# Patient Record
Sex: FEMALE | Race: WHITE | NOT HISPANIC OR LATINO | Employment: OTHER | URBAN - METROPOLITAN AREA
[De-identification: names, ages, dates, MRNs, and addresses within clinical notes are randomized per-mention and may not be internally consistent; named-entity substitution may affect disease eponyms.]

---

## 2017-01-02 DIAGNOSIS — I50.32 CHRONIC DIASTOLIC CONGESTIVE HEART FAILURE (H): Primary | ICD-10-CM

## 2017-01-02 NOTE — TELEPHONE ENCOUNTER
lasix      Last Written Prescription Date: 10/05/16  Last Fill Quantity: 90, # refills: 0  Last Office Visit with [facility], [facility] or [facility] prescribing provider: 10/05/16       POTASSIUM   Date Value Ref Range Status   10/05/2016 4.3 3.4 - 5.3 mmol/L Final     CREATININE   Date Value Ref Range Status   10/05/2016 1.32* 0.52 - 1.04 mg/dL Final     BP Readings from Last 3 Encounters:   10/05/16 120/60   04/18/16 121/57   02/24/16 108/60

## 2017-01-04 RX ORDER — FUROSEMIDE 20 MG
TABLET ORAL
Qty: 90 TABLET | Refills: 1 | Status: SHIPPED | OUTPATIENT
Start: 2017-01-04 | End: 2017-06-29

## 2017-01-04 NOTE — TELEPHONE ENCOUNTER
Routing refill request to provider for review/approval because:  Labs out of range:  Forrest Brumfield, RN, BSN

## 2017-03-13 ENCOUNTER — TELEPHONE (OUTPATIENT)
Dept: FAMILY MEDICINE | Facility: OTHER | Age: 77
End: 2017-03-13

## 2017-03-13 NOTE — PROGRESS NOTES
"  SUBJECTIVE:                                                    Pebbles Powell is a 76 year old female who presents to clinic today for the following health issues:    HPI    Dizziness     Onset: \"quite a while, 1 month or so\"    Description:   Do you feel faint:  YES  Does it feel like the surroundings (bed, room) are moving: no   Unsteady/off balance: no   Have you passed out or fallen: no     Progression of Symptoms:  Only happens occassionally     Accompanying Signs & Symptoms:  Heart palpitations: no   Nausea, vomiting: no   Weakness in arms or legs: no   Fatigue: YES  Vision or speech changes: no   Ringing in ears (Tinnitus): no   Hearing Loss: no    History:   Head trauma/concussion hx: no   Previous similar symptoms: unsure  Recent bleeding history: no     Precipitating factors:   Worse with activity or head movement: no   Any new medications (BP?): YES- iron and magnesium  Alcohol/drug abuse/withdrawal: no     Alleviating factors:   Does staying in a fixed position give relief:  YES       Therapies Tried and outcome: none    RESP:  She notes that she can't take a deep breath anymore, and that her lungs \"cant expand as much as they used to\".    Patient thinks it may be related to anemia. Please see triage note below from 03/13/17.:  Description: Dizziness with shortness of breath for about 1 month. Dizziness is once in a while, when standing up quickly or doing other tasks around the house. Gets shaky occasionally and has fallen due to shaking about a year ago. Known congestive heart failure and is anemic, taking iron pills 3 times a week. Intermittent shortness of breath at rest and with activity, feels like she is unable to get a deep breath in. Difficulty taking a deep breath on occasion. 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 Lungs sound clear with auscultation. Both ears have a mild to moderate amount of ear wax present. Denies weakness, fever, SOB interfering with " activities, wheezing, chest pain, worsening SOB, cough. Taking her bra off, improves breathing. Patient is requesting a chest x-ray at upcoming appointment for reassurance.   Onset/duration: 1 month  Pain scale (0-10) 0/10  LMP/preg/breast feeding: No LMP recorded. Patient has had a hysterectomy.  Last exam/Treatment: 10/05/2016  Allergies:        Allergies   Allergen Reactions     No Known Drug Allergies                Problem list and histories reviewed & adjusted, as indicated.  Additional history: as documented    Patient Active Problem List   Diagnosis     CHF (Congestive Heart Failure), diastolic dysfunction     CKD (chronic kidney disease) stage 3, GFR 30-59 ml/min     CARDIOVASCULAR SCREENING; LDL GOAL LESS THAN 130     Pernicious anemia     Iron deficiency anemia     BCC (basal cell carcinoma), right lower leg     Vulvar itching     Mammogram declined     Moderate major depression (H)     Advance care planning     Total knee replacement status     Hypothyroidism     Past Surgical History   Procedure Laterality Date     Hysterectomy, terence       fibroids     Arthroscopy knee  11/23/2010     ARTHROSCOPY KNEE performed by BROOKLYNN PULIDO at  OR     Colonoscopy  6/1/2011     Procedure:COMBINED COLONOSCOPY, SINGLE BIOPSY/POLYPECTOMY BY BIOPSY; Surgeon:JEANNETTE WYNNE; Location: GI     Esophagoscopy, gastroscopy, duodenoscopy (egd), combined  6/1/2011     Procedure:COMBINED ESOPHAGOSCOPY, GASTROSCOPY, DUODENOSCOPY (EGD), BIOPSY SINGLE OR MULTIPLE; MULTIPLE; Surgeon:JEANNETTE WYNNE; Location: GI     Colporrhaphy anterior       Appendectomy       Ent surgery       T&A     Dilation and curettage       C anter vesicourethropexy,simple       Hemorrhoidectomy       Laparotomy, tumor debulking, combined  2/9/2012     Procedure:COMBINED LAPAROTOMY, TUMOR DEBULKING; Exploratory Laparotomy,  Bilateral Salpingo Oophorectomy; Surgeon:MIKIE LU; Location:[facility] OR     Excise lesion lower extremity   "7/18/2014     Procedure: EXCISE LESION LOWER EXTREMITY;  Surgeon: Santos Roberson MD;  Location: [facility] OR     Arthroplasty knee Right 7/13/2015     Procedure: ARTHROPLASTY KNEE;  Surgeon: Santino Storm MD;  Location: [facility] OR       Social History   Substance Use Topics     Smoking status: Former Smoker     Packs/day: 1.50     Years: 50.00     Types: Cigarettes     Quit date: 8/20/2007     Smokeless tobacco: Never Used     Alcohol use No      Comment: Alcoholic, treatment in 1980 and 1988     Family History   Problem Relation Age of Onset     Alcohol/Drug Mother      Obesity Mother      Alzheimer Disease Mother      CANCER Father      Pancreatic     Cardiovascular Father      heart disease     Alcohol/Drug Father            ROS:  Constitutional, HEENT, cardiovascular, pulmonary, GI, , musculoskeletal, neuro, skin, endocrine and psych systems are negative, except as in HPI or otherwise noted     This document serves as a record of the services and decisions personally performed and made by Janiya Fraser MD. It was created on her behalf by Ilia Cronin , a trained medical scribe. The creation of this document is based the provider's statements to the medical scribe.  Ilia Cronin, March 15, 2017 1:11 PM     OBJECTIVE:                                                    Pulse 76  Temp 97.5  F (36.4  C) (Temporal)  Resp 16  Ht 5' 9.75\" (1.772 m)  Wt 187 lb (84.8 kg)  SpO2 98%  BMI 27.02 kg/m2  Body mass index is 27.02 kg/(m^2).     Orthostatic Vitals   BP Pulse Position Site Cuff Size Time Date   110/68 83 standing right regular 01:43 PM 3/15/2017   127/71 71 sitting right regular 01:43 PM 3/15/2017   113/66 67 laying right regular 01:43 PM 3/15/2017     No repeat blood pressure data filed.  No peak flow data filed.  No pain information filed.    GENERAL: healthy, alert, well nourished, well hydrated, no distress  HENT: dry mucous membranes; Sinuses: facial and maxillary not tender to palpation. ear canals- " normal; TMs- normal; Nose:- Congestion bilaterally; Mouth-post nasal drip, no ulcers, no lesions  NECK: no tenderness, moderate anterior cervical adenopathy, no asymmetry, no masses, no stiffness; thyroid- normal to palpation  RESP: lungs clear to auscultation - no rales, no rhonchi, no wheezes  CV: regular rates and rhythm, normal S1 S2, no S3 or S4 and no murmur, no click or rub, no lower extremity edema. -  NEURO: strength and tone- normal, sensory exam- grossly normal, mentation- intact, speech- normal, reflexes- symmetric  PSYCH: Negative.  [place]: negative bilaterally    Results for orders placed or performed in visit on 03/15/17 (from the past 24 hour(s))   CBC with platelets   Result Value Ref Range    WBC 7.5 4.0 - 11.0 10e9/L    RBC Count 3.47 (L) 3.8 - 5.2 10e12/L    Hemoglobin 10.4 (L) 11.7 - 15.7 g/dL    Hematocrit 31.8 (L) 35.0 - 47.0 %    MCV 92 78 - 100 fl    MCH 30.0 26.5 - 33.0 pg    MCHC 32.7 31.5 - 36.5 g/dL    RDW 13.0 10.0 - 15.0 %    Platelet Count 284 150 - 450 10e9/L   Spirometry, Breathing Capacity: Normal Order, Clinic Performed   Result Value Ref Range    FEV-1      FVC      FEV1/FVC      FEF 25/75          ASSESSMENT/PLAN:                                                        ICD-10-CM    1. Dizziness R42 BASIC METABOLIC PANEL   2. SOB (shortness of breath) R06.02 CBC with platelets     XR Chest 2 Views     Spirometry, Breathing Capacity: Normal Order, Clinic Performed   3. CKD (chronic kidney disease) stage 3, GFR 30-59 ml/min N18.3    4. Mild dehydration E86.0      Patient had normal spirometry, CXR, exam and labs.  Reassured taht her feeling of not breathing as deep is just a feeling.  She admits to being estranged recently from her daughter which is her only close relative and did not want to think that it affected her so, but feels that this is likely her symptoms and is reassured.  Wants no counseling at this time.  As for dizziness, found to be orthostatic and mildly  dehydrated -- recommended increased hydration.  She expresses understanding.         Patient Instructions   Dizziness: Make sure you are well hydrated to keep your blood pressure normal. Fatigue can also be a cause for your dizziness and shortness of breath. I recommenced rest and avoiding activities that may cause fatigue.  -Your lymph nodes in your neck are swollen most likely due to a viral infection. Follow up with the clinic if they worsen or don't resolve in a month.      The information in this document, created by the medical scribe for me, accurately reflects the services I personally performed and the decisions made by me. I have reviewed and approved this document for accuracy.   MD Janiya Moore MD, MD  [facility]

## 2017-03-13 NOTE — TELEPHONE ENCOUNTER
Pebbles Powell is a 76 year old female who presents with intermittent dizziness and shortness of breath.    NURSING ASSESSMENT:  Description:  Dizziness with shortness of breath for about 1 month. Dizziness is once in a while, when standing up quickly or doing other tasks around the house. Gets shaky occasionally and has fallen due to shaking about a year ago. Known congestive heart failure and is anemic, taking iron pills 3 times a week. Intermittent shortness of breath at rest and with activity, feels like she is unable to get a deep breath in. Difficulty taking a deep breath on occasion. 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 Lungs sound clear with auscultation. Both ears have a mild to moderate amount of ear wax present. Denies weakness, fever, SOB interfering with activities, wheezing, chest pain, worsening SOB, cough. Taking her bra off, improves breathing. Patient is requesting a chest x-ray at upcoming appointment for reassurance.   Onset/duration:  1 month  Pain scale (0-10)   0/10  LMP/preg/breast feeding:  No LMP recorded. Patient has had a hysterectomy.  Last exam/Treatment:  10/05/2016  Allergies:   Allergies   Allergen Reactions     No Known Drug Allergies      NURSING PLAN: Nursing advice to patient to keep scheduled appt    RECOMMENDED DISPOSITION:  Home care advice - to keep scheduled appointment and to monitor   Will comply with recommendation: Yes  If further questions/concerns or if symptoms do not improve, worsen or new symptoms develop, call your PCP or [facility] as soon as possible.    NOTES:  Disposition was determined by the first positive assessment question, therefore all previous assessment questions were negative    Guideline used:  Telephone Triage Protocols for Nurses, Fourth Edition, Rosa Carias  Difficult breathing  Dizziness  Nursing Judgment    Daniella Shaver RN

## 2017-03-15 ENCOUNTER — OFFICE VISIT (OUTPATIENT)
Dept: FAMILY MEDICINE | Facility: OTHER | Age: 77
End: 2017-03-15
Payer: COMMERCIAL

## 2017-03-15 ENCOUNTER — RADIANT APPOINTMENT (OUTPATIENT)
Dept: GENERAL RADIOLOGY | Facility: OTHER | Age: 77
End: 2017-03-15
Attending: FAMILY MEDICINE
Payer: COMMERCIAL

## 2017-03-15 VITALS
TEMPERATURE: 97.5 F | OXYGEN SATURATION: 98 % | HEART RATE: 76 BPM | BODY MASS INDEX: 26.77 KG/M2 | RESPIRATION RATE: 16 BRPM | HEIGHT: 70 IN | WEIGHT: 187 LBS

## 2017-03-15 DIAGNOSIS — R06.02 SOB (SHORTNESS OF BREATH): ICD-10-CM

## 2017-03-15 DIAGNOSIS — N18.30 CKD (CHRONIC KIDNEY DISEASE) STAGE 3, GFR 30-59 ML/MIN (H): ICD-10-CM

## 2017-03-15 DIAGNOSIS — R42 DIZZINESS: Primary | ICD-10-CM

## 2017-03-15 DIAGNOSIS — E86.0 MILD DEHYDRATION: ICD-10-CM

## 2017-03-15 LAB
ERYTHROCYTE [DISTWIDTH] IN BLOOD BY AUTOMATED COUNT: 13 % (ref 10–15)
FEF 25/75: NORMAL
FEV-1: NORMAL
FEV1/FVC: NORMAL
FVC: NORMAL
HCT VFR BLD AUTO: 31.8 % (ref 35–47)
HGB BLD-MCNC: 10.4 G/DL (ref 11.7–15.7)
MCH RBC QN AUTO: 30 PG (ref 26.5–33)
MCHC RBC AUTO-ENTMCNC: 32.7 G/DL (ref 31.5–36.5)
MCV RBC AUTO: 92 FL (ref 78–100)
PLATELET # BLD AUTO: 284 10E9/L (ref 150–450)
RBC # BLD AUTO: 3.47 10E12/L (ref 3.8–5.2)
WBC # BLD AUTO: 7.5 10E9/L (ref 4–11)

## 2017-03-15 PROCEDURE — 94010 BREATHING CAPACITY TEST: CPT | Performed by: FAMILY MEDICINE

## 2017-03-15 PROCEDURE — 80048 BASIC METABOLIC PNL TOTAL CA: CPT | Performed by: FAMILY MEDICINE

## 2017-03-15 PROCEDURE — 36415 COLL VENOUS BLD VENIPUNCTURE: CPT | Performed by: FAMILY MEDICINE

## 2017-03-15 PROCEDURE — 71020 XR CHEST 2 VW: CPT

## 2017-03-15 PROCEDURE — 99214 OFFICE O/P EST MOD 30 MIN: CPT | Mod: 25 | Performed by: FAMILY MEDICINE

## 2017-03-15 PROCEDURE — 85027 COMPLETE CBC AUTOMATED: CPT | Performed by: FAMILY MEDICINE

## 2017-03-15 ASSESSMENT — PAIN SCALES - GENERAL: PAINLEVEL: NO PAIN (0)

## 2017-03-15 NOTE — NURSING NOTE
"Chief Complaint   Patient presents with     Anemia     Dizziness     Shortness of Breath     Panel Management     Fall Risk, DAP, PHQ9, BMP, Microalbumin       Initial Pulse 76  Temp 97.5  F (36.4  C) (Temporal)  Resp 16  Ht 5' 9.75\" (1.772 m)  Wt 187 lb (84.8 kg)  SpO2 98%  BMI 27.02 kg/m2 Estimated body mass index is 27.02 kg/(m^2) as calculated from the following:    Height as of this encounter: 5' 9.75\" (1.772 m).    Weight as of this encounter: 187 lb (84.8 kg).  Medication Reconciliation: complete     Codie Larios, AKHIL      "

## 2017-03-15 NOTE — PATIENT INSTRUCTIONS
Dizziness: Make sure you are well hydrated to keep your blood pressure normal. Fatigue can also be a cause for your dizziness and shortness of breath. I recommenced rest and avoiding activities that may cause fatigue.  -Your lymph nodes in your neck are swollen most likely due to a viral infection. Follow up with the clinic if they worsen or don't resolve in a month.

## 2017-03-15 NOTE — LETTER
My Depression Action Plan  Name: Pebbles Powell   Date of Birth 1940  Date: 3/13/2017    My doctor: Janiya Fraser   My clinic: [address]  [facility] [address] [phone]          GREEN    ZONE   Good Control    What it looks like:     Things are going generally well. You have normal up s and down s. You may even feel depressed from time to time, but bad moods usually last less than a day.   What you need to do:  1. Continue to care for yourself (see self care plan)  2. Check your depression survival kit and update it as needed  3. Follow your physician s recommendations including any medication.  4. Do not stop taking medication unless you consult with your physician first.           YELLOW         ZONE Getting Worse    What it looks like:     Depression is starting to interfere with your life.     It may be hard to get out of bed; you may be starting to isolate yourself from others.    Symptoms of depression are starting to last most all day and this has happened for several days.     You may have suicidal thoughts but they are not constant.   What you need to do:     1. Call your care team, your response to treatment will improve if you keep your care team informed of your progress. Yellow periods are signs an adjustment may need to be made.     2. Continue your self-care, even if you have to fake it!    3. Talk to someone in your support network    4. Open up your depression survival kit           RED    ZONE Medical Alert - Get Help    What it looks like:     Depression is seriously interfering with your life.     You may experience these or other symptoms: You can t get out of bed most days, can t work or engage in other necessary activities, you have trouble taking care of basic hygiene, or basic responsibilities, thoughts of suicide or death that will not go away, self-injurious behavior.     What you need to do:  1. Call your care team  and request a same-day appointment. If they are not available (weekends or after hours) call your local crisis line, emergency room or 911.      Electronically signed by: Harriet Slaughter, March 13, 2017    Depression Self Care Plan / Survival Kit    Self-Care for Depression  Here s the deal. Your body and mind are really not as separate as most people think.  What you do and think affects how you feel and how you feel influences what you do and think. This means if you do things that people who feel good do, it will help you feel better.  Sometimes this is all it takes.  There is also a place for medication and therapy depending on how severe your depression is, so be sure to consult with your medical provider and/ or Behavioral Health Consultant if your symptoms are worsening or not improving.     In order to better manage my stress, I will:    Exercise  Get some form of exercise, every day. This will help reduce pain and release endorphins, the  feel good  chemicals in your brain. This is almost as good as taking antidepressants!  This is not the same as joining a gym and then never going! (they count on that by the way ) It can be as simple as just going for a walk or doing some gardening, anything that will get you moving.      Hygiene   Maintain good hygiene (Get out of bed in the morning, Make your bed, Brush your teeth, Take a shower, and Get dressed like you were going to work, even if you are unemployed).  If your clothes don't fit try to get ones that do.    Diet  I will strive to eat foods that are good for me, drink plenty of water, and avoid excessive sugar, caffeine, alcohol, and other mood-altering substances.  Some foods that are helpful in depression are: complex carbohydrates, B vitamins, flaxseed, fish or fish oil, fresh fruits and vegetables.    Psychotherapy  I agree to participate in Individual Therapy (if recommended).    Medication  If prescribed medications, I agree to take them.  Missing  doses can result in serious side effects.  I understand that drinking alcohol, or other illicit drug use, may cause potential side effects.  I will not stop my medication abruptly without first discussing it with my provider.    Staying Connected With Others  I will stay in touch with my friends, family members, and my primary care provider/team.    Use your imagination  Be creative.  We all have a creative side; it doesn t matter if it s oil painting, sand castles, or mud pies! This will also kick up the endorphins.    Witness Beauty  (AKA stop and smell the roses) Take a look outside, even in mid-winter. Notice colors, textures. Watch the squirrels and birds.     Service to others  Be of service to others.  There is always someone else in need.  By helping others we can  get out of ourselves  and remember the really important things.  This also provides opportunities for practicing all the other parts of the program.    Humor  Laugh and be silly!  Adjust your TV habits for less news and crime-drama and more comedy.    Control your stress  Try breathing deep, massage therapy, biofeedback, and meditation. Find time to relax each day.     My support system    Clinic Contact:  Phone number:    Contact 1:  Phone number:    Contact 2:  Phone number:    Buddhism/:  Phone number:    Therapist:  Phone number:    Local crisis center:    Phone number:    Other community support:  Phone number:

## 2017-03-15 NOTE — MR AVS SNAPSHOT
After Visit Summary   3/15/2017    Pebbles Powell    MRN: [id number]           Patient Information     Date Of Birth          1940        Visit Information        Provider Department      3/15/2017 1:15 PM Janiya Fraser MD [facility]        Today's Diagnoses     Dizziness    -  1    SOB (shortness of breath)        CKD (chronic kidney disease) stage 3, GFR 30-59 ml/min        Mild dehydration          Care Instructions    Dizziness: Make sure you are well hydrated to keep your blood pressure normal. Fatigue can also be a cause for your dizziness and shortness of breath. I recommenced rest and avoiding activities that may cause fatigue.  -Your lymph nodes in your neck are swollen most likely due to a viral infection. Follow up with the clinic if they worsen or don't resolve in a month.        Follow-ups after your visit        Who to contact     If you have questions or need follow up information about today's clinic visit or your schedule please contact [facility] directly at [phone].  Normal or non-critical lab and imaging results will be communicated to you by [employer], letter or phone within 4 business days after the clinic has received the results. If you do not hear from us within 7 days, please contact the clinic through [employer] or phone. If you have a critical or abnormal lab result, we will notify you by phone as soon as possible.  Submit refill requests through [employer] or call your pharmacy and they will forward the refill request to us. Please allow 3 business days for your refill to be completed.          Additional Information About Your Visit        MyChart Information     [employer] gives you secure access to your electronic health record. If you see a primary care provider, you can also send messages to your care team and make appointments. If you have questions, please call your primary care clinic.  If you do not have a primary care provider,  "please call [phone] and they will assist you.        Care EveryWhere ID     This is your Care EveryWhere ID. This could be used by other organizations to access your [place] medical records  [id number]        Your Vitals Were     Pulse Temperature Respirations Height Pulse Oximetry BMI (Body Mass Index)    76 97.5  F (36.4  C) (Temporal) 16 5' 9.75\" (1.772 m) 98% 27.02 kg/m2       Blood Pressure from Last 3 Encounters:   10/05/16 120/60   04/18/16 121/57   02/24/16 108/60    Weight from Last 3 Encounters:   03/15/17 187 lb (84.8 kg)   10/05/16 187 lb (84.8 kg)   04/18/16 193 lb (87.5 kg)              We Performed the Following     BASIC METABOLIC PANEL     CBC with platelets     DEPRESSION ACTION PLAN (DAP) Order [[id number]]     Spirometry, Breathing Capacity: Normal Order, Clinic Performed        Primary Care Provider Office Phone # Fax #    Janiya Yady Fraser [phone]       [facility]  [address] [facility] [address]        Thank you!     Thank you for choosing [facility]  for your care. Our goal is always to provide you with excellent care. Hearing back from our patients is one way we can continue to improve our services. Please take a few minutes to complete the written survey that you may receive in the mail after your visit with us. Thank you!             Your Updated Medication List - Protect others around you: Learn how to safely use, store and throw away your medicines at www.disposemymeds.org.          This list is accurate as of: 3/15/17  4:27 PM.  Always use your most recent med list.                   Brand Name Dispense Instructions for use    acetaminophen 650 MG 8 hour tablet     100 tablet    Take 650 mg by mouth every 6 hours       FERROUS GLUCONATE PO      Take 324 mg by mouth every other day       furosemide 20 MG tablet    LASIX    90 tablet    TAKE ONE TABLET BY MOUTH ONCE DAILY IN THE MORNING       hydrocortisone 1 % ointment     30 g "    Apply sparingly to affected area daily as needed.  Small pea size amount.       levothyroxine 100 MCG tablet    SYNTHROID/LEVOTHROID    90 tablet    Take 1 tablet (100 mcg) by mouth daily       multivitamin per tablet      1 TABLET DAILY brand varies per pt       * order for DME     1 Device    Equipment being ordered: compression stockings, med strength       * order for DME     1 Device    Equipment being ordered: high rise chair for toilet seat       VITAMIN B 12 PO      Take 2,500 mcg by mouth       * Notice:  This list has 2 medication(s) that are the same as other medications prescribed for you. Read the directions carefully, and ask your doctor or other care provider to review them with you.

## 2017-03-16 LAB
ANION GAP SERPL CALCULATED.3IONS-SCNC: 8 MMOL/L (ref 3–14)
BUN SERPL-MCNC: 27 MG/DL (ref 7–30)
CALCIUM SERPL-MCNC: 8.9 MG/DL (ref 8.5–10.1)
CHLORIDE SERPL-SCNC: 107 MMOL/L (ref 94–109)
CO2 SERPL-SCNC: 27 MMOL/L (ref 20–32)
CREAT SERPL-MCNC: 1.5 MG/DL (ref 0.52–1.04)
GFR SERPL CREATININE-BSD FRML MDRD: 34 ML/MIN/1.7M2
GLUCOSE SERPL-MCNC: 88 MG/DL (ref 70–99)
POTASSIUM SERPL-SCNC: 4.5 MMOL/L (ref 3.4–5.3)
SODIUM SERPL-SCNC: 142 MMOL/L (ref 133–144)

## 2017-03-16 NOTE — PROGRESS NOTES
Pebbles, your kidney results were fairly stable.  Please let me know if you have any questions.    Janiya Fraser MD

## 2017-05-18 ENCOUNTER — TELEPHONE (OUTPATIENT)
Dept: FAMILY MEDICINE | Facility: OTHER | Age: 77
End: 2017-05-18

## 2017-05-18 DIAGNOSIS — E78.5 HYPERLIPEMIA: Primary | ICD-10-CM

## 2017-05-18 DIAGNOSIS — E03.9 HYPOTHYROIDISM: ICD-10-CM

## 2017-05-18 NOTE — TELEPHONE ENCOUNTER
Levothyroxine 100 mcg     Last Written Prescription Date: 5/24/2016  Last Quantity: 90, # refills: 3  Last Office Visit with G, P or [facility] prescribing provider: 3/15/2017          TSH   Date Value Ref Range Status   05/06/2016 1.58 0.40 - 4.00 mU/L Final

## 2017-05-19 RX ORDER — LEVOTHYROXINE SODIUM 100 UG/1
TABLET ORAL
Qty: 90 TABLET | Refills: 0 | Status: SHIPPED | OUTPATIENT
Start: 2017-05-19 | End: 2017-08-15

## 2017-05-19 NOTE — TELEPHONE ENCOUNTER
Medication is being filled for 1 time refill only due to:  Patient needs labs TSH. Please call and help schedule.    Per HM due for fasting lipids and PHQ9  Please update also  Aleksandr Estevez, RN, BSN

## 2017-05-19 NOTE — TELEPHONE ENCOUNTER
Rx was sent 05/1/2017 for 90 tabs and 0 refills.     Please assist in scheduling Lab appt for TSH.    Daniella Shaver RN, BSN

## 2017-05-22 DIAGNOSIS — E78.5 HYPERLIPEMIA: ICD-10-CM

## 2017-05-22 DIAGNOSIS — E03.9 HYPOTHYROIDISM: ICD-10-CM

## 2017-05-22 LAB
CHOLEST SERPL-MCNC: 136 MG/DL
HDLC SERPL-MCNC: 37 MG/DL
LDLC SERPL CALC-MCNC: 84 MG/DL
NONHDLC SERPL-MCNC: 99 MG/DL
TRIGL SERPL-MCNC: 73 MG/DL
TSH SERPL DL<=0.005 MIU/L-ACNC: 1.7 MU/L (ref 0.4–4)

## 2017-05-22 PROCEDURE — 80061 LIPID PANEL: CPT | Performed by: FAMILY MEDICINE

## 2017-05-22 PROCEDURE — 36415 COLL VENOUS BLD VENIPUNCTURE: CPT | Performed by: FAMILY MEDICINE

## 2017-05-22 PROCEDURE — 84443 ASSAY THYROID STIM HORMONE: CPT | Performed by: FAMILY MEDICINE

## 2017-05-22 NOTE — PROGRESS NOTES
Pebbles, your results were all normal.    Please let me know if you have any questions.    Janiya Fraser MD

## 2017-06-29 DIAGNOSIS — I50.32 CHRONIC DIASTOLIC CONGESTIVE HEART FAILURE (H): ICD-10-CM

## 2017-06-29 NOTE — TELEPHONE ENCOUNTER
furosemide (LASIX) 20 MG tablet      Last Written Prescription Date: 1/4/17  Last Fill Quantity: 90, # refills: 1  Last Office Visit with G, P or [facility] prescribing provider: 3/15/17       Potassium   Date Value Ref Range Status   03/15/2017 4.5 3.4 - 5.3 mmol/L Final     Creatinine   Date Value Ref Range Status   03/15/2017 1.50 (H) 0.52 - 1.04 mg/dL Final     BP Readings from Last 3 Encounters:   10/05/16 120/60   04/18/16 121/57   02/24/16 108/60

## 2017-07-03 RX ORDER — FUROSEMIDE 20 MG
TABLET ORAL
Qty: 90 TABLET | Refills: 1 | Status: SHIPPED | OUTPATIENT
Start: 2017-07-03 | End: 2018-01-03

## 2017-07-03 NOTE — TELEPHONE ENCOUNTER
furosemide (LASIX) 20 MG tablet  Routing refill request to provider for review/approval because:  Labs out of range:  Creatinine     Daniella Shaver RN, BSN

## 2017-08-08 ENCOUNTER — OFFICE VISIT (OUTPATIENT)
Dept: FAMILY MEDICINE | Facility: OTHER | Age: 77
End: 2017-08-08
Payer: COMMERCIAL

## 2017-08-08 VITALS
DIASTOLIC BLOOD PRESSURE: 62 MMHG | BODY MASS INDEX: 25.38 KG/M2 | HEART RATE: 76 BPM | RESPIRATION RATE: 16 BRPM | SYSTOLIC BLOOD PRESSURE: 116 MMHG | OXYGEN SATURATION: 94 % | WEIGHT: 175.6 LBS | TEMPERATURE: 98.6 F

## 2017-08-08 DIAGNOSIS — S91.351D: Primary | ICD-10-CM

## 2017-08-08 DIAGNOSIS — L02.619 CELLULITIS AND ABSCESS OF FOOT EXCLUDING TOE: ICD-10-CM
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
## 2017-08-08 DIAGNOSIS — L03.119 CELLULITIS AND ABSCESS OF FOOT EXCLUDING TOE: ICD-10-CM

## 2017-08-08 LAB
GRAM STN SPEC: NORMAL
Lab: NORMAL
MICRO REPORT STATUS: NORMAL
SPECIMEN SOURCE: NORMAL

## 2017-08-08 PROCEDURE — 87077 CULTURE AEROBIC IDENTIFY: CPT | Performed by: PHYSICIAN ASSISTANT

## 2017-08-08 PROCEDURE — 87205 SMEAR GRAM STAIN: CPT | Performed by: PHYSICIAN ASSISTANT

## 2017-08-08 PROCEDURE — 87181 SC STD AGAR DILUTION PER AGT: CPT | Performed by: PHYSICIAN ASSISTANT

## 2017-08-08 PROCEDURE — 87070 CULTURE OTHR SPECIMN AEROBIC: CPT | Performed by: PHYSICIAN ASSISTANT

## 2017-08-08 PROCEDURE — 10061 I&D ABSCESS COMP/MULTIPLE: CPT | Performed by: PHYSICIAN ASSISTANT

## 2017-08-08 RX ORDER — DOXYCYCLINE HYCLATE 100 MG
100 TABLET ORAL 2 TIMES DAILY
Qty: 20 TABLET | Refills: 0 | Status: SHIPPED | OUTPATIENT
Start: 2017-08-08 | End: 2017-12-07

## 2017-08-08 RX ORDER — FOLIC ACID 0.8 MG
250 TABLET ORAL DAILY
COMMUNITY
End: 2023-01-23

## 2017-08-08 NOTE — MR AVS SNAPSHOT
After Visit Summary   8/8/2017    Pebbles Powell    MRN: [id number]           Patient Information     Date Of Birth          1940        Visit Information        Provider Department      8/8/2017 9:15 AM Taylor Jameson PA-C [facility]        Today's Diagnoses     Cat bite of right foot, subsequent encounter    -  1      Care Instructions    - Start Doxycycline twice a day for 10 days   - Continue with Amoxicillin-Clavulanate as prescribed  - Return to clinic every 3 days for wound repacking   - Do not get area wet   - Change outer bandage once a day                        Wound Closure and Wound Care  What is wound closure?   Wounds heal more quickly and with less risk of infection and scarring when the wound is cleaned and the wound edges are held together (closed). Scrapes, scratches, puncture wounds, and shallow cuts may need only cleaning, ointment, and a bandage. Some cuts may need to be closed with tape strips called Steri-Strips or tissue adhesive liquid (skin glue). If a cut or surgical incision is deep, very long, jagged, or under a lot of tension (such as a cut over a joint), stitches (also called sutures) or staples may be needed to close the wound.   How do I take care of my wound and sutures?   If you get an accidental cut, put pressure on the wound with a gauze pad or clean cloth right away to stop the bleeding. Then gently but thoroughly wash it with soap and cool water. Soapy water can be used around, but not in the cut. Try to remove all dirt and debris but do not scrub vigorously. If you decide to get medical treatment, cover the wound and apply pressure as needed to control bleeding while traveling to your healthcare provider's office, urgent care clinic, or emergency room.   After a wound is closed by your healthcare provider, the wound and the area around it must be kept clean and dry. The care of a stapled wound is similar to the care of a  "sutured wound. There are minor differences in caring for a wound closed with skin glue.   Do not let a wound closed with stitches or staples get wet for the first 24 hours. After 24 hours, you can shower or you can clean the wound with hydrogen peroxide or gently wash it with soap and warm water twice a day.   If your wound was closed with skin glue, keep the wound dry for the first 4 hours after the skin glue was put on. After the first 4 hours, you may occasionally and briefly wet the wound in the shower. You can clean the wound with hydrogen peroxide or gently wash it with soap and water twice a day.   If your wound is closed with Steri-Strips, they may be more likely to separate if they get wet. Keep them dry for the first few days while you're in the shower or bath.   Do not soak or scrub the wound. Do not take a bath, go swimming, or use a hot tub.   If recommended by your healthcare provider, you may put a small amount of antibiotic ointment on the wound each time you clean it. This can prevent infection. It will also help keep bandages from sticking to the wound. If a rash appears, stop using the ointment. If your wound is closed with skin glue, do not put liquid, antibiotic ointment, or any other product on the wound while the adhesive is in place. It may loosen the film before the wound is healed.   Make sure the wound is kept dry between washings. After showering or bathing, gently pat the wound dry with a soft towel.   Your healthcare provider may recommend that you cover the wound with gauze or a new, bandage to keep it from getting dirty. Be sure to keep the bandage dry. Put on a new bandage after cleaning the wound of if the old one gets dirty or wet.   Your healthcare provider may recommend leaving the wound \"open to air\" and not covered by a bandage while you sleep to help speed up the healing process. If the wound was closed with skin glue, you do not need a bandage.   For the first 1 or 2 days " keep the area propped up higher than your heart. This will help lessen your pain and any swelling.   Protect the wound from repeat injury until the skin has had time to heal.   Protect the wound from a lot of exposure to sunlight or tanning lamps while skin glue is in place. Wounds exposed to the sun can become red, while scars that have not been exposed to the sun usually turn white after a period of time.   Do not scratch, rub, or pick at your stitches, staples, or skin glue. This may cause them to loosen before the wound is healed.   Avoid activities that will make you sweat a lot until the skin glue has naturally fallen off or the stitches or staples have been removed.   Any wound can become infected. When you are cleaning your wound, look for these signs of infection:   increased redness   red streaks   increased swelling   increased pain or tenderness   pus or other drainage   warmth in the area of the wound   fever.   Contact your provider if you see any signs of infection.   If your wound was accidental, be sure to ask if a tetanus booster is needed. Treatment of accidental wounds may include taking an oral antibiotic to help prevent infection. Be sure to take the medicine until it is completely gone. Do not stop taking it just because the wound looks like it is healing well.   When are stitches, staples, or other types of wound closures removed?   Steri-Strips are usually left on until they fall off. If they have not fallen off after 2 weeks, they should be removed. Skin glue usually falls off on its own in 5 to 10 days.   For deep cuts the first stitches are placed under the skin. These stitches are made of materials that dissolve and do not need to be removed. Sutures or staples on the surface of the skin need to be removed by your healthcare provider 5 to 14 days after they are put in. The length of time depends on where the cut is. Sutures in wounds on the face usually can be removed after 5 to 7 days.  In areas of high stress, such as hands, knees, or elbows, the sutures must stay in 10 to 14 days. Your provider will tell you when you should come to the office for removal of your sutures or staples. Do NOT remove sutures or staples yourself unless your provider instructs you to do so. Staples are removed using a special tool. If you don't have the tool, don't try to remove the staples.   When should I call my healthcare provider?   Some swelling, redness, and pain are common with all wounds and normally go away as the wound heals.   Call your provider right away if:   You start to have any signs or symptoms of infection. These include:   Your skin is redder or more painful.   You have red streaks from the wound going toward your heart.   The wound area is very warm to touch.   You have pus or other fluid coming from the wound area.   You have a fever higher than 101.5? F (38.6? C).   You have chills, nausea, vomiting, or muscle aches.   The wound seems to be opening up or you notice any drainage.   The wound bleeds for more than 10 minutes.   The stitches or staples are loose.   The skin glue is loosening before it is supposed to.   You have any symptoms that worry you.     Published by [employer].  This content is reviewed periodically and is subject to change as new health information becomes available. The information is intended to inform and educate and is not a replacement for medical evaluation, advice, diagnosis or treatment by a healthcare professional.   Written by Dwight Hernandez MD.   ? 2010 [employer] and/or its affiliates. All Rights Reserved.   Copyright   [employer] 2011                Follow-ups after your visit        Your next 10 appointments already scheduled     Aug 11, 2017  9:30 AM CDT   Nurse [facility] with NL RN TEAM [facility], [facility]    [address] [address]   [phone]            Aug 14, 2017 10:00  AM CDT   Nurse Only with NL RN TEAM A, DAYRON   [facility]    [address]  [facility] [address] [phone]            Aug 18, 2017 10:40 AM CDT   Office Visit with Carolyn Domingo MD   [facility]    [address] [place] [address]  [address]  [facility] [id number] [phone]           Bring a current list of meds and any records pertaining to this visit. For Physicals, please bring immunization records and any forms needing to be filled out. Please arrive 10 minutes early to complete paperwork.              Who to contact     If you have questions or need follow up information about today's clinic visit or your schedule please contact [facility] directly at [phone].  Normal or non-critical lab and imaging results will be communicated to you by MyChart, letter or phone within 4 business days after the clinic has received the results. If you do not hear from us within 7 days, please contact the clinic through [email or web address] or phone. If you have a critical or abnormal lab result, we will notify you by phone as soon as possible.  Submit refill requests through [employer] or call your pharmacy and they will forward the refill request to us. Please allow 3 business days for your refill to be completed.          Additional Information About Your Visit        MyChart Information     [employer] gives you secure access to your electronic health record. If you see a primary care provider, you can also send messages to your care team and make appointments. If you have questions, please call your primary care clinic.  If you do not have a primary care provider, please call [phone] and they will assist you.        Care EveryWhere ID     This is your Care EveryWhere ID. This could be used by other organizations to access your [facility] medical records  [id number]        Your Vitals  Were     Pulse Temperature Respirations Pulse Oximetry BMI (Body Mass Index)       76 98.6  F (37  C) (Oral) 16 94% 25.38 kg/m2        Blood Pressure from Last 3 Encounters:   08/08/17 116/62   10/05/16 120/60   04/18/16 121/57    Weight from Last 3 Encounters:   08/08/17 175 lb 9.6 oz (79.7 kg)   03/15/17 187 lb (84.8 kg)   10/05/16 187 lb (84.8 kg)              We Performed the Following     Gram stain     Wound Culture Aerobic Bacterial          Today's Medication Changes          These changes are accurate as of: 8/8/17  9:31 AM.  If you have any questions, ask your nurse or doctor.               Start taking these medicines.        Dose/Directions    doxycycline 100 MG tablet   Commonly known as:  VIBRA-TABS   Used for:  Cat bite of right foot, subsequent encounter   Started by:  Taylor Jameson PA-C        Dose:  100 mg   Take 1 tablet (100 mg) by mouth 2 times daily   Quantity:  20 tablet   Refills:  0            Where to get your medicines      These medications were sent to [facility] [address] [place] [address] [address] [address]     Phone:  [phone]     doxycycline 100 MG tablet                Primary Care Provider Office Phone # Fax #    Janiya Fraser [phone]       [facility]  [address] [facility] [address]        Equal Access to Services     MICHAEL LARES AH: Hadii aad ku hadasho [place]i, waaxda luqadaha, qaybta kaalmada adeegyada, waxay denisa herr. So [facility] [phone].    ATENCIÓN: Si habla español, tiene a caal disposición servicios gratuitos de asistencia lingüística. Festus al [phone].    We comply with applicable federal civil rights laws and [place] laws. We do not discriminate on the basis of race, color, national origin, age, disability sex, sexual orientation or gender identity.            Thank you!     Thank you for choosing [facility]  for your  care. Our goal is always to provide you with excellent care. Hearing back from our patients is one way we can continue to improve our services. Please take a few minutes to complete the written survey that you may receive in the mail after your visit with us. Thank you!             Your Updated Medication List - Protect others around you: Learn how to safely use, store and throw away your medicines at www.disposemymeds.org.          This list is accurate as of: 8/8/17  9:31 AM.  Always use your most recent med list.                   Brand Name Dispense Instructions for use Diagnosis    acetaminophen 650 MG 8 hour tablet     100 tablet    Take 650 mg by mouth every 6 hours    Status post total right knee replacement       AUGMENTIN PO           doxycycline 100 MG tablet    VIBRA-TABS    20 tablet    Take 1 tablet (100 mg) by mouth 2 times daily    Cat bite of right foot, subsequent encounter       FERROUS GLUCONATE PO      Take 324 mg by mouth every other day        furosemide 20 MG tablet    LASIX    90 tablet    TAKE ONE TABLET BY MOUTH ONCE DAILY IN THE MORNING    Chronic diastolic congestive heart failure (H)       hydrocortisone 1 % ointment     30 g    Apply sparingly to affected area daily as needed.  Small pea size amount.    Lichen sclerosus       levothyroxine 100 MCG tablet    SYNTHROID/LEVOTHROID    90 tablet    TAKE ONE TABLET BY MOUTH ONCE DAILY    Hypothyroidism       Magnesium 500 MG Caps           multivitamin per tablet      1 TABLET DAILY brand varies per pt        * order for DME     1 Device    Equipment being ordered: compression stockings, med strength    Unspecified venous (peripheral) insufficiency       * order for DME     1 Device    Equipment being ordered: high rise chair for toilet seat    Unspecified venous (peripheral) insufficiency, OA (osteoarthritis) of knee       VITAMIN B 12 PO      Take 2,500 mcg by mouth        * Notice:  This list has 2 medication(s) that are the same as other  medications prescribed for you. Read the directions carefully, and ask your doctor or other care provider to review them with you.

## 2017-08-08 NOTE — PATIENT INSTRUCTIONS
- Start Doxycycline twice a day for 10 days   - Continue with Amoxicillin-Clavulanate as prescribed  - Return to clinic every 3 days for wound repacking   - Do not get area wet   - Change outer bandage once a day                        Wound Closure and Wound Care  What is wound closure?   Wounds heal more quickly and with less risk of infection and scarring when the wound is cleaned and the wound edges are held together (closed). Scrapes, scratches, puncture wounds, and shallow cuts may need only cleaning, ointment, and a bandage. Some cuts may need to be closed with tape strips called Steri-Strips or tissue adhesive liquid (skin glue). If a cut or surgical incision is deep, very long, jagged, or under a lot of tension (such as a cut over a joint), stitches (also called sutures) or staples may be needed to close the wound.   How do I take care of my wound and sutures?   If you get an accidental cut, put pressure on the wound with a gauze pad or clean cloth right away to stop the bleeding. Then gently but thoroughly wash it with soap and cool water. Soapy water can be used around, but not in the cut. Try to remove all dirt and debris but do not scrub vigorously. If you decide to get medical treatment, cover the wound and apply pressure as needed to control bleeding while traveling to your healthcare provider's office, urgent care clinic, or emergency room.   After a wound is closed by your healthcare provider, the wound and the area around it must be kept clean and dry. The care of a stapled wound is similar to the care of a sutured wound. There are minor differences in caring for a wound closed with skin glue.   Do not let a wound closed with stitches or staples get wet for the first 24 hours. After 24 hours, you can shower or you can clean the wound with hydrogen peroxide or gently wash it with soap and warm water twice a day.   If your wound was closed with skin glue, keep the wound dry for the first 4 hours  "after the skin glue was put on. After the first 4 hours, you may occasionally and briefly wet the wound in the shower. You can clean the wound with hydrogen peroxide or gently wash it with soap and water twice a day.   If your wound is closed with Steri-Strips, they may be more likely to separate if they get wet. Keep them dry for the first few days while you're in the shower or bath.   Do not soak or scrub the wound. Do not take a bath, go swimming, or use a hot tub.   If recommended by your healthcare provider, you may put a small amount of antibiotic ointment on the wound each time you clean it. This can prevent infection. It will also help keep bandages from sticking to the wound. If a rash appears, stop using the ointment. If your wound is closed with skin glue, do not put liquid, antibiotic ointment, or any other product on the wound while the adhesive is in place. It may loosen the film before the wound is healed.   Make sure the wound is kept dry between washings. After showering or bathing, gently pat the wound dry with a soft towel.   Your healthcare provider may recommend that you cover the wound with gauze or a new, bandage to keep it from getting dirty. Be sure to keep the bandage dry. Put on a new bandage after cleaning the wound of if the old one gets dirty or wet.   Your healthcare provider may recommend leaving the wound \"open to air\" and not covered by a bandage while you sleep to help speed up the healing process. If the wound was closed with skin glue, you do not need a bandage.   For the first 1 or 2 days keep the area propped up higher than your heart. This will help lessen your pain and any swelling.   Protect the wound from repeat injury until the skin has had time to heal.   Protect the wound from a lot of exposure to sunlight or tanning lamps while skin glue is in place. Wounds exposed to the sun can become red, while scars that have not been exposed to the sun usually turn white after a " period of time.   Do not scratch, rub, or pick at your stitches, staples, or skin glue. This may cause them to loosen before the wound is healed.   Avoid activities that will make you sweat a lot until the skin glue has naturally fallen off or the stitches or staples have been removed.   Any wound can become infected. When you are cleaning your wound, look for these signs of infection:   increased redness   red streaks   increased swelling   increased pain or tenderness   pus or other drainage   warmth in the area of the wound   fever.   Contact your provider if you see any signs of infection.   If your wound was accidental, be sure to ask if a tetanus booster is needed. Treatment of accidental wounds may include taking an oral antibiotic to help prevent infection. Be sure to take the medicine until it is completely gone. Do not stop taking it just because the wound looks like it is healing well.   When are stitches, staples, or other types of wound closures removed?   Steri-Strips are usually left on until they fall off. If they have not fallen off after 2 weeks, they should be removed. Skin glue usually falls off on its own in 5 to 10 days.   For deep cuts the first stitches are placed under the skin. These stitches are made of materials that dissolve and do not need to be removed. Sutures or staples on the surface of the skin need to be removed by your healthcare provider 5 to 14 days after they are put in. The length of time depends on where the cut is. Sutures in wounds on the face usually can be removed after 5 to 7 days. In areas of high stress, such as hands, knees, or elbows, the sutures must stay in 10 to 14 days. Your provider will tell you when you should come to the office for removal of your sutures or staples. Do NOT remove sutures or staples yourself unless your provider instructs you to do so. Staples are removed using a special tool. If you don't have the tool, don't try to remove the staples.    When should I call my healthcare provider?   Some swelling, redness, and pain are common with all wounds and normally go away as the wound heals.   Call your provider right away if:   You start to have any signs or symptoms of infection. These include:   Your skin is redder or more painful.   You have red streaks from the wound going toward your heart.   The wound area is very warm to touch.   You have pus or other fluid coming from the wound area.   You have a fever higher than 101.5? F (38.6? C).   You have chills, nausea, vomiting, or muscle aches.   The wound seems to be opening up or you notice any drainage.   The wound bleeds for more than 10 minutes.   The stitches or staples are loose.   The skin glue is loosening before it is supposed to.   You have any symptoms that worry you.     Published by [employer].  This content is reviewed periodically and is subject to change as new health information becomes available. The information is intended to inform and educate and is not a replacement for medical evaluation, advice, diagnosis or treatment by a healthcare professional.   Written by Dwight Hernandez MD.   ? 2010 [employer] and/or its affiliates. All Rights Reserved.   Copyright   [employer] 2011

## 2017-08-08 NOTE — PROGRESS NOTES
SUBJECTIVE:                                                    Pebbles Powell is a 76 year old female who presents to clinic today for the following health issues:      HPI    Pt was seen at the urgent care ([facility]) Sunday for a cat bite, was sutured, but pt states it feels painful to touch, warm and is red, looks infected to her.  There are two bites on the right leg.  Pt is currently taking augmentin  - Her own cat, got from [facility] in [place], was abused, was lashing out due to having to be in carrier to go to t    - UTD on all the shots   - She pulled her leg away when it was still attached so became like a slash derik  - Was started on Augmentin BID for 10 days   - Also had 2 stitches put in top one and 1 in the bottom one     Problem list and histories reviewed & adjusted, as indicated.  Additional history: as documented    ROS:  Constitutional, HEENT, cardiovascular, pulmonary, gi and gu systems are negative, except as otherwise noted.      OBJECTIVE:   /62 (BP Location: Right arm, Patient Position: Chair, Cuff Size: Adult Regular)  Pulse 76  Temp 98.6  F (37  C) (Oral)  Resp 16  Wt 175 lb 9.6 oz (79.7 kg)  SpO2 94%  BMI 25.38 kg/m2  Body mass index is 25.38 kg/(m^2).  GENERAL APPEARANCE: healthy, alert and no distress  EYES: Eyes grossly normal to inspection, PERRLA, conjunctivae and sclerae without injection or discharge, EOM intact   MS: No musculoskeletal defects are noted and gait is age appropriate without ataxia   SKIN: Right lower leg - medial side of ankle - 2 large lacerations ~2 cm in length each, proximal one 2 stitches and distal one 1 stich, both erythematous with purulent drainage, edema and erythema extend in all directions 8 cm with distal portion with possible fluctuance palpated (see below for remainder)   No other suspicious lesions or rashes, hydration status appears adeuqate with normal skin turgor   PSYCH: Alert and oriented x3; speech- coherent ,  normal rate and volume; able to articulate logical thoughts, able to abstract reason, no tangential thoughts, no hallucinations or delusions, mentation appears normal, Mood is euthymic. Affect is appropriate for this mood state and bright. Thought content is free of suicidal ideation, hallucinations, and delusions. Dress is adequate and upkept. Eye contact is good during conversation.       Diagnostic Test Results:  Wound culture - pending       Progress Note:  Pause for the cause has been completed prior to the prceedure.   1. Patient was identified by both name and date of birth   2. The correct site was identified   3. Site was marked by provider    4. Written informed consent correct and signed or verbal authorization  to proceed was obtained   5. Verifed necessary supplies, equipment, and diagnostics are available    6. Time out was performed immediately prior to procedure    Objective: The lacerations are of the above mentioned size and location and is/are purulent, oozing, erythematous, and edematous. The area was prepped using alcohol swabs and appropriately anesthetized using 5 cc of 1% lidocaine with epi. Using the usual technique, the 3 sutures were removed, incision was re-opened as they had almost completely closed, and drainage of abscess was performed. Copious amounts of purulent discharge was expressed. Both abscesses were probed - proximal laceration has abscess that extends deep 3 mm and in all directions from edges of lacerations 3 mm and distal laceration is only 2 mm deep and extends 3 mm posteriorly and 2 mm in remaining directions from laceration edges. Hemostasis was obtained using 1/4 inch iodoform packing. An appropriate dressing was applied. The procedure was well tolerated and without complications.         ASSESSMENT/PLAN:       ICD-10-CM    1. Cat bite of right foot, subsequent encounter S91.351D doxycycline (VIBRA-TABS) 100 MG tablet    W55.01XD Wound Culture Aerobic Bacterial     Gram  stain   2. Cellulitis and abscess of foot excluding toe L03.119     L02.619      - Discussed infection and abscess formation, likely due to sutures that were placed   - As above, sutures removed and lacerations re-opened and drained  - Packed with iodoform packing and covered with large bandage   - Discussed how this will allow her lacerations to heal from inside out by keeping open   - Start Doxycycline twice a day for 10 days, discussed use and side effects, will allow for additional bacterial coverage   - Continue with Amoxicillin-Clavulanate as prescribed  - Will await culture results, specifically to see if addition of Metronidazole is warranted   - Return to clinic every 3 days for wound repacking   - Do not get area wet, no soaking   - Change outer bandage once a day, leaving packing     The patient indicates understanding of these issues and agrees with the plan.    Follow up: as above       Taylor Jameson PA-C  [facility]

## 2017-08-08 NOTE — NURSING NOTE
"Chief Complaint   Patient presents with     Infection       Initial /62 (BP Location: Right arm, Patient Position: Chair, Cuff Size: Adult Regular)  Pulse 76  Temp 98.6  F (37  C) (Oral)  Resp 16  Wt 175 lb 9.6 oz (79.7 kg)  SpO2 94%  BMI 25.38 kg/m2 Estimated body mass index is 25.38 kg/(m^2) as calculated from the following:    Height as of 3/15/17: 5' 9.75\" (1.772 m).    Weight as of this encounter: 175 lb 9.6 oz (79.7 kg).  Medication Reconciliation: complete  "

## 2017-08-11 ENCOUNTER — ALLIED HEALTH/NURSE VISIT (OUTPATIENT)
Dept: FAMILY MEDICINE | Facility: OTHER | Age: 77
End: 2017-08-11
Payer: COMMERCIAL

## 2017-08-11 DIAGNOSIS — Z51.89 ENCOUNTER FOR WOUND CARE: Primary | ICD-10-CM

## 2017-08-11 PROCEDURE — 99207 ZZC NO CHARGE NURSE ONLY: CPT

## 2017-08-11 NOTE — PROGRESS NOTES
Wound location: Right medial ankle    Approximately 10 inches of Iodoform packing was removed from one wound and 12 inches of Iodoform packing was removed from the second wound. There was scant bloody drainage on the Band-Aid. There was scant serosanguinous drainage on the packing.     Top Wound  Length 1/2 inch  Depth 1/2 inch  Wound extends downward from opening without tunneling. Approximately 7.5 inches of Iodoform packing was inserted in sterile fashion without complication. No pain was noted and patient tolerated well.    Bottom Wound  Length 3/4 inches long with 1/2 inch opening  Depth 1/2 inch  Wound extends downward and to the left of the opening without tunneling. Approximately 5 inches of Iodoform packing was inserted in sterile fashion without complication. There was some mild pain noted, but patient tolerated well.     Damian Jameson did evaluate wound today and noted improvement from previous. There is some mild swelling and warmth of the ankle, with redness of the skin extending upward from the top wound. Patient is tolerating antibiotics and is now able to walk normally instead of on her tip toes. She will follow up for her next packing on Monday, 8/14.     Kourtney Lui, RN, BSN

## 2017-08-11 NOTE — MR AVS SNAPSHOT
After Visit Summary   8/11/2017    Pebbles Powell    MRN: [id number]           Patient Information     Date Of Birth          1940        Visit Information        Provider Department      8/11/2017 9:30 AM NL RN TEAM A, DAYRON [facility]        Today's Diagnoses     Encounter for wound care    -  1       Follow-ups after your visit        Your next 10 appointments already scheduled     Aug 14, 2017 10:00 AM CDT   Nurse [facility] with NL RN TEAM A, [facility]    [address]  [place] [address]   [phone]            Aug 18, 2017 10:40 AM CDT   Office Visit with Carolyn Domingo MD   [facility] ([facility] )    [address] [place] [address] [id number] [phone]           Bring a current list of meds and any records pertaining to this visit. For Physicals, please bring immunization records and any forms needing to be filled out. Please arrive 10 minutes early to complete paperwork.              Who to contact     If you have questions or need follow up information about today's clinic visit or your schedule please contact [facility] directly at [phone].  Normal or non-critical lab and imaging results will be communicated to you by Junt[employer] Finanz[employer]art, letter or phone within 4 business days after the clinic has received the results. If you do not hear from us within 7 days, please contact the clinic through [employer] or phone. If you have a critical or abnormal lab result, we will notify you by phone as soon as possible.  Submit refill requests through EndorphMe or call your pharmacy and they will forward the refill request to us. Please allow 3 business days for your refill to be completed.          Additional Information About Your Visit        [employer] Information     End[employer]Me gives you secure access to your electronic health  record. If you see a primary care provider, you can also send messages to your care team and make appointments. If you have questions, please call your primary care clinic.  If you do not have a primary care provider, please call [phone] and they will assist you.        Care EveryWhere ID     This is your Care EveryWhere ID. This could be used by other organizations to access your [place] medical records  [id number]         Blood Pressure from Last 3 Encounters:   08/08/17 116/62   10/05/16 120/60   04/18/16 121/57    Weight from Last 3 Encounters:   08/08/17 175 lb 9.6 oz (79.7 kg)   03/15/17 187 lb (84.8 kg)   10/05/16 187 lb (84.8 kg)              Today, you had the following     No orders found for display       Primary Care Provider Office Phone # Fax #    Janiya Yady Fraser [phone]       [address] [place] [address]        Equal Access to Services     ISAÍAS [facility] : Hadii aad ku hadasho Soomaali, waaxda luqadaha, qaybta kaalmada adeegyada, waxay idiin hayjulio cn clayton claudio . So [facility] [phone].    ATENCIÓN: Si habla español, tiene a caal disposición servicios gratuitos de asistencia lingüística. [facility] [phone].    We comply with applicable federal civil rights laws and [place] laws. We do not discriminate on the basis of race, color, national origin, age, disability sex, sexual orientation or gender identity.            Thank you!     Thank you for choosing [facility]  for your care. Our goal is always to provide you with excellent care. Hearing back from our patients is one way we can continue to improve our services. Please take a few minutes to complete the written survey that you may receive in the mail after your visit with us. Thank you!             Your Updated Medication List - Protect others around you: Learn how to safely use, store and throw away your medicines at www.disposemymeds.org.          This list is accurate as of: 8/11/17  10:21 AM.  Always use your most recent med list.                   Brand Name Dispense Instructions for use Diagnosis    acetaminophen 650 MG 8 hour tablet     100 tablet    Take 650 mg by mouth every 6 hours    Status post total right knee replacement       AUGMENTIN PO           doxycycline 100 MG tablet    VIBRA-TABS    20 tablet    Take 1 tablet (100 mg) by mouth 2 times daily    Cat bite of right foot, subsequent encounter       FERROUS GLUCONATE PO      Take 324 mg by mouth every other day        furosemide 20 MG tablet    LASIX    90 tablet    TAKE ONE TABLET BY MOUTH ONCE DAILY IN THE MORNING    Chronic diastolic congestive heart failure (H)       levothyroxine 100 MCG tablet    SYNTHROID/LEVOTHROID    90 tablet    TAKE ONE TABLET BY MOUTH ONCE DAILY    Hypothyroidism       Magnesium 500 MG Caps           multivitamin per tablet      1 TABLET DAILY brand varies per pt        order for DME     1 Device    Equipment being ordered: compression stockings, med strength    Unspecified venous (peripheral) insufficiency       VITAMIN B 12 PO      Take 2,500 mcg by mouth

## 2017-08-12 LAB
BACTERIA SPEC CULT: ABNORMAL
Lab: ABNORMAL
MICRO REPORT STATUS: ABNORMAL
MICROORGANISM SPEC CULT: ABNORMAL
SPECIMEN SOURCE: ABNORMAL

## 2017-08-14 ENCOUNTER — TELEPHONE (OUTPATIENT)
Dept: LAB | Facility: OTHER | Age: 77
End: 2017-08-14

## 2017-08-14 ENCOUNTER — ALLIED HEALTH/NURSE VISIT (OUTPATIENT)
Dept: FAMILY MEDICINE | Facility: OTHER | Age: 77
End: 2017-08-14
Payer: COMMERCIAL

## 2017-08-14 DIAGNOSIS — D50.8 OTHER IRON DEFICIENCY ANEMIA: ICD-10-CM

## 2017-08-14 DIAGNOSIS — Z51.89 ENCOUNTER FOR WOUND CARE: Primary | ICD-10-CM

## 2017-08-14 DIAGNOSIS — N18.30 CKD (CHRONIC KIDNEY DISEASE) STAGE 3, GFR 30-59 ML/MIN (H): Primary | ICD-10-CM

## 2017-08-14 DIAGNOSIS — N18.30 CHRONIC KIDNEY DISEASE, STAGE III (MODERATE) (H): Primary | ICD-10-CM

## 2017-08-14 LAB
ALBUMIN SERPL-MCNC: 3.3 G/DL (ref 3.4–5)
ALP SERPL-CCNC: 70 U/L (ref 40–150)
ALT SERPL W P-5'-P-CCNC: 16 U/L (ref 0–50)
ANION GAP SERPL CALCULATED.3IONS-SCNC: 8 MMOL/L (ref 3–14)
AST SERPL W P-5'-P-CCNC: 15 U/L (ref 0–45)
BILIRUB SERPL-MCNC: 0.3 MG/DL (ref 0.2–1.3)
BUN SERPL-MCNC: 33 MG/DL (ref 7–30)
CALCIUM SERPL-MCNC: 8.6 MG/DL (ref 8.5–10.1)
CHLORIDE SERPL-SCNC: 104 MMOL/L (ref 94–109)
CO2 SERPL-SCNC: 27 MMOL/L (ref 20–32)
CREAT SERPL-MCNC: 1.55 MG/DL (ref 0.52–1.04)
GFR SERPL CREATININE-BSD FRML MDRD: 33 ML/MIN/1.7M2
GLUCOSE SERPL-MCNC: 87 MG/DL (ref 70–99)
POTASSIUM SERPL-SCNC: 4.6 MMOL/L (ref 3.4–5.3)
PROT SERPL-MCNC: 7.1 G/DL (ref 6.8–8.8)
SODIUM SERPL-SCNC: 139 MMOL/L (ref 133–144)

## 2017-08-14 PROCEDURE — 99207 ZZC NO CHARGE NURSE ONLY: CPT

## 2017-08-14 PROCEDURE — 36415 COLL VENOUS BLD VENIPUNCTURE: CPT | Performed by: PHYSICIAN ASSISTANT

## 2017-08-14 PROCEDURE — 80053 COMPREHEN METABOLIC PANEL: CPT | Performed by: PHYSICIAN ASSISTANT

## 2017-08-14 NOTE — PROGRESS NOTES
Alice Rogel    Your wound results showed growth of Pasteurella - which is a bacteria found in cats. It is susceptible to the medications I have you on, no need to change.     The results are attached for your review.       Damian Jameson PA-C

## 2017-08-14 NOTE — MR AVS SNAPSHOT
After Visit Summary   8/14/2017    Pebbles Powell    MRN: [id number]           Patient Information     Date Of Birth          1940        Visit Information        Provider Department      8/14/2017 10:00 AM NL RN TEAM A, [facility]        Today's Diagnoses     Encounter for wound care    -  1       Follow-ups after your visit        Your next 10 appointments already scheduled     Aug 16, 2017  8:30 AM CDT   Nurse Only with NL RN TEAM A, DAYRON   [facility] ([facility])    [address]  [facility] [address] [phone]            Aug 18, 2017 10:40 AM CDT   Office Visit with Carolyn Domingo MD   [facility] ([facility] )    [address] [place] [id number] [phone]           Bring a current list of meds and any records pertaining to this visit. For Physicals, please bring immunization records and any forms needing to be filled out. Please arrive 10 minutes early to complete paperwork.              Who to contact     If you have questions or need follow up information about today's clinic visit or your schedule please contact [facility] directly at [phone].  Normal or non-critical lab and imaging results will be communicated to you by Flagrhart, letter or phone within 4 business days after the clinic has received the results. If you do not hear from us within 7 days, please contact the clinic through Flagrhart or phone. If you have a critical or abnormal lab result, we will notify you by phone as soon as possible.  Submit refill requests through [employer] or call your pharmacy and they will forward the refill request to us. Please allow 3 business days for your refill to be completed.          Additional Information About Your Visit        FlagrharPeerApp Information     [employer] gives you secure access to your electronic health  record. If you see a primary care provider, you can also send messages to your care team and make appointments. If you have questions, please call your primary care clinic.  If you do not have a primary care provider, please call [phone] and they will assist you.        Care EveryWhere ID     This is your Care EveryWhere ID. This could be used by other organizations to access your [place] medical records  [id number]         Blood Pressure from Last 3 Encounters:   08/08/17 116/62   10/05/16 120/60   04/18/16 121/57    Weight from Last 3 Encounters:   08/08/17 175 lb 9.6 oz (79.7 kg)   03/15/17 187 lb (84.8 kg)   10/05/16 187 lb (84.8 kg)              Today, you had the following     No orders found for display       Primary Care Provider Office Phone # Fax #    Janiya Yady Fraser [phone]       [address] [place] [address]        Equal Access to Services     ISAÍAS [facility] : Hadii aad ku hadasho Soomaali, waaxda luqadaha, qaybta kaalmada adeegyada, waxay idiin hayjulio cn clayton claudio . So [facility] [phone].    ATENCIÓN: Si habla español, tiene a caal disposición servicios gratuitos de asistencia lingüística. [facility] [phone].    We comply with applicable federal civil rights laws and [place] laws. We do not discriminate on the basis of race, color, national origin, age, disability sex, sexual orientation or gender identity.            Thank you!     Thank you for choosing [facility]  for your care. Our goal is always to provide you with excellent care. Hearing back from our patients is one way we can continue to improve our services. Please take a few minutes to complete the written survey that you may receive in the mail after your visit with us. Thank you!             Your Updated Medication List - Protect others around you: Learn how to safely use, store and throw away your medicines at www.disposemymeds.org.          This list is accurate as of: 8/14/17  10:55 AM.  Always use your most recent med list.                   Brand Name Dispense Instructions for use Diagnosis    acetaminophen 650 MG 8 hour tablet     100 tablet    Take 650 mg by mouth every 6 hours    Status post total right knee replacement       AUGMENTIN PO           doxycycline 100 MG tablet    VIBRA-TABS    20 tablet    Take 1 tablet (100 mg) by mouth 2 times daily    Cat bite of right foot, subsequent encounter       FERROUS GLUCONATE PO      Take 324 mg by mouth every other day        furosemide 20 MG tablet    LASIX    90 tablet    TAKE ONE TABLET BY MOUTH ONCE DAILY IN THE MORNING    Chronic diastolic congestive heart failure (H)       levothyroxine 100 MCG tablet    SYNTHROID/LEVOTHROID    90 tablet    TAKE ONE TABLET BY MOUTH ONCE DAILY    Hypothyroidism       Magnesium 500 MG Caps           multivitamin per tablet      1 TABLET DAILY brand varies per pt        order for DME     1 Device    Equipment being ordered: compression stockings, med strength    Unspecified venous (peripheral) insufficiency       VITAMIN B 12 PO      Take 2,500 mcg by mouth

## 2017-08-14 NOTE — TELEPHONE ENCOUNTER
Patient was a walk in stating she needed labs due to her cat bite and antibiotics she is currently taking. I drew only for chemistries. jessica place orders as necessary. Pebbles was NOT FASTING. Misty Andersen

## 2017-08-14 NOTE — TELEPHONE ENCOUNTER
Alice Rogel    Your lab results were normal/stable.     The results are attached for your review.       Damian Jameson PA-C

## 2017-08-14 NOTE — TELEPHONE ENCOUNTER
Patient did not need labs for cat bite. She was coming for labs that she is due for in Sept. Signed.    Damian Jameson PA-C  [facility]

## 2017-08-14 NOTE — PROGRESS NOTES
Wound location: Right medial ankle     Approximately 6 inches of Iodoform packing was removed from top wound and 4 inches of Iodoform packing was removed from the bottom wound. There was scant bloody drainage on the Band-Aid. There was scant serosanguinous drainage on the packing.      Top Wound  Length 3/8 inch  Depth 1/4 inch  Wound extends downward from opening without tunneling. Approximately 4 inches of Iodoform packing was inserted in sterile fashion without complication. No pain was noted and patient tolerated well.     Bottom Wound  Length 1/2 inche  Depth 3/8 inch  Wound extends downward and to the left of the opening without tunneling. Approximately 5 inches of Iodoform packing was inserted in sterile fashion without complication. There was some mild pain noted, but patient tolerated well.      Pt had mentioned some pain in her right knee and is concerned this may be from the infection going to her knee replacement.  Assured her that she is on two antibiotics and that the infection was caught early.  Spoke with IGNACIA Velez, about this concern.  CDL is not concerned about that at this time.  If wounds are not improving by Wednesday, CDL recommends wound care.    Pt to return to clinic on Wednesday, 8/16, for another wound packing.    Anyi Phillips RN

## 2017-08-15 DIAGNOSIS — E03.9 HYPOTHYROIDISM: ICD-10-CM

## 2017-08-15 RX ORDER — LEVOTHYROXINE SODIUM 100 UG/1
TABLET ORAL
Qty: 90 TABLET | Refills: 2 | Status: SHIPPED | OUTPATIENT
Start: 2017-08-15 | End: 2018-03-29

## 2017-08-15 NOTE — TELEPHONE ENCOUNTER
levothyroxine (SYNTHROID/LEVOTHROID) 100 MCG tablet     Last Written Prescription Date: 05/19/17  Last Quantity: 90, # refills: 0  Last Office Visit with [facility], [facility] or [facility] prescribing provider: 08/08/17   Next 5 appointments (look out 90 days)     Aug 16, 2017  8:30 AM CDT   Nurse Only with NL RN TEAM A, DAYRON   [facility] ([facility])    [address]  [facility] [address]   [phone]            Aug 18, 2017 10:40 AM CDT   Office Visit with Carolyn Domingo MD   [facility] ([facility] )    [address] [place] [id number] [phone]                   TSH   Date Value Ref Range Status   05/22/2017 1.70 0.40 - 4.00 mU/L Final

## 2017-08-16 ENCOUNTER — ALLIED HEALTH/NURSE VISIT (OUTPATIENT)
Dept: FAMILY MEDICINE | Facility: OTHER | Age: 77
End: 2017-08-16
Payer: COMMERCIAL

## 2017-08-16 DIAGNOSIS — Z51.89 ENCOUNTER FOR WOUND CARE: Primary | ICD-10-CM

## 2017-08-16 PROCEDURE — 99207 ZZC NO CHARGE NURSE ONLY: CPT

## 2017-08-16 NOTE — PROGRESS NOTES
Wound location: Right medial ankle      Approximately 5 inches of Iodoform packing was removed from top wound and 4 inches of Iodoform packing was removed from the bottom wound. There was bloody drainage on the Band-Aid. There was scant serosanguinous drainage on the packing.       Top Wound  Length 1/2 inch  Depth 1/4 inch  Wound extends downward from opening without tunneling. Approximately 2 1/2 inches of Iodoform packing was inserted in sterile fashion without complication. No pain was noted and patient tolerated well.      Bottom Wound  Length 3/8 inch  Depth 1/4 inch  Wound extends downward and to the left of the opening without tunneling. Approximately 2 1/4 inches of Iodoform packing was inserted in sterile fashion without complication. No pain was noted and patient tolerated well.    Pt is concerned that wounds are not healing as quickly as she had hoped.  I assured her that there is continuing improvement.  I also informed her that CDL was okay with putting in a referral to wound care if there was no improvement.  Since there was improvement seen today, pt is willing to wait to reassess on Friday before putting in a referral to wound care.  Pt would also like CDL to take another look at it.     Pt to return to clinic on Friday, 8/18, for another wound packing.     Anyi Phillips RN

## 2017-08-16 NOTE — MR AVS SNAPSHOT
After Visit Summary   8/16/2017    Pebbles Powell    MRN: [id number]           Patient Information     Date Of Birth          1940        Visit Information        Provider Department      8/16/2017 8:30 AM NL RN TEAM A, DAYRON [facility]        Today's Diagnoses     Encounter for wound care    -  1       Follow-ups after your visit        Your next 10 appointments already scheduled     Aug 18, 2017 10:40 AM CDT   Office Visit with Carolyn Domingo MD   [facility] ([facility] )    [address] [place] [address]  [address] [place] [id number] [phone]           Bring a current list of meds and any records pertaining to this visit. For Physicals, please bring immunization records and any forms needing to be filled out. Please arrive 10 minutes early to complete paperwork.            Aug 18, 2017  1:30 PM CDT   Nurse Only with NL RN TEAM A, DAYRON   [facility] ([facility])    [address]  [facility] [id number] [phone]              Who to contact     If you have questions or need follow up information about today's clinic visit or your schedule please contact [facility] directly at [phone].  Normal or non-critical lab and imaging results will be communicated to you by MyChart, letter or phone within 4 business days after the clinic has received the results. If you do not hear from us within 7 days, please contact the clinic through MyChart or phone. If you have a critical or abnormal lab result, we will notify you by phone as soon as possible.  Submit refill requests through [employer] or call your pharmacy and they will forward the refill request to us. Please allow 3 business days for your refill to be completed.          Additional Information About Your Visit        Content Syndicate: [employer] Information     [facility] gives you secure access to your electronic health  record. If you see a primary care provider, you can also send messages to your care team and make appointments. If you have questions, please call your primary care clinic.  If you do not have a primary care provider, please call [phone] and they will assist you.        Care EveryWhere ID     This is your Care EveryWhere ID. This could be used by other organizations to access your [facility] medical records  [id number]         Blood Pressure from Last 3 Encounters:   08/08/17 116/62   10/05/16 120/60   04/18/16 121/57    Weight from Last 3 Encounters:   08/08/17 175 lb 9.6 oz (79.7 kg)   03/15/17 187 lb (84.8 kg)   10/05/16 187 lb (84.8 kg)              Today, you had the following     No orders found for display       Primary Care Provider Office Phone # Fax #    Janiya Yady Fraser [phone]       [address] [place] [id number]        Equal Access to Services     ISAÍAS [facility] : Hadii aad ku hadasho Soomaali, waaxda luqadaha, qaybta kaalmada adeegyada, waxay idiin hayjulio cn clayton claudio . So [facility] [place] [phone].    ATENCIÓN: Si habla español, tiene a caal disposición servicios gratuitos de asistencia lingüística. [facility] [phone].    We comply with applicable federal civil rights laws and [place] laws. We do not discriminate on the basis of race, color, national origin, age, disability sex, sexual orientation or gender identity.            Thank you!     Thank you for choosing [facility]  for your care. Our goal is always to provide you with excellent care. Hearing back from our patients is one way we can continue to improve our services. Please take a few minutes to complete the written survey that you may receive in the mail after your visit with us. Thank you!             Your Updated Medication List - Protect others around you: Learn how to safely use, store and throw away your medicines at www.disposemymeds.org.          This list is accurate as of: 8/16/17   8:49 AM.  Always use your most recent med list.                   Brand Name Dispense Instructions for use Diagnosis    acetaminophen 650 MG 8 hour tablet     100 tablet    Take 650 mg by mouth every 6 hours    Status post total right knee replacement       AUGMENTIN PO           doxycycline 100 MG tablet    VIBRA-TABS    20 tablet    Take 1 tablet (100 mg) by mouth 2 times daily    Cat bite of right foot, subsequent encounter       FERROUS GLUCONATE PO      Take 324 mg by mouth every other day        furosemide 20 MG tablet    LASIX    90 tablet    TAKE ONE TABLET BY MOUTH ONCE DAILY IN THE MORNING    Chronic diastolic congestive heart failure (H)       levothyroxine 100 MCG tablet    SYNTHROID/LEVOTHROID    90 tablet    TAKE ONE TABLET BY MOUTH ONCE DAILY    Hypothyroidism       Magnesium 500 MG Caps           multivitamin per tablet      1 TABLET DAILY brand varies per pt        order for DME     1 Device    Equipment being ordered: compression stockings, med strength    Unspecified venous (peripheral) insufficiency       VITAMIN B 12 PO      Take 2,500 mcg by mouth

## 2017-08-18 ENCOUNTER — ALLIED HEALTH/NURSE VISIT (OUTPATIENT)
Dept: FAMILY MEDICINE | Facility: OTHER | Age: 77
End: 2017-08-18
Payer: COMMERCIAL

## 2017-08-18 ENCOUNTER — OFFICE VISIT (OUTPATIENT)
Dept: FAMILY MEDICINE | Facility: CLINIC | Age: 77
End: 2017-08-18
Payer: COMMERCIAL

## 2017-08-18 DIAGNOSIS — Z85.828 HISTORY OF BASAL CELL CARCINOMA: ICD-10-CM

## 2017-08-18 DIAGNOSIS — Z51.89 ENCOUNTER FOR WOUND CARE: Primary | ICD-10-CM

## 2017-08-18 DIAGNOSIS — L57.8 ACTINIC SKIN DAMAGE: ICD-10-CM

## 2017-08-18 DIAGNOSIS — D48.5 NEOPLASM OF UNCERTAIN BEHAVIOR OF SKIN: Primary | ICD-10-CM

## 2017-08-18 PROCEDURE — 11300 SHAVE SKIN LESION 0.5 CM/<: CPT | Performed by: FAMILY MEDICINE

## 2017-08-18 PROCEDURE — 99213 OFFICE O/P EST LOW 20 MIN: CPT | Mod: 25 | Performed by: FAMILY MEDICINE

## 2017-08-18 PROCEDURE — 99207 ZZC NO CHARGE NURSE ONLY: CPT

## 2017-08-18 PROCEDURE — 11301 SHAVE SKIN LESION 0.6-1.0 CM: CPT | Performed by: FAMILY MEDICINE

## 2017-08-18 PROCEDURE — 88305 TISSUE EXAM BY PATHOLOGIST: CPT | Performed by: FAMILY MEDICINE

## 2017-08-18 PROCEDURE — 99000 SPECIMEN HANDLING OFFICE-LAB: CPT | Performed by: FAMILY MEDICINE

## 2017-08-18 PROCEDURE — 11301 SHAVE SKIN LESION 0.6-1.0 CM: CPT | Mod: 59 | Performed by: FAMILY MEDICINE

## 2017-08-18 NOTE — MR AVS SNAPSHOT
"              After Visit Summary   8/18/2017    Pebbles Powell    MRN: [id number]           Patient Information     Date Of Birth          1940        Visit Information        Provider Department      8/18/2017 10:40 AM Carolyn Domingo MD [facility]        Today's Diagnoses     Neoplasm of uncertain behavior of skin    -  1    History of basal cell carcinoma        Actinic skin damage          Care Instructions    FUTURE APPOINTMENTS  Follow up per pathology report.    WOUND CARE INSTRUCTIONS  1. After 24 hours, change dressing daily.  2. Wash hands before every dressing change.  3. Wash the wound area with a mild soap, then rinse  4. Gently pat dry with a sterile gauze or Q-tip.  5. Apply Vaseline or Aquaphor only over entire wound. Do NOT use Neosporin - as many people react to neomycin.  6. Finally, cover with a bandage or sterile non-stick gauze with micropore paper tape.  7. Repeat once daily until wound has healed.    Do not let the wound dry out.  The wound will heal faster and with better cosmetic results if routinely kept moist with step #5. It is a false belief that a wound heals better when it is exposed to air and allowed to dry out.      Soap, water and shampoo will not hurt this area.    Do not go swimming or take baths, but showering is encouraged.    Limit use of the area where the procedure was done for a few days to allow for optimal healing.    If you experience bleeding:  Repeat steps #2-5 and hold firm pressure on the area for 10 minutes without checking to see if the bleeding has stopped. \"Checking\" pulls off the protective wound clot and restarts the bleeding all over again. Re-apply pressure for 10 minutes if necessary to stop bleeding.  Use additional sterile gauze and tape to maintain pressure once bleeding has stopped.    Signs of Infection:  Infection can occur in any area where skin has been disrupted.  If you notice persistent redness, " swelling, colored drainage, increasing pain, fever or other signs of infection, please call us at: [phone] and ask to have me or my colleague paged. We will call you back to discuss.    Pathology Results:  You will be notified, generally via letter or MyChart, in approximately 10 days. If there is anything we need to discuss or further treatment needed, I will call you to discuss it.    Skin tissue can be some of the most challenging tissue for pathologists to examine, therefore we may use a specialist outside the [facility] system to read certain submitted tissue samples. When submitted to these outside specialists, [employer] will notify you that your tissue sample result has returned. However, this only means that the tissue sample has been sent to the specialist. These results are not available in [employer], so I will contact you when I receive the final report.    PATIENT INFORMATION : WOUNDS  During the healing process you will notice a number of changes. All wounds develop a small halo of redness surrounding the wound.  This means healing is occurring. Severe itching with extensive redness usually indicates sensitivity to the ointment or bandage tape used to dress the wound.  You should call our office if this develops.      Swelling  and/or discoloration around your surgical site is common, particularly when performed around the eye.    All wounds normally drain.  The larger the wound the more drainage there will be.  After 7-10 days, you will notice the wound beginning to shrink and new skin will begin to grow.  The wound is healed when you can see skin has formed over the entire area.  A healed wound has a healthy, shiny look to the surface and is red to dark pink in color to normalize.  Wounds may take approximately 4-6 weeks to heal.  Larger wounds may take 6-8 weeks. After the wound is healed you may discontinue dressing changes.    You may experience a sensation of tightness as your wound heals. This  is normal and will gradually subside.    Your healed wound may be sensitive to temperature changes. This sensitivity improves with time, but if you re having a lot of discomfort, try to avoid temperature extremes.    Patients frequently experience itching after their wound appears to have healed because of the continue healing under the skin.  Plain Vaseline will help relieve the itching.            Follow-ups after your visit        Your next 10 appointments already scheduled     Aug 18, 2017  1:30 PM CDT   Nurse Only with NL RN TEAM A, DAYRON   [facility] ([facility])    [address]  [facility] [address]   [phone]              Who to contact     If you have questions or need follow up information about today's clinic visit or your schedule please contact [facility] directly at [phone].  Normal or non-critical lab and imaging results will be communicated to you by [employer], letter or phone within 4 business days after the clinic has received the results. If you do not hear from us within 7 days, please contact the clinic through [employer] or phone. If you have a critical or abnormal lab result, we will notify you by phone as soon as possible.  Submit refill requests through [email or web address] or call your pharmacy and they will forward the refill request to us. Please allow 3 business days for your refill to be completed.          Additional Information About Your Visit        [employer] Information     [email or web address] gives you secure access to your electronic health record. If you see a primary care provider, you can also send messages to your care team and make appointments. If you have questions, please call your primary care clinic.  If you do not have a primary care provider, please call [phone] and they will assist you.        Care EveryWhere ID     This is your Care EveryWhere ID. This could be used by other organizations to access your [facility]  medical records  [id number]         Blood Pressure from Last 3 Encounters:   08/08/17 116/62   10/05/16 120/60   04/18/16 121/57    Weight from Last 3 Encounters:   08/08/17 175 lb 9.6 oz (79.7 kg)   03/15/17 187 lb (84.8 kg)   10/05/16 187 lb (84.8 kg)              Today, you had the following     No orders found for display       Primary Care Provider Office Phone # Fax #    Janiya Yady Fraser [phone]       [address] [facility] [address]        Equal Access to Services     [facility]: Hadii janet Porras, wapatriciada blanca, aliata kaalmada anita, corey claudio . So [facility] [phone].    ATENCIÓN: Si habla español, tiene a caal disposición servicios gratuitos de asistencia lingüística. Ll[facility] [phone].    We comply with applicable federal civil rights laws and [place] laws. We do not discriminate on the basis of race, color, national origin, age, disability sex, sexual orientation or gender identity.            Thank you!     Thank you for choosing [facility]  for your care. Our goal is always to provide you with excellent care. Hearing back from our patients is one way we can continue to improve our services. Please take a few minutes to complete the written survey that you may receive in the mail after your visit with us. Thank you!             Your Updated Medication List - Protect others around you: Learn how to safely use, store and throw away your medicines at www.disposemymeds.org.          This list is accurate as of: 8/18/17 11:16 AM.  Always use your most recent med list.                   Brand Name Dispense Instructions for use Diagnosis    acetaminophen 650 MG 8 hour tablet     100 tablet    Take 650 mg by mouth every 6 hours    Status post total right knee replacement       AUGMENTIN PO           doxycycline 100 MG tablet    VIBRA-TABS    20 tablet    Take 1 tablet (100 mg) by mouth 2 times daily    Cat  bite of right foot, subsequent encounter       FERROUS GLUCONATE PO      Take 324 mg by mouth every other day        furosemide 20 MG tablet    LASIX    90 tablet    TAKE ONE TABLET BY MOUTH ONCE DAILY IN THE MORNING    Chronic diastolic congestive heart failure (H)       levothyroxine 100 MCG tablet    SYNTHROID/LEVOTHROID    90 tablet    TAKE ONE TABLET BY MOUTH ONCE DAILY    Hypothyroidism       Magnesium 500 MG Caps           multivitamin per tablet      1 TABLET DAILY brand varies per pt        order for DME     1 Device    Equipment being ordered: compression stockings, med strength    Unspecified venous (peripheral) insufficiency       VITAMIN B 12 PO      Take 2,500 mcg by mouth

## 2017-08-18 NOTE — PATIENT INSTRUCTIONS
"FUTURE APPOINTMENTS  Follow up per pathology report.    WOUND CARE INSTRUCTIONS  1. After 24 hours, change dressing daily.  2. Wash hands before every dressing change.  3. Wash the wound area with a mild soap, then rinse  4. Gently pat dry with a sterile gauze or Q-tip.  5. Apply Vaseline or Aquaphor only over entire wound. Do NOT use Neosporin - as many people react to neomycin.  6. Finally, cover with a bandage or sterile non-stick gauze with micropore paper tape.  7. Repeat once daily until wound has healed.    Do not let the wound dry out.  The wound will heal faster and with better cosmetic results if routinely kept moist with step #5. It is a false belief that a wound heals better when it is exposed to air and allowed to dry out.      Soap, water and shampoo will not hurt this area.    Do not go swimming or take baths, but showering is encouraged.    Limit use of the area where the procedure was done for a few days to allow for optimal healing.    If you experience bleeding:  Repeat steps #2-5 and hold firm pressure on the area for 10 minutes without checking to see if the bleeding has stopped. \"Checking\" pulls off the protective wound clot and restarts the bleeding all over again. Re-apply pressure for 10 minutes if necessary to stop bleeding.  Use additional sterile gauze and tape to maintain pressure once bleeding has stopped.    Signs of Infection:  Infection can occur in any area where skin has been disrupted.  If you notice persistent redness, swelling, colored drainage, increasing pain, fever or other signs of infection, please call us at: [phone] and ask to have me or my colleague paged. We will call you back to discuss.    Pathology Results:  You will be notified, generally via letter or MyChart, in approximately 10 days. If there is anything we need to discuss or further treatment needed, I will call you to discuss it.    Skin tissue can be some of the most challenging tissue for pathologists " to examine, therefore we may use a specialist outside the [facility] system to read certain submitted tissue samples. When submitted to these outside specialists, [employer] will notify you that your tissue sample result has returned. However, this only means that the tissue sample has been sent to the specialist. These results are not available in [employer], so I will contact you when I receive the final report.    PATIENT INFORMATION : WOUNDS  During the healing process you will notice a number of changes. All wounds develop a small halo of redness surrounding the wound.  This means healing is occurring. Severe itching with extensive redness usually indicates sensitivity to the ointment or bandage tape used to dress the wound.  You should call our office if this develops.      Swelling  and/or discoloration around your surgical site is common, particularly when performed around the eye.    All wounds normally drain.  The larger the wound the more drainage there will be.  After 7-10 days, you will notice the wound beginning to shrink and new skin will begin to grow.  The wound is healed when you can see skin has formed over the entire area.  A healed wound has a healthy, shiny look to the surface and is red to dark pink in color to normalize.  Wounds may take approximately 4-6 weeks to heal.  Larger wounds may take 6-8 weeks. After the wound is healed you may discontinue dressing changes.    You may experience a sensation of tightness as your wound heals. This is normal and will gradually subside.    Your healed wound may be sensitive to temperature changes. This sensitivity improves with time, but if you re having a lot of discomfort, try to avoid temperature extremes.    Patients frequently experience itching after their wound appears to have healed because of the continue healing under the skin.  Plain Vaseline will help relieve the itching.

## 2017-08-18 NOTE — PROGRESS NOTES
Wound location: Right medial ankle      Approximately 2.5 inches of Iodoform packing was removed from top wound and 2.5 inches of Iodoform packing was removed from the bottom wound. There was scant bloody drainage on the Band-Aid. There was scant serosanguinous drainage on the packing.       Top Wound  Length 1/2 inch  Depth 1/4 inch  Wound extends downward from opening without tunneling. Approximately 5 cm of Iodoform packing was inserted in sterile fashion without complication. No pain was noted and patient tolerated well.      Bottom Wound  Length 1/2 inch  Depth 1/4 inch  Wound extends downward and to the left of the opening without tunneling. Approximately 5 cm inches of Iodoform packing was inserted in sterile fashion without complication. No pain was noted and patient tolerated well.      Pt to return to clinic on Monday, 8/21, for another wound packing.    Kourtney Lui, RN, BSN

## 2017-08-18 NOTE — PROGRESS NOTES
[facility] (formerly [facility]) - PRIMARY CARE SKIN    CC : Lesion(s)  SUBJECTIVE:                                                    Pebbles Powell is a 76 year old female who presents to clinic today because of scaly red spots on the leg. She has also recently been on antibiotics after a cat bite on the right leg.    Bothersome lesions noticed by the patient or other skin concerns :  Issue One : Lesion on right leg.  Onset : unknown.  Enlarging : YES.  Bleeding : NO  Itchy or irritating : NO.  Pain or tenderness : NO.  Changing color : YES.    Personal history of skin cancer : YES - basal cell carcinoma on right lower leg (s/p excision 7/2014), actinic keratoses (previous Efudex field therapy).  Family history of skin cancer : NO.    Sun Exposure History  Previous history of significant sun exposure:  Blistering sunburns : YES - when younger  Tanning beds : YES - when younger.  Sunscreen Use : YES, SPF : 30.  Sun-protective clothing use : No  Wide-brimmed hats : No  Sunglasses : Yes  Seeks shade : Yes    Occupation : (indoor).    Refer to electronic medical record (EMR) for past medical history and medications.    INTEGUMENTARY/SKIN: POSITIVE for changing lesions  ROS : 14 point review of systems was negative except the symptoms listed above in the HPI.    This document serves as a record of the services and decisions personally performed and made by Prabha Domingo MD. It was created on her behalf by Ollie Lopez, a trained medical scribe.  The creation of this document is based on the scribe's personal observations and the provider's statements to the medical scribe.  Ollie Lopez, August 18, 2017 10:46 AM      OBJECTIVE:                                                    GENERAL: healthy, alert and no distress  SKIN: Otto Skin Type - I.  Legs were examined. The dermatoscope was used to help evaluate pigmented lesions.  Skin Pertinent Findings:  Left lower leg : Scattered, multiple, scaly, erythematous, non-indurated, 3 mm  in size patches consistent with actinic skin damage.    Right lower leg : Scattered 5 mm in size, erythematous, scaly, non-indurated lesion(s) most consistent with actinic keratoses.    Right lateral lower leg, 25 mm right of midline : 6 mm in size, erythematous, indurated lesion. ? Basal cell carcinoma ? Squamous cell carcinoma ? Actinic keratosis ? Other      Right mid-anterior lower leg, 8 cm superior to ankle : 7 mm x 7 mm in size hyperkeratotic, erythematous lesion ? Basal cell carcinoma ? Squamous cell carcinoma ? Actinic keratosis ? Other    Right anterior lower leg, 3 cm medial to midline : 4 mm in size, hyperkeratotic lesion. ? Basal cell carcinoma ? Squamous cell carcinoma ? Actinic keratosis ? Other      Consent for digital photography  The patient was advised that digital photos will be taken today of Pebbles Powell. The patient verbally consented to having these photos taken for purposes of documenting her condition. The patient understands that the images will be stored in her medical record.   Images in media tab of chart review    Diagnostic Test Results:  none     MDM :. Cryotherapy deferred until after pathology report of biopsied lesions today.      ASSESSMENT:                                                      Encounter Diagnoses   Name Primary?     Neoplasm of uncertain behavior of skin Yes     History of basal cell carcinoma      Actinic skin damage          PLAN:                                                    Patient Instructions   FUTURE APPOINTMENTS  Follow up per pathology report.    WOUND CARE INSTRUCTIONS  1. After 24 hours, change dressing daily.  2. Wash hands before every dressing change.  3. Wash the wound area with a mild soap, then rinse  4. Gently pat dry with a sterile gauze or Q-tip.  5. Apply Vaseline or Aquaphor only over entire wound. Do NOT use Neosporin - as many people react to neomycin.  6. Finally, cover with a bandage or sterile non-stick gauze with micropore  "paper tape.  7. Repeat once daily until wound has healed.    Do not let the wound dry out.  The wound will heal faster and with better cosmetic results if routinely kept moist with step #5. It is a false belief that a wound heals better when it is exposed to air and allowed to dry out.      Soap, water and shampoo will not hurt this area.    Do not go swimming or take baths, but showering is encouraged.    Limit use of the area where the procedure was done for a few days to allow for optimal healing.    If you experience bleeding:  Repeat steps #2-5 and hold firm pressure on the area for 10 minutes without checking to see if the bleeding has stopped. \"Checking\" pulls off the protective wound clot and restarts the bleeding all over again. Re-apply pressure for 10 minutes if necessary to stop bleeding.  Use additional sterile gauze and tape to maintain pressure once bleeding has stopped.    Signs of Infection:  Infection can occur in any area where skin has been disrupted.  If you notice persistent redness, swelling, colored drainage, increasing pain, fever or other signs of infection, please call us at: [phone] and ask to have me or my colleague paged. We will call you back to discuss.    Pathology Results:  You will be notified, generally via letter or [facility], in approximately 10 days. If there is anything we need to discuss or further treatment needed, I will call you to discuss it.    Skin tissue can be some of the most challenging tissue for pathologists to examine, therefore we may use a specialist outside the [facility] system to read certain submitted tissue samples. When submitted to these outside specialists, [facility] will notify you that your tissue sample result has returned. However, this only means that the tissue sample has been sent to the specialist. These results are not available in [facility], so I will contact you when I receive the final report.    PATIENT INFORMATION : WOUNDS  During the " healing process you will notice a number of changes. All wounds develop a small halo of redness surrounding the wound.  This means healing is occurring. Severe itching with extensive redness usually indicates sensitivity to the ointment or bandage tape used to dress the wound.  You should call our office if this develops.      Swelling  and/or discoloration around your surgical site is common, particularly when performed around the eye.    All wounds normally drain.  The larger the wound the more drainage there will be.  After 7-10 days, you will notice the wound beginning to shrink and new skin will begin to grow.  The wound is healed when you can see skin has formed over the entire area.  A healed wound has a healthy, shiny look to the surface and is red to dark pink in color to normalize.  Wounds may take approximately 4-6 weeks to heal.  Larger wounds may take 6-8 weeks. After the wound is healed you may discontinue dressing changes.    You may experience a sensation of tightness as your wound heals. This is normal and will gradually subside.    Your healed wound may be sensitive to temperature changes. This sensitivity improves with time, but if you re having a lot of discomfort, try to avoid temperature extremes.    Patients frequently experience itching after their wound appears to have healed because of the continue healing under the skin.  Plain Vaseline will help relieve the itching.          PROCEDURES:                                                    Name : Shave Excision  Indication : Excision of tissue for pathology evaluation.  Location(s) : Right lateral lower leg, 25 mm right of midline : 6 mm in size, erythematous, indurated lesion. ? Basal cell carcinoma ? Squamous cell carcinoma ? Actinic keratosis ? Other.  Completed by : Prabha Domingo MD  Photo Taken : yes.  Anesthesia : Patient was anesthetized by infiltrating the area surrounding the lesion with 1% lidocaine.   epinephrine 1:863677 :  Yes.  Buffered with bicarbonate : Yes.  Note : Discussed the risk of pain, infection, scarring, hypo- or hyperpigmentation and recurrence or need for re-treatment. The benefits of treatment and alternative treatments were also discussed.    During this procedure, the universal protocol was utilized. The patient's identity was confirmed by no less than two patient identifiers, correct procedure was verified, correct site was verified and marked as applicable and a final pause was completed.    Sterile technique was used throughout the procedure. The skin was cleaned and prepped with surgical cleanser. Once adequate anesthesia was obtained, the lesion was removed with a deep scallop shave procedure. The specimen was sent to pathology.    Direct pressure and monsels's and monopolar cautery was applied for hemostasis. No bleeding was present upon the completion of the procedure. The wound was coated with antibacterial ointment. A dry sterile dressing was applied. Patient tolerated the procedure well and left in satisfactory condition.    Primary provider and referring provider will be informed regarding the tissue report when it returns.    Name : Shave Excision  Indication : Excision of tissue for pathology evaluation.  Location(s) : Right mid-anterior lower leg, 8 cm superior to ankle : 7 mm x 7 mm in size hyperkeratotic, erythematous lesion ? Basal cell carcinoma ? Squamous cell carcinoma ? Actinic keratosis ? Other  Completed by : Prabha Domingo MD  Photo Taken : yes.  Anesthesia : Patient was anesthetized by infiltrating the area surrounding the lesion with 1% lidocaine.   epinephrine 1:891058 : Yes.  Buffered with bicarbonate : Yes.  Note : Discussed the risk of pain, infection, scarring, hypo- or hyperpigmentation and recurrence or need for re-treatment. The benefits of treatment and alternative treatments were also discussed.    During this procedure, the universal protocol was utilized. The patient's identity  was confirmed by no less than two patient identifiers, correct procedure was verified, correct site was verified and marked as applicable and a final pause was completed.    Sterile technique was used throughout the procedure. The skin was cleaned and prepped with surgical cleanser. Once adequate anesthesia was obtained, the lesion was removed with a deep scallop shave procedure. The specimen was sent to pathology.    Direct pressure and monsels's and monopolar cautery was applied for hemostasis. No bleeding was present upon the completion of the procedure. The wound was coated with antibacterial ointment. A dry sterile dressing was applied. Patient tolerated the procedure well and left in satisfactory condition.    Primary provider and referring provider will be informed regarding the tissue report when it returns.    Name : Shave Excision  Indication : Excision of tissue for pathology evaluation.  Location(s) : Right anterior lower leg, 3 cm medial to midline : 4 mm in size, hyperkeratotic lesion. ? Basal cell carcinoma ? Squamous cell carcinoma ? Actinic keratosis ? Other.  Completed by : Prabha Domingo MD  Photo Taken : yes.  Anesthesia : Patient was anesthetized by infiltrating the area surrounding the lesion with 1% lidocaine.   epinephrine [id number] : Yes.  Buffered with bicarbonate : Yes.  Note : Discussed the risk of pain, infection, scarring, hypo- or hyperpigmentation and recurrence or need for re-treatment. The benefits of treatment and alternative treatments were also discussed.    During this procedure, the universal protocol was utilized. The patient's identity was confirmed by no less than two patient identifiers, correct procedure was verified, correct site was verified and marked as applicable and a final pause was completed.    Sterile technique was used throughout the procedure. The skin was cleaned and prepped with surgical cleanser. Once adequate anesthesia was obtained, the lesion was removed  with a deep scallop shave procedure. The specimen was sent to pathology.    Direct pressure and monsels's and monopolar cautery was applied for hemostasis. No bleeding was present upon the completion of the procedure. The wound was coated with antibacterial ointment. A dry sterile dressing was applied. Patient tolerated the procedure well and left in satisfactory condition.    Primary provider and referring provider will be informed regarding the tissue report when it returns.      The information in this document, created by the medical scribe for me, accurately reflects the services I personally performed and the decisions made by me. I have reviewed and approved this document for accuracy prior to leaving the patient care area.  Prabha Domingo MD August 18, 2017 10:46 AM  [facility] - PRIMARY CARE SKIN

## 2017-08-18 NOTE — MR AVS SNAPSHOT
After Visit Summary   8/18/2017    Pebbles Powell    MRN: [id number]           Patient Information     Date Of Birth          1940        Visit Information        Provider Department      8/18/2017 1:30 PM NL RN TEAM A, DAY[facility]        Today's Diagnoses     Encounter for wound care    -  1       Follow-ups after your visit        Your next 10 appointments already scheduled     Aug 21, 2017 10:30 AM CDT   Nurse Only with NL RN TEAM A, DAY[facility]   [facility]    [address]  [place] [id number] [phone]              Who to contact     If you have questions or need follow up information about today's clinic visit or your schedule please contact [facility] directly at [phone].  Normal or non-critical lab and imaging results will be communicated to you by [facility] [employer], letter or phone within 4 business days after the clinic has received the results. If you do not hear from us within 7 days, please contact the clinic through [employer] or phone. If you have a critical or abnormal lab result, we will notify you by phone as soon as possible.  Submit refill requests through [employer] or call your pharmacy and they will forward the refill request to us. Please allow 3 business days for your refill to be completed.          Additional Information About Your Visit        MyChart Information     [employer] gives you secure access to your electronic health record. If you see a primary care provider, you can also send messages to your care team and make appointments. If you have questions, please call your primary care clinic.  If you do not have a primary care provider, please call [phone] and they will assist you.        Care EveryWhere ID     This is your Care EveryWhere ID. This could be used by other organizations to access your [facility] medical records  [id number]         Blood Pressure from Last 3 Encounters:    08/08/17 116/62   10/05/16 120/60   04/18/16 121/57    Weight from Last 3 Encounters:   08/08/17 175 lb 9.6 oz (79.7 kg)   03/15/17 187 lb (84.8 kg)   10/05/16 187 lb (84.8 kg)              Today, you had the following     No orders found for display       Primary Care Provider Office Phone # Fax #    Janiya Yady Fraser [phone]       [address] [place] [id number]        Equal Access to Services     : Hadii janet cuevas hadasho Soomaali, waaxda luqadaha, qaybta kaalmada anita, corey claudio . So [facility] [place] [facility] [phone].    ATENCIÓN: Si habla español, tiene a caal disposición servicios gratuitos de asistencia lingüística. [facility] [phone].    We comply with applicable federal civil rights laws and [place] laws. We do not discriminate on the basis of race, color, national origin, age, disability sex, sexual orientation or gender identity.            Thank you!     Thank you for choosing [facility]  for your care. Our goal is always to provide you with excellent care. Hearing back from our patients is one way we can continue to improve our services. Please take a few minutes to complete the written survey that you may receive in the mail after your visit with us. Thank you!             Your Updated Medication List - Protect others around you: Learn how to safely use, store and throw away your medicines at www.disposemymeds.org.          This list is accurate as of: 8/18/17  2:15 PM.  Always use your most recent med list.                   Brand Name Dispense Instructions for use Diagnosis    acetaminophen 650 MG 8 hour tablet     100 tablet    Take 650 mg by mouth every 6 hours    Status post total right knee replacement       AUGMENTIN PO           doxycycline 100 MG tablet    VIBRA-TABS    20 tablet    Take 1 tablet (100 mg) by mouth 2 times daily    Cat bite of right foot, subsequent encounter       FERROUS GLUCONATE PO      Take 324 mg by  mouth every other day        furosemide 20 MG tablet    LASIX    90 tablet    TAKE ONE TABLET BY MOUTH ONCE DAILY IN THE MORNING    Chronic diastolic congestive heart failure (H)       levothyroxine 100 MCG tablet    SYNTHROID/LEVOTHROID    90 tablet    TAKE ONE TABLET BY MOUTH ONCE DAILY    Hypothyroidism       Magnesium 500 MG Caps           multivitamin per tablet      1 TABLET DAILY brand varies per pt        order for DME     1 Device    Equipment being ordered: compression stockings, med strength    Unspecified venous (peripheral) insufficiency       VITAMIN B 12 PO      Take 2,500 mcg by mouth

## 2017-08-21 ENCOUNTER — ALLIED HEALTH/NURSE VISIT (OUTPATIENT)
Dept: FAMILY MEDICINE | Facility: OTHER | Age: 77
End: 2017-08-21
Payer: COMMERCIAL

## 2017-08-21 DIAGNOSIS — Z48.00 CHANGE OR REMOVAL OF WOUND PACKING: Primary | ICD-10-CM

## 2017-08-21 PROCEDURE — 99207 ZZC NO CHARGE NURSE ONLY: CPT

## 2017-08-21 NOTE — NURSING NOTE
Wound location: Right medial ankle      Approximately 5 cm of Iodoform packing was removed from bottom  wound.  Pt states that the packing fell out of the top wound on Saturday. There was scant bloody drainage on the Band-Aid. There was scant serosanguinous drainage on the packing.       Top Wound  Length 7 cm  Depth 7 cm  Approximately 2.5 cm of Iodoform packing was inserted in sterile fashion without complication. No pain was noted and patient tolerated well.      Bottom Wound  Length 8 cm  Depth 7 cm  Wound extends downward and to the left of the opening without tunneling. Approximately 3.5 cm inches of Iodoform packing was inserted in sterile fashion without complication. No pain was noted and patient tolerated well.      Pt to return to clinic on Thursday, 8/24, for another wound packing.     Anyi Phillips RN

## 2017-08-24 ENCOUNTER — ALLIED HEALTH/NURSE VISIT (OUTPATIENT)
Dept: FAMILY MEDICINE | Facility: OTHER | Age: 77
End: 2017-08-24
Payer: COMMERCIAL

## 2017-08-24 DIAGNOSIS — Z51.89 ENCOUNTER FOR WOUND CARE: Primary | ICD-10-CM

## 2017-08-24 PROCEDURE — 99207 ZZC NO CHARGE NURSE ONLY: CPT

## 2017-08-24 NOTE — PROGRESS NOTES
"SUBJECTIVE: Patient presents today for wound care/ dressing change.   New concerns: none    OBJECTIVE / ASSESSMENT:    Site: right ankle   Top wound  APPEARANCE-redness at the base of the wound  SIZE-1 cm opening of the wound, 0.5cm depth in all directions   CONDITIONS OF MARGINS-red  SKIN AROUND WOUND-negative for redness, swelling, evidence of infection and drainage  ODOR-absent  DRAINAGE-present, YES: clear, pink tinged and bloody    Bottom wound  APPEARANCE-redness  SIZE-1.5 cm opening at the wound  CONDITIONS OF MARGINS-red  SKIN AROUND WOUND-negative for redness, swelling, evidence of infection and drainage  ODOR-absent  DRAINAGE-present, YES: clear, pink tinged and bloody      PLAN:   Top Wound  Reviewed provider orders. Wound Care-Removal of existing dressing  Visual inspection  Application of topical medication bacitracin   Wound packing with 1/4\" Iodoform packing 4cm in total (3cm inside the wound, 1cm tail)  Application of sterile dressing    Bottom Wound  Reviewed provider orders. Wound Care-Removal of existing dressing  Visual inspection  Application of topical medication bacitracin   Wound packing with 1/4\" Iodoform packing 4cm in total (3cm inside the wound, 1cm tail)  Application of sterile dressing    Discussed signs and symptoms of infection. Patient verbalized understanding. Patient will call as needed if symptoms develop. Patient will follow up with RN on Monday for next dressing change and CDL to review wound at that time.    Daniella Shaver, ERIC, BSN             "

## 2017-08-24 NOTE — MR AVS SNAPSHOT
After Visit Summary   8/24/2017    Pebbles Powell    MRN: [id number]           Patient Information     Date Of Birth          1940        Visit Information        Provider Department      8/24/2017 8:30 AM NL RN TEAM A, DAYRON [facility]        Today's Diagnoses     Encounter for wound care    -  1       Follow-ups after your visit        Your next 10 appointments already scheduled     Aug 28, 2017  8:30 AM CDT   Nurse Only with NL RN TEAM A, DAYRON   [facility] ([facility])    [address]  [facility] [id number] [phone]              Who to contact     If you have questions or need follow up information about today's clinic visit or your schedule please contact [facility] directly at [phone].  Normal or non-critical lab and imaging results will be communicated to you by Transpondhart, letter or phone within 4 business days after the clinic has received the results. If you do not hear from us within 7 days, please contact the clinic through Transpondhart or phone. If you have a critical or abnormal lab result, we will notify you by phone as soon as possible.  Submit refill requests through Status4 or call your pharmacy and they will forward the refill request to us. Please allow 3 business days for your refill to be completed.          Additional Information About Your Visit        MyChart Information     Status4 gives you secure access to your electronic health record. If you see a primary care provider, you can also send messages to your care team and make appointments. If you have questions, please call your primary care clinic.  If you do not have a primary care provider, please call [phone] and they will assist you.        Care EveryWhere ID     This is your Care EveryWhere ID. This could be used by other organizations to access your [place] medical records  [id number]         Blood Pressure from Last 3 Encounters:    08/08/17 116/62   10/05/16 120/60   04/18/16 121/57    Weight from Last 3 Encounters:   08/08/17 175 lb 9.6 oz (79.7 kg)   03/15/17 187 lb (84.8 kg)   10/05/16 187 lb (84.8 kg)              Today, you had the following     No orders found for display       Primary Care Provider Office Phone # Fax #    Janiya Yady Fraser [phone]       [address] [place] [address]        Equal Access to Services     [facility]: Hadii janet cuevas hadasho Soomaali, waaxda luqadaha, qaybta kaalmada anita, corey claudio . So [facility] [phone].    ATENCIÓN: Si habla español, tiene a caal disposición servicios gratuitos de asistencia lingüística. [facility] [phone].    We comply with applicable federal civil rights laws and [place] laws. We do not discriminate on the basis of race, color, national origin, age, disability sex, sexual orientation or gender identity.            Thank you!     Thank you for choosing [facility]  for your care. Our goal is always to provide you with excellent care. Hearing back from our patients is one way we can continue to improve our services. Please take a few minutes to complete the written survey that you may receive in the mail after your visit with us. Thank you!             Your Updated Medication List - Protect others around you: Learn how to safely use, store and throw away your medicines at www.disposemymeds.org.          This list is accurate as of: 8/24/17  8:48 AM.  Always use your most recent med list.                   Brand Name Dispense Instructions for use Diagnosis    acetaminophen 650 MG 8 hour tablet     100 tablet    Take 650 mg by mouth every 6 hours    Status post total right knee replacement       AUGMENTIN PO           doxycycline 100 MG tablet    VIBRA-TABS    20 tablet    Take 1 tablet (100 mg) by mouth 2 times daily    Cat bite of right foot, subsequent encounter       FERROUS GLUCONATE PO      Take 324 mg by  mouth every other day        furosemide 20 MG tablet    LASIX    90 tablet    TAKE ONE TABLET BY MOUTH ONCE DAILY IN THE MORNING    Chronic diastolic congestive heart failure (H)       levothyroxine 100 MCG tablet    SYNTHROID/LEVOTHROID    90 tablet    TAKE ONE TABLET BY MOUTH ONCE DAILY    Hypothyroidism       Magnesium 500 MG Caps           multivitamin per tablet      1 TABLET DAILY brand varies per pt        order for DME     1 Device    Equipment being ordered: compression stockings, med strength    Unspecified venous (peripheral) insufficiency       VITAMIN B 12 PO      Take 2,500 mcg by mouth

## 2017-08-28 ENCOUNTER — TELEPHONE (OUTPATIENT)
Dept: FAMILY MEDICINE | Facility: CLINIC | Age: 77
End: 2017-08-28

## 2017-08-28 ENCOUNTER — ALLIED HEALTH/NURSE VISIT (OUTPATIENT)
Dept: FAMILY MEDICINE | Facility: OTHER | Age: 77
End: 2017-08-28
Payer: COMMERCIAL

## 2017-08-28 DIAGNOSIS — Z51.89 VISIT FOR WOUND CARE: Primary | ICD-10-CM

## 2017-08-28 PROCEDURE — 99207 ZZC NO CHARGE NURSE ONLY: CPT

## 2017-08-28 NOTE — MR AVS SNAPSHOT
After Visit Summary   8/28/2017    Pebbles Powell    MRN: [id number]           Patient Information     Date Of Birth          1940        Visit Information        Provider Department      8/28/2017 8:30 AM NL RN TEAM A, DAYRON [facility]        Today's Diagnoses     Visit for wound care    -  1       Follow-ups after your visit        Your next 10 appointments already scheduled     Aug 30, 2017  9:00 AM CDT   Nurse Only with NL RN TEAM A, DAYRON   [facility] [place] [facility]    [address]  [facility] [id number] [phone]              Who to contact     If you have questions or need follow up information about today's clinic visit or your schedule please contact [facility] directly at [phone].  Normal or non-critical lab and imaging results will be communicated to you by Kaulihart, letter or phone within 4 business days after the clinic has received the results. If you do not hear from us within 7 days, please contact the clinic through Kaulihart or phone. If you have a critical or abnormal lab result, we will notify you by phone as soon as possible.  Submit refill requests through Bright Pattern or call your pharmacy and they will forward the refill request to us. Please allow 3 business days for your refill to be completed.          Additional Information About Your Visit        MyChart Information     Bright Pattern gives you secure access to your electronic health record. If you see a primary care provider, you can also send messages to your care team and make appointments. If you have questions, please call your primary care clinic.  If you do not have a primary care provider, please call [phone] and they will assist you.        Care EveryWhere ID     This is your Care EveryWhere ID. This could be used by other organizations to access your [place] medical records  [id number]         Blood Pressure from Last 3 Encounters:    08/08/17 116/62   10/05/16 120/60   04/18/16 121/57    Weight from Last 3 Encounters:   08/08/17 175 lb 9.6 oz (79.7 kg)   03/15/17 187 lb (84.8 kg)   10/05/16 187 lb (84.8 kg)              Today, you had the following     No orders found for display       Primary Care Provider Office Phone # Fax #    Janiya Yady Fraser [phone]       [address] [place] [address]        Equal Access to Services     [facility]: Hadii janet cuevas hadasho Soomaali, waaxda luqadaha, qaybta kaalmada anita, corey claudio . So [facility] [phone].    ATENCIÓN: Si habla español, tiene a caal disposición servicios gratuitos de asistencia lingüística. [facility] [phone].    We comply with applicable federal civil rights laws and [place] laws. We do not discriminate on the basis of race, color, national origin, age, disability sex, sexual orientation or gender identity.            Thank you!     Thank you for choosing [facility] [place] [facility]  for your care. Our goal is always to provide you with excellent care. Hearing back from our patients is one way we can continue to improve our services. Please take a few minutes to complete the written survey that you may receive in the mail after your visit with us. Thank you!             Your Updated Medication List - Protect others around you: Learn how to safely use, store and throw away your medicines at www.disposemymeds.org.          This list is accurate as of: 8/28/17  8:45 AM.  Always use your most recent med list.                   Brand Name Dispense Instructions for use Diagnosis    acetaminophen 650 MG 8 hour tablet     100 tablet    Take 650 mg by mouth every 6 hours    Status post total right knee replacement       AUGMENTIN PO           doxycycline 100 MG tablet    VIBRA-TABS    20 tablet    Take 1 tablet (100 mg) by mouth 2 times daily    Cat bite of right foot, subsequent encounter       FERROUS GLUCONATE PO      Take 324 mg by  mouth every other day        furosemide 20 MG tablet    LASIX    90 tablet    TAKE ONE TABLET BY MOUTH ONCE DAILY IN THE MORNING    Chronic diastolic congestive heart failure (H)       levothyroxine 100 MCG tablet    SYNTHROID/LEVOTHROID    90 tablet    TAKE ONE TABLET BY MOUTH ONCE DAILY    Hypothyroidism       Magnesium 500 MG Caps           multivitamin per tablet      1 TABLET DAILY brand varies per pt        order for DME     1 Device    Equipment being ordered: compression stockings, med strength    Unspecified venous (peripheral) insufficiency       VITAMIN B 12 PO      Take 2,500 mcg by mouth

## 2017-08-28 NOTE — LETTER
August 28, 2017    Pebbles Powell  [address] [place] [facility] [address]        Dear Pebbles,    This is a letter to summarize our phone call on August 28, 2017. I explained that the biopsy was consistent with a benign condition called porokeratosis. Porokeratosis can present just with a few spots and multiple areas of involvement. The significance is that this condition puts you at some increase risk of skin cancer.     Proactive recommendations :         Use sunscreen or sun protection with clothing when you are outside         New spots can be treated with freezing if the individual lesions         Recheck of the skin in 6 months.      Thank you for allowing me to be involved in your health care and for choosing [facility].  If you have any questions or concerns please feel free to contact me, [phone].      Sincerely,      Carolyn Domingo M.D.

## 2017-08-28 NOTE — NURSING NOTE
Wound location: Right medial ankle      Approximately 4 cm of Iodoform packing was removed from the top and 4 cm removed from the bottom wound.  There was scant bloody drainage on the Band-Aid. There was scant serosanguinous drainage on the packing.       Top Wound  Length .8 cm  Depth .5 cm  Width .3 cm  Approximately 3 cm of Iodoform packing was inserted (2cm inside wound and a 1 cm tail) in sterile fashion without complication. No pain was noted and patient tolerated well.      Bottom Wound  Length 1.1 cm  Depth .5 cm  Width .5 cm  Approximately 3 cm inches of Iodoform packing was inserted (2 cm inside wound and 1 cm tail) in sterile fashion without complication. No pain was noted and patient tolerated well.      CDL assessed pt's wound and recommended wound be packed one more time, reassessed on Wednesday by an RN and possibly at that point no packing after that and let heal on its own.  Pt understands and agrees to plan.      Anyi Phillips RN

## 2017-08-28 NOTE — TELEPHONE ENCOUNTER
The tissue report was consistent with porokeratosis. Discussed the increase risk of skin cancer . Because of the limited number of lesions could manage with cryotherapy but recommended  Skin check of arms and legs every 6 months.

## 2017-08-28 NOTE — PROGRESS NOTES
ADDENDUM:                                                    August 28, 2017 7:11 AM  Pathology report results:

## 2017-08-28 NOTE — TELEPHONE ENCOUNTER
annie calling for the biopsy results done on 08/18/17.    Please advise    Lisa ALY RN  [facility]  [phone]

## 2017-08-30 ENCOUNTER — ALLIED HEALTH/NURSE VISIT (OUTPATIENT)
Dept: FAMILY MEDICINE | Facility: OTHER | Age: 77
End: 2017-08-30
Payer: COMMERCIAL

## 2017-08-30 ENCOUNTER — OFFICE VISIT (OUTPATIENT)
Dept: FAMILY MEDICINE | Facility: CLINIC | Age: 77
End: 2017-08-30
Payer: COMMERCIAL

## 2017-08-30 DIAGNOSIS — Z48.00 CHANGE OR REMOVAL OF WOUND PACKING: Primary | ICD-10-CM

## 2017-08-30 DIAGNOSIS — Q82.8 POROKERATOSIS: ICD-10-CM

## 2017-08-30 PROCEDURE — 99213 OFFICE O/P EST LOW 20 MIN: CPT | Performed by: FAMILY MEDICINE

## 2017-08-30 PROCEDURE — 99207 ZZC NO CHARGE NURSE ONLY: CPT

## 2017-08-30 RX ORDER — MUPIROCIN 20 MG/G
OINTMENT TOPICAL 3 TIMES DAILY
Qty: 30 G | Refills: 0 | Status: SHIPPED | OUTPATIENT
Start: 2017-08-30 | End: 2017-09-06

## 2017-08-30 RX ORDER — CEPHALEXIN 500 MG/1
500 CAPSULE ORAL 3 TIMES DAILY
Qty: 21 CAPSULE | Refills: 0 | Status: SHIPPED | OUTPATIENT
Start: 2017-08-30 | End: 2017-09-09

## 2017-08-30 NOTE — NURSING NOTE
Wound location: Right medial ankle      Approximately 3 cm of Iodoform packing was removed from the top and 3 cm removed from the bottom wound.  There was scant bloody drainage on the Band-Aid. There was scant serosanguinous drainage on the packing.       Top Wound  Length .5 cm  Depth .4 cm  Width .2 cm  Approximately 2 cm of Iodoform packing was inserted (1cm inside wound and a 1 cm tail) in sterile fashion without complication. No pain was noted and patient tolerated well.      Bottom Wound  Length .6 cm  Depth .5 cm  Width .2cm  Approximately 2.5 cm inches of Iodoform packing was inserted (1.5 cm inside wound and 1 cm tail) in sterile fashion without complication. No pain was noted and patient tolerated well.      Pt will return on Friday, 9/1/17, to reassess wound.       Anyi Phillips RN

## 2017-08-30 NOTE — MR AVS SNAPSHOT
After Visit Summary   8/30/2017    Pebbles Powell    MRN: [id number]           Patient Information     Date Of Birth          1940        Visit Information        Provider Department      8/30/2017 9:00 AM NL RN TEAM A, DAYRON [facility]        Today's Diagnoses     Change or removal of wound packing    -  1       Follow-ups after your visit        Your next 10 appointments already scheduled     Sep 01, 2017  9:30 AM CDT   Nurse Only with NL RN TEAM A, DAYRON   [facility] ([facility])    [address]  [facility] [id number] [phone]              Who to contact     If you have questions or need follow up information about today's clinic visit or your schedule please contact [facility] directly at [phone].  Normal or non-critical lab and imaging results will be communicated to you by Meggatelhart, letter or phone within 4 business days after the clinic has received the results. If you do not hear from us within 7 days, please contact the clinic through Meggatelhart or phone. If you have a critical or abnormal lab result, we will notify you by phone as soon as possible.  Submit refill requests through UrbanIndo or call your pharmacy and they will forward the refill request to us. Please allow 3 business days for your refill to be completed.          Additional Information About Your Visit        MyChart Information     UrbanIndo gives you secure access to your electronic health record. If you see a primary care provider, you can also send messages to your care team and make appointments. If you have questions, please call your primary care clinic.  If you do not have a primary care provider, please call [phone] and they will assist you.        Care EveryWhere ID     This is your Care EveryWhere ID. This could be used by other organizations to access your [place] medical records  [id number]         Blood Pressure from Last 3  Encounters:   08/08/17 116/62   10/05/16 120/60   04/18/16 121/57    Weight from Last 3 Encounters:   08/08/17 175 lb 9.6 oz (79.7 kg)   03/15/17 187 lb (84.8 kg)   10/05/16 187 lb (84.8 kg)              Today, you had the following     No orders found for display       Primary Care Provider Office Phone # Fax #    Janiya Yady Fraser [phone]       [address] [place] [address]        Equal Access to Services     [facility]: Hadii janet cuevas hadasho Soomaali, waaxda luqadaha, qaybta kaalmada adeaziza, corey claudio . So [facility] [phone].    ATENCIÓN: Si habla español, tiene a caal disposición servicios gratuitos de asistencia lingüística. [facility] [phone].    We comply with applicable federal civil rights laws and [place] laws. We do not discriminate on the basis of race, color, national origin, age, disability sex, sexual orientation or gender identity.            Thank you!     Thank you for choosing [facility]  for your care. Our goal is always to provide you with excellent care. Hearing back from our patients is one way we can continue to improve our services. Please take a few minutes to complete the written survey that you may receive in the mail after your visit with us. Thank you!             Your Updated Medication List - Protect others around you: Learn how to safely use, store and throw away your medicines at www.disposemymeds.org.          This list is accurate as of: 8/30/17  9:47 AM.  Always use your most recent med list.                   Brand Name Dispense Instructions for use Diagnosis    acetaminophen 650 MG 8 hour tablet     100 tablet    Take 650 mg by mouth every 6 hours    Status post total right knee replacement       AUGMENTIN PO           doxycycline 100 MG tablet    VIBRA-TABS    20 tablet    Take 1 tablet (100 mg) by mouth 2 times daily    Cat bite of right foot, subsequent encounter       FERROUS GLUCONATE PO       Take 324 mg by mouth every other day        furosemide 20 MG tablet    LASIX    90 tablet    TAKE ONE TABLET BY MOUTH ONCE DAILY IN THE MORNING    Chronic diastolic congestive heart failure (H)       levothyroxine 100 MCG tablet    SYNTHROID/LEVOTHROID    90 tablet    TAKE ONE TABLET BY MOUTH ONCE DAILY    Hypothyroidism       Magnesium 500 MG Caps           multivitamin per tablet      1 TABLET DAILY brand varies per pt        order for DME     1 Device    Equipment being ordered: compression stockings, med strength    Unspecified venous (peripheral) insufficiency       VITAMIN B 12 PO      Take 2,500 mcg by mouth

## 2017-08-30 NOTE — MR AVS SNAPSHOT
After Visit Summary   8/30/2017    Pebbles Powell    MRN: [id number]           Patient Information     Date Of Birth          1940        Visit Information        Provider Department      8/30/2017 12:00 PM Carolyn Domingo MD [facility] Primary Care Skin        Today's Diagnoses     Surgical wound infection, initial encounter    -  1      Care Instructions    FUTURE APPOINTMENTS  Follow up as needed.    If any problems, contact Carolyn Domingo MD at cell: [phone]    TOPICAL ANTIBIOTIC  Apply mupirocin (Bactroban) 2% ointment three time(s) a day for 7 days to the affected areas.    ORAL ANTIBIOTIC  Take by mouth 1 capsule/tablet of cephalexin 500 mg three time(s) a day for 7 days. Make sure to finish the entire antibiotic regimen.          Follow-ups after your visit        Your next 10 appointments already scheduled     Sep 01, 2017  9:30 AM CDT   Nurse Only with NL RN TEAM A, ERC   [facility]    [address] [place] [id number] [phone]              Who to contact     If you have questions or need follow up information about today's clinic visit or your schedule please contact [facility] directly at [phone].  Normal or non-critical lab and imaging results will be communicated to you by MyChart, letter or phone within 4 business days after the clinic has received the results. If you do not hear from us within 7 days, please contact the clinic through MyChart or phone. If you have a critical or abnormal lab result, we will notify you by phone as soon as possible.  Submit refill requests through Luxury [employer] Investments or call your pharmacy and they will forward the refill request to us. Please allow 3 business days for your refill to be completed.          Additional Information About Your Visit        [facility] Information     [employer] gives you secure access to your electronic health  record. If you see a primary care provider, you can also send messages to your care team and make appointments. If you have questions, please call your primary care clinic.  If you do not have a primary care provider, please call [phone] and they will assist you.        Care EveryWhere ID     This is your Care EveryWhere ID. This could be used by other organizations to access your [place] medical records  [id number]         Blood Pressure from Last 3 Encounters:   08/08/17 116/62   10/05/16 120/60   04/18/16 121/57    Weight from Last 3 Encounters:   08/08/17 175 lb 9.6 oz (79.7 kg)   03/15/17 187 lb (84.8 kg)   10/05/16 187 lb (84.8 kg)              We Performed the Following     Wound Culture Aerobic Bacterial          Today's Medication Changes          These changes are accurate as of: 8/30/17 12:15 PM.  If you have any questions, ask your nurse or doctor.               Start taking these medicines.        Dose/Directions    cephALEXin 500 MG capsule   Commonly known as:  KEFLEX   Used for:  Surgical wound infection, initial encounter   Started by:  Carolyn Domingo MD        Dose:  500 mg   Take 1 capsule (500 mg) by mouth 3 times daily for 10 days   Quantity:  21 capsule   Refills:  0       mupirocin 2 % ointment   Commonly known as:  BACTROBAN   Used for:  Surgical wound infection, initial encounter   Started by:  Carolyn Domingo MD        Apply topically 3 times daily for 7 days   Quantity:  30 g   Refills:  0            Where to get your medicines      These medications were sent to [facility] [address] [place] [address] [facility]  [address] [place] [address]     Phone:  [phone]     cephALEXin 500 MG capsule    mupirocin 2 % ointment                Primary Care Provider Office Phone # Fax #    Janiya Fraser [phone]       [address] [place] [address]        Equal Access to Services     [facility]: Astrid mackey  Vern, gamalda lujohnadaha, qasylviata karaza howard, corey maxwellin hayaan tristapieter francescamaggy laMatteomary nemesio. So [facility] [phone].    ATENCIÓN: Si vivian woodall, tiene a caal disposición servicios gratuitos de asistencia lingüística. [facility] [phone].    We comply with applicable federal civil rights laws and [place] laws. We do not discriminate on the basis of race, color, national origin, age, disability sex, sexual orientation or gender identity.            Thank you!     Thank you for choosing [facility]  for your care. Our goal is always to provide you with excellent care. Hearing back from our patients is one way we can continue to improve our services. Please take a few minutes to complete the written survey that you may receive in the mail after your visit with us. Thank you!             Your Updated Medication List - Protect others around you: Learn how to safely use, store and throw away your medicines at www.disposemymeds.org.          This list is accurate as of: 8/30/17 12:15 PM.  Always use your most recent med list.                   Brand Name Dispense Instructions for use Diagnosis    acetaminophen 650 MG 8 hour tablet     100 tablet    Take 650 mg by mouth every 6 hours    Status post total right knee replacement       AUGMENTIN PO           cephALEXin 500 MG capsule    KEFLEX    21 capsule    Take 1 capsule (500 mg) by mouth 3 times daily for 10 days    Surgical wound infection, initial encounter       doxycycline 100 MG tablet    VIBRA-TABS    20 tablet    Take 1 tablet (100 mg) by mouth 2 times daily    Cat bite of right foot, subsequent encounter       FERROUS GLUCONATE PO      Take 324 mg by mouth every other day        furosemide 20 MG tablet    LASIX    90 tablet    TAKE ONE TABLET BY MOUTH ONCE DAILY IN THE MORNING    Chronic diastolic congestive heart failure (H)       levothyroxine 100 MCG tablet    SYNTHROID/LEVOTHROID    90 tablet    TAKE ONE TABLET BY MOUTH ONCE  DAILY    Hypothyroidism       Magnesium 500 MG Caps           multivitamin per tablet      1 TABLET DAILY brand varies per pt        mupirocin 2 % ointment    BACTROBAN    30 g    Apply topically 3 times daily for 7 days    Surgical wound infection, initial encounter       order for DME     1 Device    Equipment being ordered: compression stockings, med strength    Unspecified venous (peripheral) insufficiency       VITAMIN B 12 PO      Take 2,500 mcg by mouth

## 2017-08-30 NOTE — PROGRESS NOTES
[facility] - PRIMARY CARE SKIN    CC : Lesion(s)  SUBJECTIVE:                                                    Pebbles Powell is a 76 year old female who presents to clinic today for possible  infection at previous biopsy site(s). Initial symptoms have been localized to the right leg.    Tissue Pathology Report from 8/18/17      Separate issue two : Recall that the patient also reported a history of cat bites on the right leg. Since onset, she has taken augmentin and doxycycline. That wound has continued to be packed regularly by wound care nurse.    Personal history of skin cancer : YES - basal cell carcinoma on right lower leg (s/p excision 7/2014), actinic keratoses (previous Efudex field therapy). Porokeratosis (path confirmed 8/2017).  Family history of skin cancer : NO.    Sun Exposure History  Previous history of significant sun exposure:  Blistering sunburns : YES - when younger  Tanning beds : YES - when younger.  Sunscreen Use : YES, SPF : 30.  Sun-protective clothing use : No  Wide-brimmed hats : No  Sunglasses : Yes  Seeks shade : Yes    Occupation : (indoor).    Refer to electronic medical record (EMR) for past medical history and medications.    INTEGUMENTARY/SKIN: POSITIVE for changing lesions  ROS : 14 point review of systems was negative except the symptoms listed above in the HPI.    This document serves as a record of the services and decisions personally performed and made by Prabha Domingo MD. It was created on her behalf by Ollie Lopez, a trained medical scribe.  The creation of this document is based on the scribe's personal observations and the provider's statements to the medical scribe.  Ollie Lopez, August 30, 2017 12:03 PM      OBJECTIVE:                                                    GENERAL: healthy, alert and no distress  SKIN: Otto Skin Type - I.  Legs were examined. The dermatoscope was used to help evaluate pigmented lesions.  Skin Pertinent Findings:  Right leg : 1  cm area of surrounding erythema and induration at previous shave excision sites.    Right posterior ankle : Dressing covering wound secondary to cat bite.    Diagnostic Test Results:  none     MDM : previously discussed porokeratosis and regular skin exams      SSESSMENT:                                                      Encounter Diagnosis   Name Primary?     Surgical wound infection, initial encounter Yes         PLAN:                                                    Patient Instructions   FUTURE APPOINTMENTS  Follow up as needed if symptoms not resolving.  Follow up in 6 months for skin exam of arms and legs.    If any problems, contact Carolyn Domingo MD at cell: [phone]    TOPICAL ANTIBIOTIC  Apply mupirocin (Bactroban) 2% ointment three time(s) a day for 7 days to the affected areas.    ORAL ANTIBIOTIC  Take by mouth 1 capsule/tablet of cephalexin 500 mg three time(s) a day for 7 days. Make sure to finish the entire antibiotic regimen.        PROCEDURES:                                                    None.    TT: 20 minutes.  CT: 15 minutes.      The information in this document, created by the medical scribe for me, accurately reflects the services I personally performed and the decisions made by me. I have reviewed and approved this document for accuracy prior to leaving the patient care area.  Prabha Domingo MD August 30, 2017 11:54 AM  [facility] - PRIMARY CARE SKIN

## 2017-08-30 NOTE — PATIENT INSTRUCTIONS
FUTURE APPOINTMENTS  Follow up as needed if symptoms not resolving.  Follow up in 6 months for skin exam of arms and legs.    If any problems, contact Carolyn Domingo MD at cell: [phone]    TOPICAL ANTIBIOTIC  Apply mupirocin (Bactroban) 2% ointment three time(s) a day for 7 days to the affected areas.    ORAL ANTIBIOTIC  Take by mouth 1 capsule/tablet of cephalexin 500 mg three time(s) a day for 7 days. Make sure to finish the entire antibiotic regimen.

## 2017-08-31 ENCOUNTER — TELEPHONE (OUTPATIENT)
Dept: FAMILY MEDICINE | Facility: CLINIC | Age: 77
End: 2017-08-31

## 2017-08-31 NOTE — TELEPHONE ENCOUNTER
Reason for Call:  Other Medication    Detailed comments: Pt want to the pharmacy to  medication from Dr Domingo but the pharmacy has only ointment but not antibiotics, she needs to start taking antibiotics soon as possible. Please call [facility] in [place].    Phone Number Patient can be reached at: Home number on file [phone] (home)    Best Time: anytime    Can we leave a detailed message on this number? yes    Call taken on 8/31/2017 at 8:39 AM by Andree Pool

## 2017-08-31 NOTE — TELEPHONE ENCOUNTER
Contacted pharmacy and they note patient had just picked up this script today.  Gianna Hernandez RN - Triage  [facility]

## 2017-09-01 ENCOUNTER — ALLIED HEALTH/NURSE VISIT (OUTPATIENT)
Dept: FAMILY MEDICINE | Facility: OTHER | Age: 77
End: 2017-09-01
Payer: COMMERCIAL

## 2017-09-01 DIAGNOSIS — Z48.00 CHANGE OF DRESSING: Primary | ICD-10-CM

## 2017-09-01 PROCEDURE — 99207 ZZC NO CHARGE NURSE ONLY: CPT

## 2017-09-01 NOTE — PROGRESS NOTES
SUBJECTIVE: Patient presents today for wound care/ dressing change.   New concerns: taking antibiotics for biopsy infection - prescribed by Derm    OBJECTIVE / ASSESSMENT:    Site: right ankle  Top wound  APPEARANCE-redness at the base of the wound  SIZE-0.8 cm opening of the wound, 0.3 cm depth, width 0.2cm  CONDITIONS OF MARGINS-red  SKIN AROUND WOUND-negative for redness, swelling, evidence of infection and drainage  ODOR-absent  DRAINAGE-present, YES: clear and bloody     Bottom wound  APPEARANCE-redness  SIZE-0.9cm opening at the wound, 0.3cm depth, width 0.2cm  CONDITIONS OF MARGINS-red  SKIN AROUND WOUND-negative for redness, swelling, evidence of infection and drainage  ODOR-absent  DRAINAGE-present, YES: clear, pink tinged and bloody    PLAN:  Reviewed provider orders. Wound Care-Removal of existing dressing  Visual inspection  Removed the dead skin with a gauze pad and water   Application of topical medication bacitracin   Application of clean dressing of a bandaid over a gauze pad with the corners in the wound    Huddled with CDL regarding wound healing and appearance. No additional packing needed at this tim3.    Discussed signs and symptoms of infection. Patient verbalized understanding. Patient will call as needed if symptoms develop. Patient will follow up if new concerns arrise. No additional packing needed at this time.      Daniella Shaver, RN, BSN

## 2017-09-01 NOTE — MR AVS SNAPSHOT
After Visit Summary   9/1/2017    Pebbles Powell    MRN: [id number]           Patient Information     Date Of Birth          1940        Visit Information        Provider Department      9/1/2017 9:30 AM NL RN TEAM A, [facility] [place] [facility]        Today's Diagnoses     Change of dressing    -  1       Follow-ups after your visit        Who to contact     If you have questions or need follow up information about today's clinic visit or your schedule please contact [facility] directly at [phone].  Normal or non-critical lab and imaging results will be communicated to you by BatesHookhart, letter or phone within 4 business days after the clinic has received the results. If you do not hear from us within 7 days, please contact the clinic through Kixert or phone. If you have a critical or abnormal lab result, we will notify you by phone as soon as possible.  Submit refill requests through [employer] or call your pharmacy and they will forward the refill request to us. Please allow 3 business days for your refill to be completed.          Additional Information About Your Visit        MyChart Information     [employer] gives you secure access to your electronic health record. If you see a primary care provider, you can also send messages to your care team and make appointments. If you have questions, please call your primary care clinic.  If you do not have a primary care provider, please call [phone] and they will assist you.        Care EveryWhere ID     This is your Care EveryWhere ID. This could be used by other organizations to access your [place] medical records  [id number]         Blood Pressure from Last 3 Encounters:   08/08/17 116/62   10/05/16 120/60   04/18/16 121/57    Weight from Last 3 Encounters:   08/08/17 175 lb 9.6 oz (79.7 kg)   03/15/17 187 lb (84.8 kg)   10/05/16 187 lb (84.8 kg)              Today, you had the following     No orders found for display        Primary Care Provider Office Phone # Fax #    Janiya Fraser [phone]       [address] [place] [address]        Equal Access to Services     MICHAEL LARES : Hadii aad ku hadjessicatom Erikali, waaxda luqadaha, qaybta kaalmada anita, corey fernandeslloyd westonpieter ma shanon herr. So [facility] [phone].    ATENCIÓN: Si habla español, tiene a caal disposición servicios gratuitos de asistencia lingüística. Llame al [phone].    We comply with applicable federal civil rights laws and [place] laws. We do not discriminate on the basis of race, color, national origin, age, disability sex, sexual orientation or gender identity.            Thank you!     Thank you for choosing [facility]  for your care. Our goal is always to provide you with excellent care. Hearing back from our patients is one way we can continue to improve our services. Please take a few minutes to complete the written survey that you may receive in the mail after your visit with us. Thank you!             Your Updated Medication List - Protect others around you: Learn how to safely use, store and throw away your medicines at www.disposemymeds.org.          This list is accurate as of: 9/1/17  9:56 AM.  Always use your most recent med list.                   Brand Name Dispense Instructions for use Diagnosis    acetaminophen 650 MG 8 hour tablet     100 tablet    Take 650 mg by mouth every 6 hours    Status post total right knee replacement       AUGMENTIN PO           cephALEXin 500 MG capsule    KEFLEX    21 capsule    Take 1 capsule (500 mg) by mouth 3 times daily for 10 days    Surgical wound infection, initial encounter       doxycycline 100 MG tablet    VIBRA-TABS    20 tablet    Take 1 tablet (100 mg) by mouth 2 times daily    Cat bite of right foot, subsequent encounter       FERROUS GLUCONATE PO      Take 324 mg by mouth every other day        furosemide 20 MG tablet    LASIX    90 tablet    TAKE ONE  TABLET BY MOUTH ONCE DAILY IN THE MORNING    Chronic diastolic congestive heart failure (H)       levothyroxine 100 MCG tablet    SYNTHROID/LEVOTHROID    90 tablet    TAKE ONE TABLET BY MOUTH ONCE DAILY    Hypothyroidism       Magnesium 500 MG Caps           multivitamin per tablet      1 TABLET DAILY brand varies per pt        mupirocin 2 % ointment    BACTROBAN    30 g    Apply topically 3 times daily for 7 days    Surgical wound infection, initial encounter       order for DME     1 Device    Equipment being ordered: compression stockings, med strength    Unspecified venous (peripheral) insufficiency       VITAMIN B 12 PO      Take 2,500 mcg by mouth

## 2017-10-10 ENCOUNTER — HOSPITAL ENCOUNTER (OUTPATIENT)
Dept: LAB | Facility: CLINIC | Age: 77
Discharge: HOME | End: 2017-10-10
Attending: INTERNAL MEDICINE | Admitting: INTERNAL MEDICINE
Payer: MEDICARE

## 2017-10-10 DIAGNOSIS — N18.30 CKD (CHRONIC KIDNEY DISEASE) STAGE 3, GFR 30-59 ML/MIN (H): Primary | ICD-10-CM

## 2017-10-10 DIAGNOSIS — N18.30 CKD (CHRONIC KIDNEY DISEASE) STAGE 3, GFR 30-59 ML/MIN (H): ICD-10-CM

## 2017-10-10 DIAGNOSIS — D50.9 IRON DEFICIENCY ANEMIA, UNSPECIFIED IRON DEFICIENCY ANEMIA TYPE: ICD-10-CM

## 2017-10-10 LAB
ALBUMIN SERPL-MCNC: 3 G/DL (ref 3.4–5)
ANION GAP SERPL CALCULATED.3IONS-SCNC: 7 MMOL/L (ref 3–14)
BUN SERPL-MCNC: 31 MG/DL (ref 7–30)
CALCIUM SERPL-MCNC: 8.5 MG/DL (ref 8.5–10.1)
CHLORIDE SERPL-SCNC: 109 MMOL/L (ref 94–109)
CO2 SERPL-SCNC: 26 MMOL/L (ref 20–32)
CREAT UR-MCNC: 54 MG/DL
HGB BLD-MCNC: 9.7 G/DL (ref 11.7–15.7)
MICROALBUMIN UR-MCNC: 116 MG/L
MICROALBUMIN/CREAT UR: 213.24 MG/G CR (ref 0–25)
PHOSPHATE SERPL-MCNC: 3.9 MG/DL (ref 2.5–4.5)
POTASSIUM SERPL-SCNC: 4.6 MMOL/L (ref 3.4–5.3)
PROT UR-MCNC: 0.3 G/L
PROT/CREAT 24H UR: 0.54 G/G CR (ref 0–0.2)
PTH-INTACT SERPL-MCNC: 88 PG/ML (ref 12–72)
SODIUM SERPL-SCNC: 142 MMOL/L (ref 133–144)

## 2017-10-10 PROCEDURE — 82728 ASSAY OF FERRITIN: CPT | Performed by: INTERNAL MEDICINE

## 2017-10-10 PROCEDURE — 82040 ASSAY OF SERUM ALBUMIN: CPT | Performed by: INTERNAL MEDICINE

## 2017-10-10 PROCEDURE — 85018 HEMOGLOBIN: CPT | Performed by: INTERNAL MEDICINE

## 2017-10-10 PROCEDURE — 83540 ASSAY OF IRON: CPT | Performed by: INTERNAL MEDICINE

## 2017-10-10 PROCEDURE — 82043 UR ALBUMIN QUANTITATIVE: CPT | Performed by: INTERNAL MEDICINE

## 2017-10-10 PROCEDURE — 36415 COLL VENOUS BLD VENIPUNCTURE: CPT | Performed by: INTERNAL MEDICINE

## 2017-10-10 PROCEDURE — 82565 ASSAY OF CREATININE: CPT | Performed by: INTERNAL MEDICINE

## 2017-10-10 PROCEDURE — 83550 IRON BINDING TEST: CPT | Performed by: INTERNAL MEDICINE

## 2017-10-10 PROCEDURE — 82310 ASSAY OF CALCIUM: CPT | Performed by: INTERNAL MEDICINE

## 2017-10-10 PROCEDURE — 83970 ASSAY OF PARATHORMONE: CPT | Performed by: INTERNAL MEDICINE

## 2017-10-10 PROCEDURE — 80051 ELECTROLYTE PANEL: CPT | Performed by: INTERNAL MEDICINE

## 2017-10-10 PROCEDURE — 84156 ASSAY OF PROTEIN URINE: CPT | Performed by: INTERNAL MEDICINE

## 2017-10-10 PROCEDURE — 82306 VITAMIN D 25 HYDROXY: CPT | Performed by: INTERNAL MEDICINE

## 2017-10-10 PROCEDURE — 84100 ASSAY OF PHOSPHORUS: CPT | Performed by: INTERNAL MEDICINE

## 2017-10-10 PROCEDURE — 84520 ASSAY OF UREA NITROGEN: CPT | Performed by: INTERNAL MEDICINE

## 2017-10-11 LAB
CREAT SERPL-MCNC: 1.63 MG/DL (ref 0.52–1.04)
FERRITIN SERPL-MCNC: 84 NG/ML (ref 8–252)
GFR SERPL CREATININE-BSD FRML MDRD: 31 ML/MIN/1.7M2
IRON SATN MFR SERPL: 15 % (ref 15–46)
IRON SERPL-MCNC: 38 UG/DL (ref 35–180)
TIBC SERPL-MCNC: 261 UG/DL (ref 240–430)

## 2017-10-14 LAB
DEPRECATED CALCIDIOL+CALCIFEROL SERPL-MC: <65 UG/L (ref 20–75)
VITAMIN D2 SERPL-MCNC: <5 UG/L
VITAMIN D3 SERPL-MCNC: 60 UG/L

## 2017-10-17 ENCOUNTER — TRANSFERRED RECORDS (OUTPATIENT)
Dept: HEALTH INFORMATION MANAGEMENT | Facility: CLINIC | Age: 77
End: 2017-10-17

## 2017-10-23 PROBLEM — N18.9 ANEMIA SECONDARY TO RENAL FAILURE: Status: ACTIVE | Noted: 2017-10-23

## 2017-10-23 PROBLEM — D63.1 ANEMIA SECONDARY TO RENAL FAILURE: Status: ACTIVE | Noted: 2017-10-23

## 2017-10-26 ENCOUNTER — MYC MEDICAL ADVICE (OUTPATIENT)
Dept: FAMILY MEDICINE | Facility: OTHER | Age: 77
End: 2017-10-26

## 2017-10-26 ENCOUNTER — INFUSION THERAPY VISIT (OUTPATIENT)
Dept: INFUSION THERAPY | Facility: CLINIC | Age: 77
End: 2017-10-26
Attending: FAMILY MEDICINE
Payer: MEDICARE

## 2017-10-26 VITALS
TEMPERATURE: 98.4 F | RESPIRATION RATE: 18 BRPM | SYSTOLIC BLOOD PRESSURE: 122 MMHG | DIASTOLIC BLOOD PRESSURE: 49 MMHG | HEART RATE: 65 BPM | WEIGHT: 170.9 LBS | BODY MASS INDEX: 24.7 KG/M2 | OXYGEN SATURATION: 99 %

## 2017-10-26 DIAGNOSIS — N18.9 ANEMIA SECONDARY TO RENAL FAILURE: Primary | ICD-10-CM

## 2017-10-26 DIAGNOSIS — N18.30 CKD (CHRONIC KIDNEY DISEASE) STAGE 3, GFR 30-59 ML/MIN (H): ICD-10-CM

## 2017-10-26 DIAGNOSIS — D63.1 ANEMIA SECONDARY TO RENAL FAILURE: Primary | ICD-10-CM

## 2017-10-26 PROCEDURE — 96366 THER/PROPH/DIAG IV INF ADDON: CPT

## 2017-10-26 PROCEDURE — 96365 THER/PROPH/DIAG IV INF INIT: CPT

## 2017-10-26 PROCEDURE — 25000128 H RX IP 250 OP 636: Performed by: INTERNAL MEDICINE

## 2017-10-26 RX ADMIN — SODIUM CHLORIDE 250 ML: 9 INJECTION, SOLUTION INTRAVENOUS at 10:37

## 2017-10-26 RX ADMIN — FERUMOXYTOL 510 MG: 510 INJECTION INTRAVENOUS at 10:42

## 2017-10-26 ASSESSMENT — PAIN SCALES - GENERAL: PAINLEVEL: NO PAIN (0)

## 2017-10-26 NOTE — PROGRESS NOTES
Patient here for 1st dose  Feraheme today. Tolerated well, observed for 15 minutes after. VSS. Discharged in stable condition. Return in 1 week on 11/2/17.

## 2017-10-26 NOTE — MR AVS SNAPSHOT
After Visit Summary   10/26/2017    Pebbles Powell    MRN: [id number]           Patient Information     Date Of Birth          1940        Visit Information        Provider Department      10/26/2017 10:30 AM NL INFUSION CHAIR 5 [facility] Infusion Services        Today's Diagnoses     Anemia secondary to renal failure    -  1    CKD (chronic kidney disease) stage 3, GFR 30-59 ml/min           Follow-ups after your visit        Follow-up notes from your care team     Return in about 7 days (around 11/2/2017).      Your next 10 appointments already scheduled     Nov 02, 2017 11:30 AM CDT   Level 1 with NL INFUSION CHAIR 3   [facility]fusion [facility] ([facility])    [address] [place] [address] [phone]              Who to contact     If you have questions or need follow up information about today's clinic visit or your schedule please contact [facility] INFUSION SERVICES directly at [phone].  Normal or non-critical lab and imaging results will be communicated to you by Akanoohart, letter or phone within 4 business days after the clinic has received the results. If you do not hear from us within 7 days, please contact the clinic through Red Zebrat or phone. If you have a critical or abnormal lab result, we will notify you by phone as soon as possible.  Submit refill requests through [employer] or call your pharmacy and they will forward the refill request to us. Please allow 3 business days for your refill to be completed.          Additional Information About Your Visit        MyChart Information     [employer] gives you secure access to your electronic health record. If you see a primary care provider, you can also send messages to your care team and make appointments. If you have questions, please call your primary care clinic.  If you do not have a primary care provider, please call [phone] and they will assist you.        Care  EveryWhere ID     This is your Care EveryWhere ID. This could be used by other organizations to access your [place] medical records  [id number]        Your Vitals Were     Pulse Temperature Respirations Pulse Oximetry BMI (Body Mass Index)       65 98.4  F (36.9  C) (Temporal) 18 99% 24.7 kg/m2        Blood Pressure from Last 3 Encounters:   10/26/17 122/49   08/08/17 116/62   10/05/16 120/60    Weight from Last 3 Encounters:   10/26/17 77.5 kg (170 lb 14.4 oz)   08/08/17 79.7 kg (175 lb 9.6 oz)   03/15/17 84.8 kg (187 lb)              Today, you had the following     No orders found for display       Primary Care Provider Office Phone # Fax #    Janiya Fraser [phone]       [address] [place] [address]        Equal Access to Services     [facility] Center[facility] : Hadii janet cuevas hadasho Somorali, waaxda luqadaha, qaybta kaalmada adeegyada, corey claudio . So [facility] [phone].    ATENCIÓN: Si habla español, tiene a caal disposición servicios gratuitos de asistencia lingüística. Festus al [phone].    We comply with applicable federal civil rights laws and [place] laws. We do not discriminate on the basis of race, color, national origin, age, disability, sex, sexual orientation, or gender identity.            Thank you!     Thank you for choosing [facility] INFUSION SERVICES  for your care. Our goal is always to provide you with excellent care. Hearing back from our patients is one way we can continue to improve our services. Please take a few minutes to complete the written survey that you may receive in the mail after your visit with us. Thank you!             Your Updated Medication List - Protect others around you: Learn how to safely use, store and throw away your medicines at www.disposemymeds.org.          This list is accurate as of: 10/26/17 12:04 PM.  Always use your most recent med list.                   Brand Name Dispense Instructions for use  Diagnosis    acetaminophen 650 MG 8 hour tablet     100 tablet    Take 650 mg by mouth every 6 hours    Status post total right knee replacement       AUGMENTIN PO           doxycycline 100 MG tablet    VIBRA-TABS    20 tablet    Take 1 tablet (100 mg) by mouth 2 times daily    Cat bite of right foot, subsequent encounter       FERROUS GLUCONATE PO      Take 324 mg by mouth every other day        furosemide 20 MG tablet    LASIX    90 tablet    TAKE ONE TABLET BY MOUTH ONCE DAILY IN THE MORNING    Chronic diastolic congestive heart failure (H)       levothyroxine 100 MCG tablet    SYNTHROID/LEVOTHROID    90 tablet    TAKE ONE TABLET BY MOUTH ONCE DAILY    Hypothyroidism       Magnesium 500 MG Caps           multivitamin per tablet      1 TABLET DAILY brand varies per pt        order for DME     1 Device    Equipment being ordered: compression stockings, med strength    Unspecified venous (peripheral) insufficiency       VITAMIN B 12 PO      Take 2,500 mcg by mouth

## 2017-11-02 ENCOUNTER — INFUSION THERAPY VISIT (OUTPATIENT)
Dept: INFUSION THERAPY | Facility: CLINIC | Age: 77
End: 2017-11-02
Attending: FAMILY MEDICINE
Payer: MEDICARE

## 2017-11-02 VITALS
SYSTOLIC BLOOD PRESSURE: 121 MMHG | DIASTOLIC BLOOD PRESSURE: 57 MMHG | TEMPERATURE: 97.8 F | RESPIRATION RATE: 18 BRPM | OXYGEN SATURATION: 98 % | HEART RATE: 69 BPM

## 2017-11-02 DIAGNOSIS — N18.30 CKD (CHRONIC KIDNEY DISEASE) STAGE 3, GFR 30-59 ML/MIN (H): ICD-10-CM

## 2017-11-02 DIAGNOSIS — D63.1 ANEMIA SECONDARY TO RENAL FAILURE: Primary | ICD-10-CM

## 2017-11-02 DIAGNOSIS — N18.9 ANEMIA SECONDARY TO RENAL FAILURE: Primary | ICD-10-CM

## 2017-11-02 PROCEDURE — 96365 THER/PROPH/DIAG IV INF INIT: CPT

## 2017-11-02 PROCEDURE — 25000128 H RX IP 250 OP 636: Performed by: INTERNAL MEDICINE

## 2017-11-02 RX ADMIN — SODIUM CHLORIDE 250 ML: 9 INJECTION, SOLUTION INTRAVENOUS at 12:07

## 2017-11-02 RX ADMIN — FERUMOXYTOL 510 MG: 510 INJECTION INTRAVENOUS at 12:11

## 2017-11-02 ASSESSMENT — PAIN SCALES - GENERAL: PAINLEVEL: NO PAIN (0)

## 2017-11-02 NOTE — PROGRESS NOTES
Patient here for 2nd Feraheme infusion and tolerated well. Observed for 15 minutes after. Discharged in stable condition.

## 2017-11-02 NOTE — MR AVS SNAPSHOT
After Visit Summary   11/2/2017    Pebbles Powell    MRN: [id number]           Patient Information     Date Of Birth          1940        Visit Information        Provider Department      11/2/2017 11:30 AM NL INFUSION CHAIR 3 [facility] Infusion Services        Today's Diagnoses     Anemia secondary to renal failure    -  1    CKD (chronic kidney disease) stage 3, GFR 30-59 ml/min           Follow-ups after your visit        Who to contact     If you have questions or need follow up information about today's clinic visit or your schedule please contact [facility] INFUSION SERVICES directly at [phone].  Normal or non-critical lab and imaging results will be communicated to you by Sophie & Juliethart, letter or phone within 4 business days after the clinic has received the results. If you do not hear from us within 7 days, please contact the clinic through [employer] or phone. If you have a critical or abnormal lab result, we will notify you by phone as soon as possible.  Submit refill requests through [employer] or call your pharmacy and they will forward the refill request to us. Please allow 3 business days for your refill to be completed.          Additional Information About Your Visit        MyChart Information     [employer] gives you secure access to your electronic health record. If you see a primary care provider, you can also send messages to your care team and make appointments. If you have questions, please call your primary care clinic.  If you do not have a primary care provider, please call [phone] and they will assist you.        Care EveryWhere ID     This is your Care EveryWhere ID. This could be used by other organizations to access your [place] medical records  [id number]        Your Vitals Were     Pulse Temperature Respirations Pulse Oximetry          69 97.8  F (36.6  C) (Temporal) 18 98%         Blood Pressure from Last 3 Encounters:   11/02/17 121/57   10/26/17  122/49   08/08/17 116/62    Weight from Last 3 Encounters:   10/26/17 77.5 kg (170 lb 14.4 oz)   08/08/17 79.7 kg (175 lb 9.6 oz)   03/15/17 84.8 kg (187 lb)              Today, you had the following     No orders found for display       Primary Care Provider Office Phone # Fax #    Janiya Yady Fraser [phone]       [address] [place] [address]        Equal Access to Services     MICHAEL LARES : Hadii aad ku hadasho Soomaali, waaxda luqadaha, qaybta kaalmada adeegyada, waxestee crawley haymary claudio . So [facility] [phone].    ATENCIÓN: Si habla español, tiene a caal disposición servicios gratuitos de asistencia lingüística. LlSelect [facility]ty [facility] [phone].    We comply with applicable federal civil rights laws and [place] laws. We do not discriminate on the basis of race, color, national origin, age, disability, sex, sexual orientation, or gender identity.            Thank you!     Thank you for choosing [facility] INFUSION SERVICES  for your care. Our goal is always to provide you with excellent care. Hearing back from our patients is one way we can continue to improve our services. Please take a few minutes to complete the written survey that you may receive in the mail after your visit with us. Thank you!             Your Updated Medication List - Protect others around you: Learn how to safely use, store and throw away your medicines at www.disposemymeds.org.          This list is accurate as of: 11/2/17  3:42 PM.  Always use your most recent med list.                   Brand Name Dispense Instructions for use Diagnosis    acetaminophen 650 MG 8 hour tablet     100 tablet    Take 650 mg by mouth every 6 hours    Status post total right knee replacement       AUGMENTIN PO           doxycycline 100 MG tablet    VIBRA-TABS    20 tablet    Take 1 tablet (100 mg) by mouth 2 times daily    Cat bite of right foot, subsequent encounter       FERROUS GLUCONATE PO      Take 324 mg by mouth  every other day        furosemide 20 MG tablet    LASIX    90 tablet    TAKE ONE TABLET BY MOUTH ONCE DAILY IN THE MORNING    Chronic diastolic congestive heart failure (H)       levothyroxine 100 MCG tablet    SYNTHROID/LEVOTHROID    90 tablet    TAKE ONE TABLET BY MOUTH ONCE DAILY    Hypothyroidism       Magnesium 500 MG Caps           multivitamin per tablet      1 TABLET DAILY brand varies per pt        order for DME     1 Device    Equipment being ordered: compression stockings, med strength    Unspecified venous (peripheral) insufficiency       VITAMIN B 12 PO      Take 2,500 mcg by mouth

## 2017-11-07 ENCOUNTER — TELEPHONE (OUTPATIENT)
Dept: LAB | Facility: OTHER | Age: 77
End: 2017-11-07

## 2017-11-07 NOTE — TELEPHONE ENCOUNTER
It doesn't look like she has any due or due soon, recently had labs completed.  Notes mention a fecal test from Dr. Manzo.  Could confirm with lab that they have this order.  Janiya Fraser MD

## 2017-11-07 NOTE — TELEPHONE ENCOUNTER
LM for patient to return phone call to clinic about message below.  Patient should contact Dr. Manzo's office to place order for stool studies that are needed for lab appointment on 11/10/17.  Zoe Davis CMA ([facility])

## 2017-11-07 NOTE — TELEPHONE ENCOUNTER
Please enter future lab orders as needed for upcoming lab appointment,    Thank you    Rosa Lindquist

## 2017-11-10 DIAGNOSIS — N18.30 CHRONIC KIDNEY DISEASE, STAGE III (MODERATE) (H): Primary | ICD-10-CM

## 2017-11-10 LAB
ANION GAP SERPL CALCULATED.3IONS-SCNC: 5 MMOL/L (ref 3–14)
BUN SERPL-MCNC: 38 MG/DL (ref 7–30)
CHLORIDE SERPL-SCNC: 110 MMOL/L (ref 94–109)
CO2 SERPL-SCNC: 27 MMOL/L (ref 20–32)
CREAT SERPL-MCNC: 1.55 MG/DL (ref 0.52–1.04)
CREAT UR-MCNC: 39 MG/DL
GFR SERPL CREATININE-BSD FRML MDRD: 33 ML/MIN/1.7M2
HGB BLD-MCNC: 10.3 G/DL (ref 11.7–15.7)
MICROALBUMIN UR-MCNC: 6 MG/L
MICROALBUMIN/CREAT UR: 16.45 MG/G CR (ref 0–25)
POTASSIUM SERPL-SCNC: 4.2 MMOL/L (ref 3.4–5.3)
PROT UR-MCNC: 0.05 G/L
PROT/CREAT 24H UR: 0.14 G/G CR (ref 0–0.2)
SODIUM SERPL-SCNC: 142 MMOL/L (ref 133–144)

## 2017-11-10 PROCEDURE — 00000402 ZZHCL STATISTIC TOTAL PROTEIN: Performed by: INTERNAL MEDICINE

## 2017-11-10 PROCEDURE — 36415 COLL VENOUS BLD VENIPUNCTURE: CPT | Performed by: INTERNAL MEDICINE

## 2017-11-10 PROCEDURE — 84520 ASSAY OF UREA NITROGEN: CPT | Performed by: INTERNAL MEDICINE

## 2017-11-10 PROCEDURE — 80051 ELECTROLYTE PANEL: CPT | Performed by: INTERNAL MEDICINE

## 2017-11-10 PROCEDURE — 84165 PROTEIN E-PHORESIS SERUM: CPT | Performed by: INTERNAL MEDICINE

## 2017-11-10 PROCEDURE — 85018 HEMOGLOBIN: CPT | Performed by: INTERNAL MEDICINE

## 2017-11-10 PROCEDURE — 82565 ASSAY OF CREATININE: CPT | Performed by: INTERNAL MEDICINE

## 2017-11-10 PROCEDURE — 82043 UR ALBUMIN QUANTITATIVE: CPT | Performed by: INTERNAL MEDICINE

## 2017-11-10 PROCEDURE — 84156 ASSAY OF PROTEIN URINE: CPT | Performed by: INTERNAL MEDICINE

## 2017-11-13 LAB
ALBUMIN SERPL ELPH-MCNC: 3.6 G/DL (ref 3.7–5.1)
ALPHA1 GLOB SERPL ELPH-MCNC: 0.4 G/DL (ref 0.2–0.4)
ALPHA2 GLOB SERPL ELPH-MCNC: 0.8 G/DL (ref 0.5–0.9)
B-GLOBULIN SERPL ELPH-MCNC: 0.6 G/DL (ref 0.6–1)
GAMMA GLOB SERPL ELPH-MCNC: 1.1 G/DL (ref 0.7–1.6)
M PROTEIN SERPL ELPH-MCNC: 0 G/DL
PROT PATTERN SERPL ELPH-IMP: ABNORMAL

## 2017-11-15 ENCOUNTER — TELEPHONE (OUTPATIENT)
Dept: FAMILY MEDICINE | Facility: OTHER | Age: 77
End: 2017-11-15

## 2017-11-15 DIAGNOSIS — D64.9 ANEMIA, UNSPECIFIED TYPE: Primary | ICD-10-CM

## 2017-11-15 NOTE — TELEPHONE ENCOUNTER
Spoke with  nurse and they want  to order stool studies for Anemia work up. Please advise. nurse would like a call back from  nurse either way to let them know if  is ordering.  Zenaida Richard CMA

## 2017-11-15 NOTE — TELEPHONE ENCOUNTER
Dr Urena nurse would like to speak to someone from Dr Ayers team.   She said at your convience and it was not a emergency.   She said her direct dial number is [phone]  I am sorry, no other information would be given

## 2017-11-16 NOTE — TELEPHONE ENCOUNTER
Looks like she was lower last week with iron studies and ferritin normal.  This is unlikely to be from blood loss.  Will order the blood stool testing -- done.  Please also call to see if the Hg can be changed to cbc to see if the indices can give us a clue about cause of the anemia. Not sure if they still have the blood from last week.  (if they do not, I would not have her redrawn until later as likely will repeat other labs as well).  Janiya Fraser MD

## 2017-11-16 NOTE — TELEPHONE ENCOUNTER
's team was informed of the message below. The blood is too old to add a CBC on.   Anny Sales MA  November 16, 2017

## 2017-11-23 PROCEDURE — 82274 ASSAY TEST FOR BLOOD FECAL: CPT | Performed by: FAMILY MEDICINE

## 2017-11-24 DIAGNOSIS — D64.9 ANEMIA, UNSPECIFIED TYPE: ICD-10-CM

## 2017-11-25 LAB — HEMOCCULT STL QL IA: NEGATIVE

## 2017-11-26 NOTE — PROGRESS NOTES
Pebbles, your results were all normal.  There was no blood detected in your stool.  Please let me know if you have any questions.    Janiya Fraser MD

## 2017-12-06 NOTE — PROGRESS NOTES
SUBJECTIVE:                                                    Pebbles Powell is a 77 year old female who presents to clinic today for the following health issues:  HPI    Acute Illness   Acute illness concerns: head cold  Onset: 2 weeks    Fever: no    Chills/Sweats: no    Headache (location?): no    Sinus Pressure:YES    Conjunctivitis:  no    Ear Pain: no    Rhinorrhea: YES    Congestion: YES    Sore Throat: no      Cough: YES-non-productive    Wheeze: no    Decreased Appetite: Yes    Nausea: no    Vomiting: no    Diarrhea:  no    Dysuria/Freq.: no    Fatigue/Achiness: YES    Sick/Strep Exposure: no     Therapies Tried and outcome: coricidin         Problem list and histories reviewed & adjusted, as indicated.  Additional history: as documented    ROS:  Constitutional, HEENT, cardiovascular, pulmonary, gi and gu systems are negative, except as otherwise noted.      OBJECTIVE:   /62 (BP Location: Left arm, Patient Position: Chair, Cuff Size: Adult Regular)  Pulse 92  Temp 97.5  F (36.4  C) (Oral)  Resp 12  Wt 167 lb (75.8 kg)  SpO2 97%  BMI 24.13 kg/m2  Body mass index is 24.13 kg/(m^2).  GENERAL APPEARANCE: mildly ill appearing, alert and no distress  EYES: Eyes grossly normal to inspection, PERRLA, conjunctivae and sclerae without injection or discharge, EOM intact   HENT: Bilateral ear canals without erythema or cerumen, bilateral TM's pearly grey with normal light reflex, bilateral clear serous effusion with no injection or bulging, nasal turbinates with severe swelling & erythema, yellow-green nasal discharge, mouth without ulcers or lesions, oropharynx mildly erythematous without edema or exudate, post nasal drip present, oral mucous membranes moist, bilateral mild-moderate frontal and maxillary sinus tenderness   NECK: Bilateral anterior cervical adenopathy, no adenopathy in posterior cervical or supraclavicular regions  RESP: Lungs clear to auscultation - no rales, rhonchi or wheezes   CV:  Regular rates and rhythm, normal S1 S2, no S3 or S4, no murmur, click or rub  MS: No musculoskeletal defects are noted and gait is age appropriate without ataxia   SKIN: No suspicious lesions or rashes, hydration status appears adeuqate with normal skin turgor   PSYCH: Alert and oriented x3; speech- coherent , normal rate and volume; able to articulate logical thoughts, able to abstract reason, no tangential thoughts, no hallucinations or delusions, mentation appears normal, Mood is euthymic. Affect is appropriate for this mood state and bright. Thought content is free of suicidal ideation, hallucinations, and delusions. Dress is adequate and upkept. Eye contact is good during conversation.       Diagnostic Test Results:  none     ASSESSMENT/PLAN:       ICD-10-CM    1. Acute sinusitis with symptoms > 10 days J01.90 amoxicillin-clavulanate (AUGMENTIN) 875-125 MG per tablet   2. CKD (chronic kidney disease) stage 3, GFR 30-59 ml/min N18.3      - Due to length of symptoms and exam findings, recommend antibiotic therapy   - Discussed antibiotic use, duration, and side effects  - Had taken Augmentin in the past   - History of CKD - 77 y.o. Female, weight 167 lbs., last cr 1.55, GFR 33          Calculated CrCl 37.55           Per UTD, no dosing adjustment needed   - Advised rest and fluids   - Hand out given     The patient indicates understanding of these issues and agrees with the plan.    Follow up: ENRIKE Jameson PA-C  [facility]

## 2017-12-07 ENCOUNTER — OFFICE VISIT (OUTPATIENT)
Dept: FAMILY MEDICINE | Facility: OTHER | Age: 77
End: 2017-12-07
Payer: COMMERCIAL

## 2017-12-07 VITALS
HEART RATE: 92 BPM | DIASTOLIC BLOOD PRESSURE: 62 MMHG | OXYGEN SATURATION: 97 % | WEIGHT: 167 LBS | BODY MASS INDEX: 24.13 KG/M2 | TEMPERATURE: 97.5 F | RESPIRATION RATE: 12 BRPM | SYSTOLIC BLOOD PRESSURE: 110 MMHG

## 2017-12-07 DIAGNOSIS — N18.30 CKD (CHRONIC KIDNEY DISEASE) STAGE 3, GFR 30-59 ML/MIN (H): ICD-10-CM

## 2017-12-07 DIAGNOSIS — J01.90 ACUTE SINUSITIS WITH SYMPTOMS > 10 DAYS: Primary | ICD-10-CM

## 2017-12-07 PROCEDURE — 99213 OFFICE O/P EST LOW 20 MIN: CPT | Performed by: PHYSICIAN ASSISTANT

## 2017-12-07 ASSESSMENT — PATIENT HEALTH QUESTIONNAIRE - PHQ9: SUM OF ALL RESPONSES TO PHQ QUESTIONS 1-9: 2

## 2017-12-07 NOTE — MR AVS SNAPSHOT
After Visit Summary   12/7/2017    Pebbles Powell    MRN: [id number]           Patient Information     Date Of Birth          1940        Visit Information        Provider Department      12/7/2017 1:45 PM Taylor Jameson PA-C [facility]        Today's Diagnoses     Acute sinusitis with symptoms > 10 days    -  1      Care Instructions                  Sinusitis           What is sinusitis?   Sinusitis is swollen, infected linings of the sinuses. The sinuses are hollow spaces in the bones of your face and skull. They connect with the nose through small openings. Like the nose, their linings make mucus.   How does it occur?   Sinusitis occurs when the sinus linings become infected. The passageways from the sinuses to the nose are very narrow. Swelling and mucus may block the passageways. This leads to pressure changes in the sinuses that can be painful.   A number of things can cause swelling and sinusitis. Most often it's allergens (things that cause allergies, like pollen and mold) and viruses, such as viruses that cause the common cold. Whether the cause is allergies or a virus, the sinus linings can swell. When swelling causes the sinus passageway to swell shut, bacteria, viruses, and even fungus can be trapped in the sinuses and cause a sinus infection.   If your nasal bones have been injured or are deformed, causing partial blockage of the sinus openings, you are more likely to get sinusitis.   What are the symptoms?   Symptoms include:   feeling of fullness or pressure in your head   a headache that is most painful when you first wake up in the morning or when you bend your head down or forward   pain above or below your eyes   aching in the upper jaw and teeth   runny or stuffy nose   cough, especially at night   fluid draining down the back of your throat (postnasal drainage)   sore throat in the morning or evening.   How is it diagnosed?   Your healthcare  provider will ask about your symptoms and will examine you. You may have an X-ray to look for swelling, fluid, or small benign growths (polyps) in the sinuses.   How is it treated?   Decongestants may help. They may be nonprescription or prescription. They are available as liquids, pills, and nose sprays.   Your healthcare provider may prescribe an antibiotic. In some cases you may need to take decongestants and antibiotics for several weeks.   You may need nonprescription medicine for pain, such as acetaminophen or ibuprofen. Check with your healthcare provider before you give any medicine that contains aspirin or salicylates to a child or teen. This includes medicines like baby aspirin, some cold medicines, and Pepto Bismol. Children and teens who take aspirin are at risk for a serious illness called Reye's syndrome. Ibuprofen is an NSAID. Nonsteroidal anti-inflammatory medicines (NSAIDs) may cause stomach bleeding and other problems. These risks increase with age. Read the label and take as directed. Unless recommended by your healthcare provider, do not take NSAIDs for more than 10 days for any reason.   If you have chronic or repeated sinus infections, allergies may be the cause. Your healthcare provider may prescribe antihistamine tablets or prescription nasal sprays (steroids or cromolyn) to treat the allergies.   If you have chronic, severe sinusitis that does not respond to treatment with medicines, surgery may be done. The surgeon can create an extra or enlarged passageway in the wall of the sinus cavity. This allows the sinuses to drain more easily through the nasal passages. This should help them stay free of infection.   How long will the effects last?   Symptoms may get better gradually over 3 to 10 days. Depending on what caused the sinusitis and how severe it is, it may last for days or weeks. The symptoms may come back if you do not finish all of your antibiotic.   How can I take care of myself?    Follow your healthcare provider's instructions.   If you are taking an antibiotic, take all of it as directed by your provider. If you stop taking the medicine when your symptoms are gone but before you have taken all of the medicine, symptoms may come back.   Avoid tobacco smoke.   If you have allergies, take care to avoid the things you are allergic to, such as animal dander.   Add moisture to the air with a humidifier or a vaporizer, unless you have mold allergy (mold may grow in your vaporizer).   Inhale steam from a basin of hot water or shower to help open your sinuses and relieve pain.   Use saline nasal sprays to help wash out nasal passages and clear some mucus from the airways.   Use decongestants as directed on the label or by your provider.   If you are using a nonprescription nasal-spray decongestant, generally you should not use it for more than 3 days. After 3 days it may cause your symptoms to get worse. Ask your healthcare provider if it is OK for you to use a nasal spray decongestant longer than this.   Get plenty of rest.   Drink more fluids to keep the mucus as thin as possible so your sinuses can drain more easily.   Put warm compresses on painful areas.   Take antibiotics as prescribed. Use all of the medicine, even after you feel better. Some sinus infections require 2 to 4 weeks of antibiotic treatment.   See your healthcare provider if the pain lasts for several days or gets worse.   If the sinus areas above or below your eyes are swollen or bulging, see your healthcare provider right away. This symptom may mean that the infection is spreading. A spreading infection can affect other parts of your body--even the brain--and needs to be treated promptly.   How can I help prevent sinusitis?   Treat your colds and allergies promptly. Use decongestants as soon as you start having symptoms.   Do not smoke and stay away from secondhand smoke.   Drink lots of fluids to keep the mucus thin.    Humidify your home if the air is particularly dry.   If you have sinus infections often, consider having allergy tests.   If sinusitis continues to be a problem despite treatment, you might need an exam by an ear, nose, and throat doctor (called an ENT or otolaryngologist). The specialist will check for polyps or a deformed bone that may be blocking your sinuses.     Published by [employer].  This content is reviewed periodically and is subject to change as new health information becomes available. The information is intended to inform and educate and is not a replacement for medical evaluation, advice, diagnosis or treatment by a healthcare professional.   Developed by [employer].   ? 2010 [employer] and/or its affiliates. All Rights Reserved.   Copyright   Clinical Reference Systems 2011  Adult Health Advisor                Follow-ups after your visit        Who to contact     If you have questions or need follow up information about today's clinic visit or your schedule please contact [facility] at [phone].  Normal or non-critical lab and imaging results will be communicated to you by "[employer]hart, letter or phone within 4 business days after the clinic has received the results. If you do not hear from us within 7 days, please contact the clinic through [employer] or phone. If you have a critical or abnormal lab result, we will notify you by phone as soon as possible.  Submit refill requests through [employer] or call your pharmacy and they will forward the refill request to us. Please allow 3 business days for your refill to be completed.          Additional Information About Your Visit        [employer] Information     [employer] gives you secure access to your electronic health record. If you see a primary care provider, you can also send messages to your care team and make appointments. If you have questions, please call your primary care clinic.  If you do not have a primary care provider,  please call [phone] and they will assist you.        Care EveryWhere ID     This is your Care EveryWhere ID. This could be used by other organizations to access your [facility] medical records  [id number]        Your Vitals Were     Pulse Temperature Respirations Pulse Oximetry BMI (Body Mass Index)       92 97.5  F (36.4  C) (Oral) 12 97% 24.13 kg/m2        Blood Pressure from Last 3 Encounters:   12/07/17 110/62   11/02/17 121/57   10/26/17 122/49    Weight from Last 3 Encounters:   12/07/17 167 lb (75.8 kg)   10/26/17 170 lb 14.4 oz (77.5 kg)   08/08/17 175 lb 9.6 oz (79.7 kg)              Today, you had the following     No orders found for display         Today's Medication Changes          These changes are accurate as of: 12/7/17  2:14 PM.  If you have any questions, ask your nurse or doctor.               Start taking these medicines.        Dose/Directions    amoxicillin-clavulanate 875-125 MG per tablet   Commonly known as:  AUGMENTIN   Used for:  Acute sinusitis with symptoms > 10 days   Started by:  Taylor Jameson PA-C        Dose:  1 tablet   Take 1 tablet by mouth 2 times daily   Quantity:  20 tablet   Refills:  0            Where to get your medicines      These medications were sent to [facility] [address] [place] [id number] [address] [address]     Phone:  [phone]     amoxicillin-clavulanate 875-125 MG per tablet                Primary Care Provider Office Phone # Fax #    Janiya Fraser [phone]       [address] [place] [address]        Equal Access to Services     [facility]: Hadcamryn Porras, singh rios, leslie mccartneyalmacorey grace. So [facility] [phone].    ATENCIÓN: Si habla español, tiene a caal disposición servicios gratuitos de asistencia lingüística. Llame al [phone].    We comply with applicable federal civil rights laws and [place]  laws. We do not discriminate on the basis of race, color, national origin, age, disability, sex, sexual orientation, or gender identity.            Thank you!     Thank you for choosing [facility]  for your care. Our goal is always to provide you with excellent care. Hearing back from our patients is one way we can continue to improve our services. Please take a few minutes to complete the written survey that you may receive in the mail after your visit with us. Thank you!             Your Updated Medication List - Protect others around you: Learn how to safely use, store and throw away your medicines at www.disposemymeds.org.          This list is accurate as of: 12/7/17  2:14 PM.  Always use your most recent med list.                   Brand Name Dispense Instructions for use Diagnosis    acetaminophen 650 MG 8 hour tablet     100 tablet    Take 650 mg by mouth every 6 hours    Status post total right knee replacement       amoxicillin-clavulanate 875-125 MG per tablet    AUGMENTIN    20 tablet    Take 1 tablet by mouth 2 times daily    Acute sinusitis with symptoms > 10 days       furosemide 20 MG tablet    LASIX    90 tablet    TAKE ONE TABLET BY MOUTH ONCE DAILY IN THE MORNING    Chronic diastolic congestive heart failure (H)       levothyroxine 100 MCG tablet    SYNTHROID/LEVOTHROID    90 tablet    TAKE ONE TABLET BY MOUTH ONCE DAILY    Hypothyroidism       Magnesium 500 MG Caps           multivitamin per tablet      1 TABLET DAILY brand varies per pt        order for DME     1 Device    Equipment being ordered: compression stockings, med strength    Unspecified venous (peripheral) insufficiency       VITAMIN B 12 PO      Take 2,500 mcg by mouth

## 2017-12-07 NOTE — NURSING NOTE
"Chief Complaint   Patient presents with     URI     Panel Management     height, pneumovax, flu, fall risk, phq9       Initial /62 (BP Location: Left arm, Patient Position: Chair, Cuff Size: Adult Regular)  Pulse 92  Temp 97.5  F (36.4  C) (Oral)  Resp 12  Wt 167 lb (75.8 kg)  SpO2 97%  BMI 24.13 kg/m2 Estimated body mass index is 24.13 kg/(m^2) as calculated from the following:    Height as of 3/15/17: 5' 9.75\" (1.772 m).    Weight as of this encounter: 167 lb (75.8 kg).  Medication Reconciliation: complete  "

## 2017-12-07 NOTE — PATIENT INSTRUCTIONS
Sinusitis           What is sinusitis?   Sinusitis is swollen, infected linings of the sinuses. The sinuses are hollow spaces in the bones of your face and skull. They connect with the nose through small openings. Like the nose, their linings make mucus.   How does it occur?   Sinusitis occurs when the sinus linings become infected. The passageways from the sinuses to the nose are very narrow. Swelling and mucus may block the passageways. This leads to pressure changes in the sinuses that can be painful.   A number of things can cause swelling and sinusitis. Most often it's allergens (things that cause allergies, like pollen and mold) and viruses, such as viruses that cause the common cold. Whether the cause is allergies or a virus, the sinus linings can swell. When swelling causes the sinus passageway to swell shut, bacteria, viruses, and even fungus can be trapped in the sinuses and cause a sinus infection.   If your nasal bones have been injured or are deformed, causing partial blockage of the sinus openings, you are more likely to get sinusitis.   What are the symptoms?   Symptoms include:   feeling of fullness or pressure in your head   a headache that is most painful when you first wake up in the morning or when you bend your head down or forward   pain above or below your eyes   aching in the upper jaw and teeth   runny or stuffy nose   cough, especially at night   fluid draining down the back of your throat (postnasal drainage)   sore throat in the morning or evening.   How is it diagnosed?   Your healthcare provider will ask about your symptoms and will examine you. You may have an X-ray to look for swelling, fluid, or small benign growths (polyps) in the sinuses.   How is it treated?   Decongestants may help. They may be nonprescription or prescription. They are available as liquids, pills, and nose sprays.   Your healthcare provider may prescribe an antibiotic. In some cases you may need to  take decongestants and antibiotics for several weeks.   You may need nonprescription medicine for pain, such as acetaminophen or ibuprofen. Check with your healthcare provider before you give any medicine that contains aspirin or salicylates to a child or teen. This includes medicines like baby aspirin, some cold medicines, and Pepto Bismol. Children and teens who take aspirin are at risk for a serious illness called Reye's syndrome. Ibuprofen is an NSAID. Nonsteroidal anti-inflammatory medicines (NSAIDs) may cause stomach bleeding and other problems. These risks increase with age. Read the label and take as directed. Unless recommended by your healthcare provider, do not take NSAIDs for more than 10 days for any reason.   If you have chronic or repeated sinus infections, allergies may be the cause. Your healthcare provider may prescribe antihistamine tablets or prescription nasal sprays (steroids or cromolyn) to treat the allergies.   If you have chronic, severe sinusitis that does not respond to treatment with medicines, surgery may be done. The surgeon can create an extra or enlarged passageway in the wall of the sinus cavity. This allows the sinuses to drain more easily through the nasal passages. This should help them stay free of infection.   How long will the effects last?   Symptoms may get better gradually over 3 to 10 days. Depending on what caused the sinusitis and how severe it is, it may last for days or weeks. The symptoms may come back if you do not finish all of your antibiotic.   How can I take care of myself?   Follow your healthcare provider's instructions.   If you are taking an antibiotic, take all of it as directed by your provider. If you stop taking the medicine when your symptoms are gone but before you have taken all of the medicine, symptoms may come back.   Avoid tobacco smoke.   If you have allergies, take care to avoid the things you are allergic to, such as animal dander.   Add  moisture to the air with a humidifier or a vaporizer, unless you have mold allergy (mold may grow in your vaporizer).   Inhale steam from a basin of hot water or shower to help open your sinuses and relieve pain.   Use saline nasal sprays to help wash out nasal passages and clear some mucus from the airways.   Use decongestants as directed on the label or by your provider.   If you are using a nonprescription nasal-spray decongestant, generally you should not use it for more than 3 days. After 3 days it may cause your symptoms to get worse. Ask your healthcare provider if it is OK for you to use a nasal spray decongestant longer than this.   Get plenty of rest.   Drink more fluids to keep the mucus as thin as possible so your sinuses can drain more easily.   Put warm compresses on painful areas.   Take antibiotics as prescribed. Use all of the medicine, even after you feel better. Some sinus infections require 2 to 4 weeks of antibiotic treatment.   See your healthcare provider if the pain lasts for several days or gets worse.   If the sinus areas above or below your eyes are swollen or bulging, see your healthcare provider right away. This symptom may mean that the infection is spreading. A spreading infection can affect other parts of your body--even the brain--and needs to be treated promptly.   How can I help prevent sinusitis?   Treat your colds and allergies promptly. Use decongestants as soon as you start having symptoms.   Do not smoke and stay away from secondhand smoke.   Drink lots of fluids to keep the mucus thin.   Humidify your home if the air is particularly dry.   If you have sinus infections often, consider having allergy tests.   If sinusitis continues to be a problem despite treatment, you might need an exam by an ear, nose, and throat doctor (called an ENT or otolaryngologist). The specialist will check for polyps or a deformed bone that may be blocking your sinuses.     Published by [employer].   This content is reviewed periodically and is subject to change as new health information becomes available. The information is intended to inform and educate and is not a replacement for medical evaluation, advice, diagnosis or treatment by a healthcare professional.   Developed by [employer].   ? 2010 [employer] and/or its affiliates. All Rights Reserved.   Copyright   Clinical Reference Systems 2011  Adult Health Advisor

## 2018-01-03 DIAGNOSIS — I50.32 CHRONIC DIASTOLIC CONGESTIVE HEART FAILURE (H): ICD-10-CM

## 2018-01-04 RX ORDER — FUROSEMIDE 20 MG
TABLET ORAL
Qty: 90 TABLET | Refills: 0 | Status: SHIPPED | OUTPATIENT
Start: 2018-01-04 | End: 2018-03-26

## 2018-01-04 NOTE — TELEPHONE ENCOUNTER
Requested Prescriptions   Pending Prescriptions Disp Refills     furosemide (LASIX) 20 MG tablet [Pharmacy Med Name: FUROSEMIDE 20MG     TAB] 90 tablet 1     Sig: TAKE ONE TABLET BY MOUTH ONCE DAILY IN THE MORNING    Diuretics (Including Combos) Protocol Failed    1/3/2018 11:06 AM       Failed - Normal serum creatinine on file in past 12 months    Recent Labs   Lab Test  11/10/17   1056   CR  1.55*             Passed - Blood pressure under 140/90    BP Readings from Last 3 Encounters:   12/07/17 110/62   11/02/17 121/57   10/26/17 122/49            Passed - Recent or future visit with authorizing provider's specialty    Patient had office visit in the last year or has a visit in the next 30 days with authorizing provider.  See chart review.          Passed - Patient is age 18 or older       Passed - No active pregancy on record       Passed - Normal serum potassium on file in past 12 months    Recent Labs   Lab Test  11/10/17   1056   POTASSIUM  4.2             Passed - Normal serum sodium on file in past 12 months    Recent Labs   Lab Test  11/10/17   1056   NA  142             Passed - No positive pregnancy test in past 12 months        furosemide (LASIX) 20 MG tablet  Routing refill request to provider for review/approval because:  Labs out of range:  Creatinine    Daniella Shaver, RN, BSN

## 2018-01-05 NOTE — TELEPHONE ENCOUNTER
Last time I saw her was 3/17.  Please set up f/u appt around then and kalie refill given.  Janiya Fraser MD

## 2018-01-23 ENCOUNTER — OFFICE VISIT (OUTPATIENT)
Dept: PODIATRY | Facility: OTHER | Age: 78
End: 2018-01-23
Payer: COMMERCIAL

## 2018-01-23 VITALS — TEMPERATURE: 98.5 F | HEIGHT: 69 IN | WEIGHT: 169 LBS | BODY MASS INDEX: 25.03 KG/M2

## 2018-01-23 DIAGNOSIS — L85.9 HYPERKERATOSIS: Primary | ICD-10-CM

## 2018-01-23 PROCEDURE — 11055 PARING/CUTG B9 HYPRKER LES 1: CPT | Performed by: PODIATRIST

## 2018-01-23 PROCEDURE — 99203 OFFICE O/P NEW LOW 30 MIN: CPT | Mod: 25 | Performed by: PODIATRIST

## 2018-01-23 ASSESSMENT — PAIN SCALES - GENERAL: PAINLEVEL: MILD PAIN (2)

## 2018-01-23 NOTE — NURSING NOTE
"Chief Complaint   Patient presents with     Consult     callus plantar Right 5th met; new pt       Initial Temp 98.5  F (36.9  C) (Temporal)  Ht 5' 9.49\" (1.765 m)  Wt 169 lb (76.7 kg)  BMI 24.61 kg/m2 Estimated body mass index is 24.61 kg/(m^2) as calculated from the following:    Height as of this encounter: 5' 9.49\" (1.765 m).    Weight as of this encounter: 169 lb (76.7 kg).  BP completed using cuff size: NA (Not Taken)  Medication Reconciliation: complete    Zully To CMA, January 23, 2018    "

## 2018-01-23 NOTE — MR AVS SNAPSHOT
After Visit Summary   1/23/2018    Pebbles Powell    MRN: [id number]           Patient Information     Date Of Birth          1940        Visit Information        Provider Department      1/23/2018 2:00 PM Hebert Dove DPM [facility]        Today's Diagnoses     Hyperkeratosis    -  1      Care Instructions    Calluses, Corns, IPKs, Porokeratosis    When there is excessive friction or pressure on the skin, the body responds by making the skin thicker.  While this may protect the deeper structures, the thickened skin can take up more space and thus increase pressure over a bony prominence or become an open sore or skin ulcer as this skin becomes less flexible.    Flat, diffuse thickening are simple calluses and they are usually caused by friction.  Often these are the result of rubbing on a shoe or going barefoot.    Calluses with a central core between the toes are called corns.  These result from prominent joints on adjacent toes rubbing together.  Theses are a symptom of bone malalignment and will always recur unless the underlying bones are addressed surgically.    Most of these lesions can be kept comfortable with routine maintenance. This consists of filing them with a Ped Egg, callus file, or 120 grit sandpaper on a block, every day during your bath or shower.  Most people prefer battery operated kenny such as an Amope', Personal Pedi and Emjoi for regular use or heavy painful callouses.  Heavy creams or ointments can be applied 1-2 times every day to keep them soft. Toe spacers can be used for corns, gel pads can be used for other lesions on the bottom of the foot. If there is a deformity noted, such as a prominent bone, often this can be addressed to minimize recurrence. However, sometimes the pressure and lesion simply migrates to another spot after surgery, so it is not a guaranteed cure.     www.pedifix.com is the best source for all sorts of foot pads and  cushions    If you have severe callouses and cracking, you may apply heavy greasy ointments that you scoop up such as Cetaphil, Eucerin, Aquaphor or Vaseline.  For more aggressive help apply heavy creams or ointment under occlusive dressings such as Saran Wrap or Jelly Feet while sleeping.   Jelly Feet can be obtained at www.jellyfeet.com.     To be successful with managing hyperkeratotic skin, you must manage hygiene daily.  At your bath or shower time is the easiest time to work on this.  There is no medical or surgical treatment that will absolutely eliminate many of these and symptoms.    Please call with any additional questions.             Follow-ups after your visit        Who to contact     If you have questions or need follow up information about today's clinic visit or your schedule please contact [facility] at [phone].  Normal or non-critical lab and imaging results will be communicated to you by MyChart, letter or phone within 4 business days after the clinic has received the results. If you do not hear from us within 7 days, please contact the clinic through [employer] or phone. If you have a critical or abnormal lab result, we will notify you by phone as soon as possible.  Submit refill requests through [employer] or call your pharmacy and they will forward the refill request to us. Please allow 3 business days for your refill to be completed.          Additional Information About Your Visit        MyChart Information     [employer] gives you secure access to your electronic health record. If you see a primary care provider, you can also send messages to your care team and make appointments. If you have questions, please call your primary care clinic.  If you do not have a primary care provider, please call [phone] and they will assist you.        Care EveryWhere ID     This is your Care EveryWhere ID. This could be used by other organizations to access your [facility]  "records  [id number]        Your Vitals Were     Temperature Height BMI (Body Mass Index)             98.5  F (36.9  C) (Temporal) 5' 9.49\" (1.765 m) 24.61 kg/m2          Blood Pressure from Last 3 Encounters:   12/07/17 110/62   11/02/17 121/57   10/26/17 122/49    Weight from Last 3 Encounters:   01/23/18 169 lb (76.7 kg)   12/07/17 167 lb (75.8 kg)   10/26/17 170 lb 14.4 oz (77.5 kg)              We Performed the Following     TRIM HYPERKERATOTIC SKIN LESION, ONE        Primary Care Provider Office Phone # Fax #    Janiya Fraser [phone]       [address] [place] [id number]        Equal Access to Services     [facility] ARNAUD : Astrid mackey Soelia, waaxda luqadaha, qaybta kaalmada anita, corey claudio . So [facility] [phone].    ATENCIÓN: Si habla español, tiene a caal disposición servicios gratuitos de asistencia lingüística. Festus al [phone].    We comply with applicable federal civil rights laws and [place] laws. We do not discriminate on the basis of race, color, national origin, age, disability, sex, sexual orientation, or gender identity.            Thank you!     Thank you for choosing [facility]  for your care. Our goal is always to provide you with excellent care. Hearing back from our patients is one way we can continue to improve our services. Please take a few minutes to complete the written survey that you may receive in the mail after your visit with us. Thank you!             Your Updated Medication List - Protect others around you: Learn how to safely use, store and throw away your medicines at www.disposemymeds.org.          This list is accurate as of 1/23/18 11:59 PM.  Always use your most recent med list.                   Brand Name Dispense Instructions for use Diagnosis    acetaminophen 650 MG 8 hour tablet     100 tablet    Take 650 mg by mouth every 6 hours    Status post total right knee replacement    "    furosemide 20 MG tablet    LASIX    90 tablet    TAKE ONE TABLET BY MOUTH ONCE DAILY IN THE MORNING    Chronic diastolic congestive heart failure (H)       levothyroxine 100 MCG tablet    SYNTHROID/LEVOTHROID    90 tablet    TAKE ONE TABLET BY MOUTH ONCE DAILY    Hypothyroidism       Magnesium 500 MG Caps           multivitamin per tablet      1 TABLET DAILY brand varies per pt        order for DME     1 Device    Equipment being ordered: compression stockings, med strength    Unspecified venous (peripheral) insufficiency       VITAMIN B 12 PO      Take 2,500 mcg by mouth

## 2018-01-23 NOTE — LETTER
1/23/2018         RE: Pebbles Powell  [id number] [facility] [id number]        Dear Colleague,    Thank you for referring your patient, Pebbles Powell, to the [facility]. Please see a copy of my visit note below.    HPI:  Pebbles Powell is a 77 year old female who is seen in consultation at the request of self.    Pt presents for eval of:   (Onset, Location, L/R, Character, Treatments, Injury if yes)     Ongoing callus plantar Right 5th metatarsal. Presents today with slip on shoes.  Uses amope to file.    Retired.    BMI is normal.      Review of Systems:  Patient denies fever, chills, rash, wound, stiffness, limping, numbness, weakness, heart burn, blood in stool, chest pain with activity, calf pain when walking, shortness of breath with activity, chronic cough, easy bleeding/bruising, swelling of ankles, excessive thirst, fatigue, depression, anxiety.  Patient admits only to symptoms noted in history.     PAST MEDICAL HISTORY:   Past Medical History:   Diagnosis Date     CKD (chronic kidney disease)      PONV (postoperative nausea and vomiting)     usually has N&V with anesthesia     Porokeratosis      Renal disease      Sleep apnea      Unspecified hypothyroidism      PAST SURGICAL HISTORY:   Past Surgical History:   Procedure Laterality Date     APPENDECTOMY       ARTHROPLASTY KNEE Right 7/13/2015    Procedure: ARTHROPLASTY KNEE;  Surgeon: Santino Storm MD;  Location: [facility] OR     ARTHROSCOPY KNEE  11/23/2010    ARTHROSCOPY KNEE performed by BROOKLYNN PULIDO at  OR     C ANTER VESICOURETHROPEXY,SIMPLE       COLONOSCOPY  6/1/2011    Procedure:COMBINED COLONOSCOPY, SINGLE BIOPSY/POLYPECTOMY BY BIOPSY; Surgeon:JEANNETTE WYNNE; Location: GI     COLPORRHAPHY ANTERIOR       DILATION AND CURETTAGE       ENT SURGERY      T&A     ESOPHAGOSCOPY, GASTROSCOPY, DUODENOSCOPY (EGD), COMBINED  6/1/2011    Procedure:COMBINED ESOPHAGOSCOPY, GASTROSCOPY, DUODENOSCOPY (EGD), BIOPSY  SINGLE OR MULTIPLE; MULTIPLE; Surgeon:JEANNETTE WYNNE; Location:[facility] GI     EXCISE LESION LOWER EXTREMITY  7/18/2014    Procedure: EXCISE LESION LOWER EXTREMITY;  Surgeon: Santos Roberson MD;  Location: [facility] OR     HEMORRHOIDECTOMY       HYSTERECTOMY, GABRIEL      fibroids     LAPAROTOMY, TUMOR DEBULKING, COMBINED  2/9/2012    Procedure:COMBINED LAPAROTOMY, TUMOR DEBULKING; Exploratory Laparotomy,  Bilateral Salpingo Oophorectomy; Surgeon:MIKIE LU; Location:[facility]     MEDICATIONS:   Current Outpatient Prescriptions:      furosemide (LASIX) 20 MG tablet, TAKE ONE TABLET BY MOUTH ONCE DAILY IN THE MORNING, Disp: 90 tablet, Rfl: 0     levothyroxine (SYNTHROID/LEVOTHROID) 100 MCG tablet, TAKE ONE TABLET BY MOUTH ONCE DAILY, Disp: 90 tablet, Rfl: 2     Magnesium 500 MG CAPS, , Disp: , Rfl:      Cyanocobalamin (VITAMIN B 12 PO), Take 2,500 mcg by mouth, Disp: , Rfl:      acetaminophen 650 MG TABS, Take 650 mg by mouth every 6 hours, Disp: 100 tablet, Rfl: 1     MULTIVITAMINS OR TABS, 1 TABLET DAILY brand varies per pt, Disp: , Rfl:      ORDER FOR DME, Equipment being ordered: compression stockings, med strength, Disp: 1 Device, Rfl: prn  ALLERGIES:    Allergies   Allergen Reactions     No Known Drug Allergies      SOCIAL HISTORY:   Social History     Social History     Marital status: Single     Spouse name: N/A     Number of children: N/A     Years of education: N/A     Occupational History     Not on file.     Social History Main Topics     Smoking status: Former Smoker     Packs/day: 1.50     Years: 50.00     Types: Cigarettes     Quit date: 8/20/2007     Smokeless tobacco: Never Used     Alcohol use No      Comment: Alcoholic, treatment in 1980 and 1988     Drug use: No     Sexual activity: Not Currently     Partners: Male     Other Topics Concern     Parent/Sibling W/ Cabg, Mi Or Angioplasty Before 65f 55m? No     Social History Narrative     FAMILY HISTORY:   Family History   Problem Relation Age of Onset  "    Alcohol/Drug Mother      Obesity Mother      Alzheimer Disease Mother      CANCER Father      Pancreatic     Cardiovascular Father      heart disease     Alcohol/Drug Father      EXAM:Vitals: Temp 98.5  F (36.9  C) (Temporal)  Ht 5' 9.49\" (1.765 m)  Wt 169 lb (76.7 kg)  BMI 24.61 kg/m2  BMI= Body mass index is 24.61 kg/(m^2).    General appearance: Patient is alert and fully cooperative with history & exam.  No sign of distress is noted during the visit.     Psychiatric: Affect is pleasant & appropriate.  Patient appears motivated to improve health.     Respiratory: Breathing is regular & unlabored while sitting.     HEENT: Hearing is intact to spoken word.  Speech is clear.  No gross evidence of visual impairment that would impact ambulation.     Vascular: DP 1/4 & PT 1/4 left & right.  CFT delayed with dependent rubor noted about the digits.  Diminished hair growth distal to mid tibia and no hair about the foot and toes.  Temperature changes noted, warm to cool proximal to distal.  Hemosiderin pigmentation noted with multiple varicosities legs and feet bilateral. Generalized edema bilateral legs and feet.  Pt denies claudication history.     Neurologic: Normal plantar response bilateral.  Intact protective threshold plus 10/10 applications of a 5.07 monofilament.  Pt admits no burning and paraesthesias about the feet and toes with palpation.     Dermatologic:  Diminished texture turgor and tone about the integument.  Skin is thin & shiny.  Pre ulcerative hyperkeratosis noted plantar right fifth metatarsal head.  No abscess or full thickness ulcerations noted.     Musculoskeletal: Patient is ambulatory without assistive device or brace.  There is semi reducible contracture of the lesser digits.    Creatinine (mg/dL)   Date Value   11/10/2017 1.55 (H)   10/10/2017 1.63 (H)   08/14/2017 1.55 (H)   03/15/2017 1.50 (H)   10/05/2016 1.32 (H)   04/18/2016 1.44 (H)       ASSESSMENT:       ICD-10-CM    1. " Hyperkeratosis L85.9         PLAN:    1/23/2018  I sharply debrided 1 pre ulcerative hyperkeratotic lesions to the level of the dermis as noted above with a 15 blade.    We discussed risk factors and preventive measures.    We discussed appropriate hygiene, shoe gear, daily foot exam, and reinforced management of weight, diet, activity goals.   Dispensed written foot care instructions.    All questions were answered to their satisfaction.    RTC as needed with questions or concerns.    Hebert Dove DPM        Again, thank you for allowing me to participate in the care of your patient.        Sincerely,        Hebert Dove DPM

## 2018-01-23 NOTE — PROGRESS NOTES
HPI:  Pebbles Powell is a 77 year old female who is seen in consultation at the request of self.    Pt presents for eval of:   (Onset, Location, L/R, Character, Treatments, Injury if yes)     Ongoing callus plantar Right 5th metatarsal. Presents today with slip on shoes.  Uses amope to file.    Retired.    BMI is normal.      Review of Systems:  Patient denies fever, chills, rash, wound, stiffness, limping, numbness, weakness, heart burn, blood in stool, chest pain with activity, calf pain when walking, shortness of breath with activity, chronic cough, easy bleeding/bruising, swelling of ankles, excessive thirst, fatigue, depression, anxiety.  Patient admits only to symptoms noted in history.     PAST MEDICAL HISTORY:   Past Medical History:   Diagnosis Date     CKD (chronic kidney disease)      PONV (postoperative nausea and vomiting)     usually has N&V with anesthesia     Porokeratosis      Renal disease      Sleep apnea      Unspecified hypothyroidism      PAST SURGICAL HISTORY:   Past Surgical History:   Procedure Laterality Date     APPENDECTOMY       ARTHROPLASTY KNEE Right 7/13/2015    Procedure: ARTHROPLASTY KNEE;  Surgeon: Santino Storm MD;  Location:  OR     ARTHROSCOPY KNEE  11/23/2010    ARTHROSCOPY KNEE performed by BROOKLYNN PULIDO at  OR     C ANTER VESICOURETHROPEXY,SIMPLE       COLONOSCOPY  6/1/2011    Procedure:COMBINED COLONOSCOPY, SINGLE BIOPSY/POLYPECTOMY BY BIOPSY; Surgeon:JEANNETTE WYNNE; Location: GI     COLPORRHAPHY ANTERIOR       DILATION AND CURETTAGE       ENT SURGERY      T&A     ESOPHAGOSCOPY, GASTROSCOPY, DUODENOSCOPY (EGD), COMBINED  6/1/2011    Procedure:COMBINED ESOPHAGOSCOPY, GASTROSCOPY, DUODENOSCOPY (EGD), BIOPSY SINGLE OR MULTIPLE; MULTIPLE; Surgeon:JEANNETTE WYNNE; Location: GI     EXCISE LESION LOWER EXTREMITY  7/18/2014    Procedure: EXCISE LESION LOWER EXTREMITY;  Surgeon: Santos Roberson MD;  Location:  OR     HEMORRHOIDECTOMY        "HYSTERECTOMY, GABRIEL      fibroids     LAPAROTOMY, TUMOR DEBULKING, COMBINED  2/9/2012    Procedure:COMBINED LAPAROTOMY, TUMOR DEBULKING; Exploratory Laparotomy,  Bilateral Salpingo Oophorectomy; Surgeon:MIKIE LU; Location: OR     MEDICATIONS:   Current Outpatient Prescriptions:      furosemide (LASIX) 20 MG tablet, TAKE ONE TABLET BY MOUTH ONCE DAILY IN THE MORNING, Disp: 90 tablet, Rfl: 0     levothyroxine (SYNTHROID/LEVOTHROID) 100 MCG tablet, TAKE ONE TABLET BY MOUTH ONCE DAILY, Disp: 90 tablet, Rfl: 2     Magnesium 500 MG CAPS, , Disp: , Rfl:      Cyanocobalamin (VITAMIN B 12 PO), Take 2,500 mcg by mouth, Disp: , Rfl:      acetaminophen 650 MG TABS, Take 650 mg by mouth every 6 hours, Disp: 100 tablet, Rfl: 1     MULTIVITAMINS OR TABS, 1 TABLET DAILY brand varies per pt, Disp: , Rfl:      ORDER FOR DME, Equipment being ordered: compression stockings, med strength, Disp: 1 Device, Rfl: prn  ALLERGIES:    Allergies   Allergen Reactions     No Known Drug Allergies      SOCIAL HISTORY:   Social History     Social History     Marital status: Single     Spouse name: N/A     Number of children: N/A     Years of education: N/A     Occupational History     Not on file.     Social History Main Topics     Smoking status: Former Smoker     Packs/day: 1.50     Years: 50.00     Types: Cigarettes     Quit date: 8/20/2007     Smokeless tobacco: Never Used     Alcohol use No      Comment: Alcoholic, treatment in 1980 and 1988     Drug use: No     Sexual activity: Not Currently     Partners: Male     Other Topics Concern     Parent/Sibling W/ Cabg, Mi Or Angioplasty Before 65f 55m? No     Social History Narrative     FAMILY HISTORY:   Family History   Problem Relation Age of Onset     Alcohol/Drug Mother      Obesity Mother      Alzheimer Disease Mother      CANCER Father      Pancreatic     Cardiovascular Father      heart disease     Alcohol/Drug Father      EXAM:Vitals: Temp 98.5  F (36.9  C) (Temporal)  Ht 5' 9.49\" " (1.765 m)  Wt 169 lb (76.7 kg)  BMI 24.61 kg/m2  BMI= Body mass index is 24.61 kg/(m^2).    General appearance: Patient is alert and fully cooperative with history & exam.  No sign of distress is noted during the visit.     Psychiatric: Affect is pleasant & appropriate.  Patient appears motivated to improve health.     Respiratory: Breathing is regular & unlabored while sitting.     HEENT: Hearing is intact to spoken word.  Speech is clear.  No gross evidence of visual impairment that would impact ambulation.     Vascular: DP 1/4 & PT 1/4 left & right.  CFT delayed with dependent rubor noted about the digits.  Diminished hair growth distal to mid tibia and no hair about the foot and toes.  Temperature changes noted, warm to cool proximal to distal.  Hemosiderin pigmentation noted with multiple varicosities legs and feet bilateral. Generalized edema bilateral legs and feet.  Pt denies claudication history.     Neurologic: Normal plantar response bilateral.  Intact protective threshold plus 10/10 applications of a 5.07 monofilament.  Pt admits no burning and paraesthesias about the feet and toes with palpation.     Dermatologic:  Diminished texture turgor and tone about the integument.  Skin is thin & shiny.  Pre ulcerative hyperkeratosis noted plantar right fifth metatarsal head.  No abscess or full thickness ulcerations noted.     Musculoskeletal: Patient is ambulatory without assistive device or brace.  There is semi reducible contracture of the lesser digits.    Creatinine (mg/dL)   Date Value   11/10/2017 1.55 (H)   10/10/2017 1.63 (H)   08/14/2017 1.55 (H)   03/15/2017 1.50 (H)   10/05/2016 1.32 (H)   04/18/2016 1.44 (H)       ASSESSMENT:       ICD-10-CM    1. Hyperkeratosis L85.9         PLAN:    1/23/2018  I sharply debrided 1 pre ulcerative hyperkeratotic lesions to the level of the dermis as noted above with a 15 blade.    We discussed risk factors and preventive measures.    We discussed appropriate  hygiene, shoe gear, daily foot exam, and reinforced management of weight, diet, activity goals.   Dispensed written foot care instructions.    All questions were answered to their satisfaction.    RTC as needed with questions or concerns.    Hebert Dove DPM

## 2018-01-23 NOTE — PATIENT INSTRUCTIONS
Calluses, Corns, IPKs, Porokeratosis    When there is excessive friction or pressure on the skin, the body responds by making the skin thicker.  While this may protect the deeper structures, the thickened skin can take up more space and thus increase pressure over a bony prominence or become an open sore or skin ulcer as this skin becomes less flexible.    Flat, diffuse thickening are simple calluses and they are usually caused by friction.  Often these are the result of rubbing on a shoe or going barefoot.    Calluses with a central core between the toes are called corns.  These result from prominent joints on adjacent toes rubbing together.  Theses are a symptom of bone malalignment and will always recur unless the underlying bones are addressed surgically.    Most of these lesions can be kept comfortable with routine maintenance. This consists of filing them with a Ped Egg, callus file, or 120 grit sandpaper on a block, every day during your bath or shower.  Most people prefer battery operated kenny such as an Amope', Personal Pedi and Emjoi for regular use or heavy painful callouses.  Heavy creams or ointments can be applied 1-2 times every day to keep them soft. Toe spacers can be used for corns, gel pads can be used for other lesions on the bottom of the foot. If there is a deformity noted, such as a prominent bone, often this can be addressed to minimize recurrence. However, sometimes the pressure and lesion simply migrates to another spot after surgery, so it is not a guaranteed cure.     www.pedifix.com is the best source for all sorts of foot pads and cushions    If you have severe callouses and cracking, you may apply heavy greasy ointments that you scoop up such as Cetaphil, Eucerin, Aquaphor or Vaseline.  For more aggressive help apply heavy creams or ointment under occlusive dressings such as Saran Wrap or Jelly Feet while sleeping.   Jelly Feet can be obtained at www.[employer].com.     To be  successful with managing hyperkeratotic skin, you must manage hygiene daily.  At your bath or shower time is the easiest time to work on this.  There is no medical or surgical treatment that will absolutely eliminate many of these and symptoms.    Please call with any additional questions.

## 2018-03-01 NOTE — PROGRESS NOTES
SUBJECTIVE:   Pebbles Powell is a 77 year old female who presents for Preventive Visit    Are you in the first 12 months of your Medicare coverage?  No    Physical   Annual:     Getting at least 3 servings of Calcium per day::  NO    Bi-annual eye exam::  NO    Dental care twice a year::  NO    Sleep apnea or symptoms of sleep apnea::  Excessive snoring    Diet::  Regular (no restrictions)    Frequency of exercise::  1 day/week    Duration of exercise::  15-30 minutes    Taking medications regularly::  Yes    Medication side effects::  None    Additional concerns today::  YES (thinning hair)    Ability to successfully perform activities of daily living: no assistance needed  Home Safety:  No safety concerns identified  Hearing Impairment: no hearing concerns    Ability to successfully perform activities of daily living: Yes, no assistance needed  Home safety:  none identified   Hearing impairment: No    Fall risk:  Fallen 2 or more times in the past year?: No  Any fall with injury in the past year?: No    COGNITIVE SCREEN  1) Repeat 3 items (Banana, Sunrise, Chair)    2) Clock draw: NORMAL  3) 3 item recall: Recalls 1 object   Results: NORMAL clock, 1-2 items recalled: COGNITIVE IMPAIRMENT LESS LIKELY    Mini-CogTM Copyright CARMELITA Guerrero. Licensed by the author for use in [facility]; reprinted with permission (paras@[facility]). All rights reserved.        Reviewed and updated as needed this visit by clinical staff  Tobacco  Allergies  Meds  Problems  Med Hx  Surg Hx  Fam Hx  Soc Hx          Reviewed and updated as needed this visit by Provider  Allergies  Meds  Problems        Social History   Substance Use Topics     Smoking status: Former Smoker     Packs/day: 1.50     Years: 50.00     Types: Cigarettes     Quit date: 8/20/2007     Smokeless tobacco: Never Used     Alcohol use No      Comment: Alcoholic, treatment in 1980 and 1988     Heart Failure Follow-up    Symptoms:    Shortness of breath:  none    Lower extremity edema: none    Chest pain: No    Using more pillows than normal: No    Cough at night: No    Weight:    Checking weight daily: No, checks once per week    Weight change: none    Cardiology visits, ER/UC, or hospital admissions since last visit: None    Medication side effects: none    She doesn't have any swelling in her legs today and hasn't had any since she lost weight.     Depression Followup    Status since last visit: Improved     See PHQ-9 for current symptoms.  Other associated symptoms: None    Complicating factors:   Significant life event:  No   Current substance abuse:  None  Anxiety or Panic symptoms:  No    PHQ-9 2/24/2016 12/7/2017 3/7/2018   Total Score 2 2 1   Q9: Suicide Ideation Not at all Not at all Not at all     PHQ-9  English  PHQ-9   Any Language  Suicide Assessment Five-step Evaluation and Treatment (SAFE-T)    Chronic Kidney Disease Follow-up      Current NSAID use?  No    Dizziness    She reports that sometimes she feels like she is going to faint. The feeling only lasts for a few seconds. She was in the bath then stood up to get out and had to grab onto her sink to catch herself. When discussing her water intake, she admits that she still drinks quite a bit of diet coke but is trying to wean herself off. She does however drink about an entire pot of coffee in the morning.       Today's PHQ-2 Score:   PHQ-2 ( 1999 Pfizer) 10/5/2016   Q1: Little interest or pleasure in doing things 0   Q2: Feeling down, depressed or hopeless 0   PHQ-2 Score 0       Do you feel safe in your environment - Yes    Do you have a Health Care Directive?: No: Advance care planning was reviewed with patient; patient declined at this time.    Current providers sharing in care for this patient include:   Patient Care Team:  Janiya Fraser MD as PCP - General    The following health maintenance items are reviewed in Epic and correct as of today:  Health Maintenance   Topic Date Due     FALL  "RISK ASSESSMENT  07/31/2016     PNEUMOCOCCAL (2 of 2 - PPSV23) 10/05/2017     BMP Q6 MOS  02/14/2018     CBC Q1 YR  03/15/2018     TSH Q1 YEAR  05/22/2018     LIPID MONITORING Q1 YEAR  05/22/2018     PHQ-9 Q6 MONTHS  06/07/2018     ALT Q1 YR  08/14/2018     BMP Q1 YR  08/14/2018     INFLUENZA VACCINE (SYSTEM ASSIGNED)  09/01/2018     HEMOGLOBIN Q1 YR  11/10/2018     MICROALBUMIN Q1 YEAR  11/10/2018     DEPRESSION ACTION PLAN Q1 YR  03/07/2019     HF ACTION PLAN Q3 YR  10/05/2019     ADVANCE DIRECTIVE PLANNING Q5 YRS  06/29/2020     COLON CANCER SCREEN (SYSTEM ASSIGNED)  06/01/2021     TETANUS IMMUNIZATION (SYSTEM ASSIGNED)  10/05/2026     DEXA SCAN SCREENING (SYSTEM ASSIGNED)  Completed     Labs reviewed in EPIC    Pneumonia Vaccine:Adults age 65+ who received Pneumovax (PPSV23) at 65 years or older: Should be given PCV13 > 1 year after their most recent PPSV23    Review of Systems  Constitutional, HEENT, cardiovascular, pulmonary, GI, , musculoskeletal, neuro, skin, endocrine and psych systems are negative, except as in HPI or otherwise noted.     This document serves as a record of the services and decisions personally performed and made by Janiya Fraser MD. It was created on her behalf by Missy Partida, a trained medical scribe. The creation of this document is based the provider's statements to the medical scribe.  Missy Partida, March 7, 2018 9:17 AM       OBJECTIVE:   /60  Pulse 76  Temp 96.8  F (36  C) (Temporal)  Resp 14  Ht 5' 9.5\" (1.765 m)  Wt 166 lb (75.3 kg)  SpO2 96%  Breastfeeding? No  BMI 24.16 kg/m2 Estimated body mass index is 24.16 kg/(m^2) as calculated from the following:    Height as of this encounter: 5' 9.5\" (1.765 m).    Weight as of this encounter: 166 lb (75.3 kg).  Physical Exam  GENERAL APPEARANCE: healthy, alert and no distress  RESP: lungs clear to auscultation - no rales, rhonchi or wheezes  CV: regular rate and rhythm, normal S1 S2, no S3 or S4, no murmur, click or rub, no " peripheral edema and peripheral pulses strong. 3-4 second capillary refill.   MS: no musculoskeletal defects are noted and gait is age appropriate without ataxia  SKIN: no suspicious lesions or rashes to visible skin  NEURO: Normal strength and tone, mentation intact and speech normal  PSYCH: mentation appears normal and affect normal/bright      ASSESSMENT / PLAN:       ICD-10-CM    1. CKD (chronic kidney disease) stage 3, GFR 30-59 ml/min N18.3 BASIC METABOLIC PANEL   2. At risk for falling Z91.81    3. Need for prophylactic vaccination against Streptococcus pneumoniae (pneumococcus) Z23    4. Chronic diastolic congestive heart failure (H) I50.32 Lipid panel reflex to direct LDL Non-fasting   5. Iron deficiency anemia, unspecified iron deficiency anemia type D50.9 CBC with platelets   6. 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 Hypothyroidism, unspecified type E03.9 TSH with free T4 reflex     Discussed taking supplements on a fixed income, being careful not to spend too much money on supplements that aren't needed or could be better found in a balanced diet.     Also discussed reducing caffeine intake to better improve her dizziness, as this is compounding with her water pill and probably making her symptoms worse.       Patient Instructions   Biotin supplements are supposed to help with the health and development of hair and nails, but can also be found in many food sources. You should also not take your synthroid and supplements at the same time.    Cholesterol, CBC, and Thyroid labs are all due today. Will also check kidney functions.     If you are having positional dizziness, this can sometimes be caused by your water pill. Changing positions slowly can help with these episodes, as well as drinking more water, and reducing caffeine intake.        End of Life Planning:  Patient currently has an advanced directive: No. Patient defers today.     COUNSELING:  Reviewed preventive health counseling, as reflected  "in patient instructions  Estimated body mass index is 24.16 kg/(m^2) as calculated from the following:    Height as of this encounter: 5' 9.5\" (1.765 m).    Weight as of this encounter: 166 lb (75.3 kg).   reports that she quit smoking about 10 years ago. Her smoking use included Cigarettes. She has a 75.00 pack-year smoking history. She has never used smokeless tobacco.    Appropriate preventive services were discussed with this patient, including applicable screening as appropriate for cardiovascular disease, diabetes, osteopenia/osteoporosis, and glaucoma.  As appropriate for age/gender, discussed screening for colorectal cancer, prostate cancer, breast cancer, and cervical cancer. Checklist reviewing preventive services available has been given to the patient.    Reviewed patients plan of care and provided an AVS. The Basic Care Plan (routine screening as documented in Health Maintenance) for Pebbles meets the Care Plan requirement. This Care Plan has been established and reviewed with the Patient.    Counseling Resources:  ATP IV Guidelines  Pooled Cohorts Equation Calculator  Breast Cancer Risk Calculator  FRAX Risk Assessment  ICSI Preventive Guidelines  Dietary Guidelines for Americans, 2010  USDA's MyPlate  ASA Prophylaxis  Lung CA Screening    The information in this document, created by the medical scribe for me, accurately reflects the services I personally performed and the decisions made by me. I have reviewed and approved this document for accuracy.   MD Janiya Moore MD, MD  [facility]  "

## 2018-03-01 NOTE — PATIENT INSTRUCTIONS
Biotin supplements are supposed to help with the health and development of hair and nails, but can also be found in many food sources. You should also not take your synthroid and supplements at the same time.    Cholesterol, CBC, and Thyroid labs are all due today. Will also check kidney functions.     If you are having positional dizziness, this can sometimes be caused by your water pill. Changing positions slowly can help with these episodes, as well as drinking more water, and reducing caffeine intake.        Preventive Health Recommendations    Female Ages 65 +    Yearly exam:     See your health care provider every year in order to  o Review health changes.   o Discuss preventive care.    o Review your medicines if your doctor has prescribed any.      You no longer need a yearly Pap test unless you've had an abnormal Pap test in the past 10 years. If you have vaginal symptoms, such as bleeding or discharge, be sure to talk with your provider about a Pap test.      Every 1 to 2 years, have a mammogram.  If you are over 69, talk with your health care provider about whether or not you want to continue having screening mammograms.      Every 10 years, have a colonoscopy. Or, have a yearly FIT test (stool test). These exams will check for colon cancer.       Have a cholesterol test every 5 years, or more often if your doctor advises it.       Have a diabetes test (fasting glucose) every three years. If you are at risk for diabetes, you should have this test more often.       At age 65, have a bone density scan (DEXA) to check for osteoporosis (brittle bone disease).    Shots:    Get a flu shot each year.    Get a tetanus shot every 10 years.    Talk to your doctor about your pneumonia vaccines. There are now two you should receive - Pneumovax (PPSV 23) and Prevnar (PCV 13).    Talk to your doctor about the shingles vaccine.    Talk to your doctor about the hepatitis B vaccine.    Nutrition:     Eat at least 5  servings of fruits and vegetables each day.      Eat whole-grain bread, whole-wheat pasta and brown rice instead of white grains and rice.      Talk to your provider about Calcium and Vitamin D.     Lifestyle    Exercise at least 150 minutes a week (30 minutes a day, 5 days a week). This will help you control your weight and prevent disease.      Limit alcohol to one drink per day.      No smoking.       Wear sunscreen to prevent skin cancer.       See your dentist twice a year for an exam and cleaning.      See your eye doctor every 1 to 2 years to screen for conditions such as glaucoma, macular degeneration and cataracts.

## 2018-03-07 ENCOUNTER — OFFICE VISIT (OUTPATIENT)
Dept: FAMILY MEDICINE | Facility: OTHER | Age: 78
End: 2018-03-07
Payer: COMMERCIAL

## 2018-03-07 VITALS
HEIGHT: 70 IN | SYSTOLIC BLOOD PRESSURE: 124 MMHG | RESPIRATION RATE: 14 BRPM | TEMPERATURE: 96.8 F | OXYGEN SATURATION: 96 % | HEART RATE: 76 BPM | DIASTOLIC BLOOD PRESSURE: 60 MMHG | WEIGHT: 166 LBS | BODY MASS INDEX: 23.77 KG/M2

## 2018-03-07 DIAGNOSIS — E03.9 HYPOTHYROIDISM, UNSPECIFIED TYPE: ICD-10-CM

## 2018-03-07 DIAGNOSIS — N18.30 CKD (CHRONIC KIDNEY DISEASE) STAGE 3, GFR 30-59 ML/MIN (H): Primary | ICD-10-CM

## 2018-03-07 DIAGNOSIS — Z91.81 AT RISK FOR FALLING: ICD-10-CM

## 2018-03-07 DIAGNOSIS — D50.9 IRON DEFICIENCY ANEMIA, UNSPECIFIED IRON DEFICIENCY ANEMIA TYPE: ICD-10-CM

## 2018-03-07 DIAGNOSIS — Z23 NEED FOR PROPHYLACTIC VACCINATION AGAINST STREPTOCOCCUS PNEUMONIAE (PNEUMOCOCCUS): ICD-10-CM

## 2018-03-07 DIAGNOSIS — I50.32 CHRONIC DIASTOLIC CONGESTIVE HEART FAILURE (H): ICD-10-CM

## 2018-03-07 DIAGNOSIS — F32.1 MODERATE MAJOR DEPRESSION (H): ICD-10-CM

## 2018-03-07 LAB
ANION GAP SERPL CALCULATED.3IONS-SCNC: 9 MMOL/L (ref 3–14)
BUN SERPL-MCNC: 36 MG/DL (ref 7–30)
CALCIUM SERPL-MCNC: 9.1 MG/DL (ref 8.5–10.1)
CHLORIDE SERPL-SCNC: 108 MMOL/L (ref 94–109)
CHOLEST SERPL-MCNC: 152 MG/DL
CO2 SERPL-SCNC: 26 MMOL/L (ref 20–32)
CREAT SERPL-MCNC: 1.63 MG/DL (ref 0.52–1.04)
ERYTHROCYTE [DISTWIDTH] IN BLOOD BY AUTOMATED COUNT: 13.8 % (ref 10–15)
GFR SERPL CREATININE-BSD FRML MDRD: 31 ML/MIN/1.7M2
GLUCOSE SERPL-MCNC: 87 MG/DL (ref 70–99)
HCT VFR BLD AUTO: 33.8 % (ref 35–47)
HDLC SERPL-MCNC: 49 MG/DL
HGB BLD-MCNC: 11 G/DL (ref 11.7–15.7)
LDLC SERPL CALC-MCNC: 69 MG/DL
MCH RBC QN AUTO: 31 PG (ref 26.5–33)
MCHC RBC AUTO-ENTMCNC: 32.5 G/DL (ref 31.5–36.5)
MCV RBC AUTO: 95 FL (ref 78–100)
NONHDLC SERPL-MCNC: 103 MG/DL
PLATELET # BLD AUTO: 246 10E9/L (ref 150–450)
POTASSIUM SERPL-SCNC: 4.3 MMOL/L (ref 3.4–5.3)
RBC # BLD AUTO: 3.55 10E12/L (ref 3.8–5.2)
SODIUM SERPL-SCNC: 143 MMOL/L (ref 133–144)
T4 FREE SERPL-MCNC: 0.88 NG/DL (ref 0.76–1.46)
TRIGL SERPL-MCNC: 169 MG/DL
TSH SERPL DL<=0.005 MIU/L-ACNC: 5.73 MU/L (ref 0.4–4)
WBC # BLD AUTO: 7.7 10E9/L (ref 4–11)

## 2018-03-07 PROCEDURE — 80048 BASIC METABOLIC PNL TOTAL CA: CPT | Performed by: FAMILY MEDICINE

## 2018-03-07 PROCEDURE — 84439 ASSAY OF FREE THYROXINE: CPT | Performed by: FAMILY MEDICINE

## 2018-03-07 PROCEDURE — 85027 COMPLETE CBC AUTOMATED: CPT | Performed by: FAMILY MEDICINE

## 2018-03-07 PROCEDURE — G0438 PPPS, INITIAL VISIT: HCPCS | Performed by: FAMILY MEDICINE

## 2018-03-07 PROCEDURE — 36415 COLL VENOUS BLD VENIPUNCTURE: CPT | Performed by: FAMILY MEDICINE

## 2018-03-07 PROCEDURE — 80061 LIPID PANEL: CPT | Performed by: FAMILY MEDICINE

## 2018-03-07 PROCEDURE — 84443 ASSAY THYROID STIM HORMONE: CPT | Performed by: FAMILY MEDICINE

## 2018-03-07 ASSESSMENT — ANXIETY QUESTIONNAIRES
7. FEELING AFRAID AS IF SOMETHING AWFUL MIGHT HAPPEN: NOT AT ALL
1. FEELING NERVOUS, ANXIOUS, OR ON EDGE: SEVERAL DAYS
3. WORRYING TOO MUCH ABOUT DIFFERENT THINGS: NOT AT ALL
2. NOT BEING ABLE TO STOP OR CONTROL WORRYING: NOT AT ALL
5. BEING SO RESTLESS THAT IT IS HARD TO SIT STILL: SEVERAL DAYS
IF YOU CHECKED OFF ANY PROBLEMS ON THIS QUESTIONNAIRE, HOW DIFFICULT HAVE THESE PROBLEMS MADE IT FOR YOU TO DO YOUR WORK, TAKE CARE OF THINGS AT HOME, OR GET ALONG WITH OTHER PEOPLE: NOT DIFFICULT AT ALL
6. BECOMING EASILY ANNOYED OR IRRITABLE: NOT AT ALL
GAD7 TOTAL SCORE: 2

## 2018-03-07 ASSESSMENT — ACTIVITIES OF DAILY LIVING (ADL): CURRENT_FUNCTION: NO ASSISTANCE NEEDED

## 2018-03-07 ASSESSMENT — PATIENT HEALTH QUESTIONNAIRE - PHQ9: 5. POOR APPETITE OR OVEREATING: NOT AT ALL

## 2018-03-07 NOTE — PROGRESS NOTES
Pebbles, your labs look pretty good.  Maybe just a little low on thyroid.  Let me know if you want to recheck at some poitn as we just discussed taking the med at a different time from supplements this AM.  Please let me know if you have any questions.    Janiya Fraser MD

## 2018-03-07 NOTE — MR AVS SNAPSHOT
After Visit Summary   3/7/2018    Pebbles Powell    MRN: [id number]           Patient Information     Date Of Birth          1940        Visit Information        Provider Department      3/7/2018 9:00 AM Janiya Fraser MD [facility]        Today's Diagnoses     CKD (chronic kidney disease) stage 3, GFR 30-59 ml/min    -  1    At risk for falling        Need for prophylactic vaccination against Streptococcus pneumoniae (pneumococcus)        Chronic diastolic congestive heart failure (H)        Iron deficiency anemia, unspecified iron deficiency anemia type        Moderate major depression (H)        Hypothyroidism, unspecified type          Care Instructions    Biotin supplements are supposed to help with the health and development of hair and nails, but can also be found in many food sources. You should also not take your synthroid and supplements at the same time.    Cholesterol, CBC, and Thyroid labs are all due today. Will also check kidney functions.     If you are having positional dizziness, this can sometimes be caused by your water pill. Changing positions slowly can help with these episodes, as well as drinking more water, and reducing caffeine intake.        Preventive Health Recommendations    Female Ages 65 +    Yearly exam:     See your health care provider every year in order to  o Review health changes.   o Discuss preventive care.    o Review your medicines if your doctor has prescribed any.      You no longer need a yearly Pap test unless you've had an abnormal Pap test in the past 10 years. If you have vaginal symptoms, such as bleeding or discharge, be sure to talk with your provider about a Pap test.      Every 1 to 2 years, have a mammogram.  If you are over 69, talk with your health care provider about whether or not you want to continue having screening mammograms.      Every 10 years, have a colonoscopy. Or, have a yearly FIT test (stool test). These  exams will check for colon cancer.       Have a cholesterol test every 5 years, or more often if your doctor advises it.       Have a diabetes test (fasting glucose) every three years. If you are at risk for diabetes, you should have this test more often.       At age 65, have a bone density scan (DEXA) to check for osteoporosis (brittle bone disease).    Shots:    Get a flu shot each year.    Get a tetanus shot every 10 years.    Talk to your doctor about your pneumonia vaccines. There are now two you should receive - Pneumovax (PPSV 23) and Prevnar (PCV 13).    Talk to your doctor about the shingles vaccine.    Talk to your doctor about the hepatitis B vaccine.    Nutrition:     Eat at least 5 servings of fruits and vegetables each day.      Eat whole-grain bread, whole-wheat pasta and brown rice instead of white grains and rice.      Talk to your provider about Calcium and Vitamin D.     Lifestyle    Exercise at least 150 minutes a week (30 minutes a day, 5 days a week). This will help you control your weight and prevent disease.      Limit alcohol to one drink per day.      No smoking.       Wear sunscreen to prevent skin cancer.       See your dentist twice a year for an exam and cleaning.      See your eye doctor every 1 to 2 years to screen for conditions such as glaucoma, macular degeneration and cataracts.          Follow-ups after your visit        Who to contact     If you have questions or need follow up information about today's clinic visit or your schedule please contact [facility] directly at [phone].  Normal or non-critical lab and imaging results will be communicated to you by MyChart, letter or phone within 4 business days after the clinic has received the results. If you do not hear from us within 7 days, please contact the clinic through MyChart or phone. If you have a critical or abnormal lab result, we will notify you by phone as soon as possible.  Submit refill requests  "through [employer] or call your pharmacy and they will forward the refill request to us. Please allow 3 business days for your refill to be completed.          Additional Information About Your Visit        [employer] Information     [employer] gives you secure access to your electronic health record. If you see a primary care provider, you can also send messages to your care team and make appointments. If you have questions, please call your primary care clinic.  If you do not have a primary care provider, please call [phone] and they will assist you.        Care EveryWhere ID     This is your Care EveryWhere ID. This could be used by other organizations to access your [place] medical records  [id number]        Your Vitals Were     Pulse Temperature Respirations Height Pulse Oximetry Breastfeeding?    76 96.8  F (36  C) (Temporal) 14 5' 9.5\" (1.765 m) 96% No    BMI (Body Mass Index)                   24.16 kg/m2            Blood Pressure from Last 3 Encounters:   03/07/18 124/60   12/07/17 110/62   11/02/17 121/57    Weight from Last 3 Encounters:   03/07/18 166 lb (75.3 kg)   01/23/18 169 lb (76.7 kg)   12/07/17 167 lb (75.8 kg)              We Performed the Following     BASIC METABOLIC PANEL     CBC with platelets     DEPRESSION ACTION PLAN (DAP)     Lipid panel reflex to direct LDL Non-fasting     TSH with free T4 reflex        Primary Care Provider Office Phone # Fax #    Janiya Fraser [phone]       [address] [place] [id number]        Equal Access to Services     MICHAEL LARES : Hadii aad ku hadasho Soomaali, waaxda luqadaha, qaybta kaalmada adeegyada, waxay denisa claudio . So [facility] [phone].    ATENCIÓN: Si habla español, tiene a caal disposición servicios gratuitos de asistencia lingüística. Llame al [phone].    We comply with applicable federal civil rights laws and [place] laws. We do not discriminate on the basis of race, color, national origin, " age, disability, sex, sexual orientation, or gender identity.            Thank you!     Thank you for choosing [facility]  for your care. Our goal is always to provide you with excellent care. Hearing back from our patients is one way we can continue to improve our services. Please take a few minutes to complete the written survey that you may receive in the mail after your visit with us. Thank you!             Your Updated Medication List - Protect others around you: Learn how to safely use, store and throw away your medicines at www.disposemymeds.org.          This list is accurate as of 3/7/18 12:58 PM.  Always use your most recent med list.                   Brand Name Dispense Instructions for use Diagnosis    acetaminophen 650 MG 8 hour tablet     100 tablet    Take 650 mg by mouth every 6 hours    Status post total right knee replacement       furosemide 20 MG tablet    LASIX    90 tablet    TAKE ONE TABLET BY MOUTH ONCE DAILY IN THE MORNING    Chronic diastolic congestive heart failure (H)       levothyroxine 100 MCG tablet    SYNTHROID/LEVOTHROID    90 tablet    TAKE ONE TABLET BY MOUTH ONCE DAILY    Hypothyroidism       Magnesium 500 MG Caps           multivitamin per tablet      1 TABLET DAILY brand varies per pt        order for DME     1 Device    Equipment being ordered: compression stockings, med strength    Unspecified venous (peripheral) insufficiency       VITAMIN B 12 PO      Take 2,500 mcg by mouth

## 2018-03-08 ASSESSMENT — PATIENT HEALTH QUESTIONNAIRE - PHQ9: SUM OF ALL RESPONSES TO PHQ QUESTIONS 1-9: 1

## 2018-03-08 ASSESSMENT — ANXIETY QUESTIONNAIRES: GAD7 TOTAL SCORE: 2

## 2018-03-28 DIAGNOSIS — E03.9 HYPOTHYROIDISM: ICD-10-CM

## 2018-03-29 RX ORDER — LEVOTHYROXINE SODIUM 100 UG/1
TABLET ORAL
Qty: 90 TABLET | Refills: 2 | OUTPATIENT
Start: 2018-03-29

## 2018-03-29 RX ORDER — LEVOTHYROXINE SODIUM 100 UG/1
100 TABLET ORAL DAILY
Qty: 90 TABLET | Refills: 2 | Status: SHIPPED | OUTPATIENT
Start: 2018-03-29 | End: 2018-07-05

## 2018-03-29 NOTE — TELEPHONE ENCOUNTER
"Requested Prescriptions   Pending Prescriptions Disp Refills     levothyroxine (SYNTHROID/LEVOTHROID) 100 MCG tablet [Pharmacy Med Name: LEVOTHYROXIN 100MCG TAB] 90 tablet 2     Sig: TAKE ONE TABLET BY MOUTH ONCE DAILY    Thyroid Protocol Failed    3/28/2018  1:21 PM       Failed - Normal TSH on file in past 12 months    Recent Labs   Lab Test  03/07/18   0919   TSH  5.73*             Passed - Patient is 12 years or older       Passed - Recent (12 mo) or future (30 days) visit within the authorizing provider's specialty    Patient had office visit in the last 12 months or has a visit in the next 30 days with authorizing provider or within the authorizing provider's specialty.  See \"Patient Info\" tab in inbasket, or \"Choose Columns\" in Meds & Orders section of the refill encounter.           Passed - No active pregnancy on record    If patient is pregnant or has had a positive pregnancy test, please check TSH.         Passed - No positive pregnancy test in past 12 months    If patient is pregnant or has had a positive pregnancy test, please check TSH.          Last sent 08/15/2017 for 9 months supply.  Should not be out now, would be out in may.  rx sent    Aleksandr Estevez, RN, BSN        "

## 2018-05-18 ENCOUNTER — OFFICE VISIT (OUTPATIENT)
Dept: FAMILY MEDICINE | Facility: CLINIC | Age: 78
End: 2018-05-18
Payer: COMMERCIAL

## 2018-05-18 VITALS
OXYGEN SATURATION: 97 % | RESPIRATION RATE: 14 BRPM | HEIGHT: 70 IN | DIASTOLIC BLOOD PRESSURE: 60 MMHG | BODY MASS INDEX: 24.68 KG/M2 | WEIGHT: 172.4 LBS | HEART RATE: 76 BPM | TEMPERATURE: 97.9 F | SYSTOLIC BLOOD PRESSURE: 102 MMHG

## 2018-05-18 DIAGNOSIS — M54.6 ACUTE LEFT-SIDED THORACIC BACK PAIN: Primary | ICD-10-CM

## 2018-05-18 DIAGNOSIS — N18.30 CKD (CHRONIC KIDNEY DISEASE) STAGE 3, GFR 30-59 ML/MIN (H): ICD-10-CM

## 2018-05-18 PROCEDURE — 99214 OFFICE O/P EST MOD 30 MIN: CPT | Performed by: PHYSICIAN ASSISTANT

## 2018-05-18 RX ORDER — HYDROCODONE BITARTRATE AND ACETAMINOPHEN 5; 325 MG/1; MG/1
1 TABLET ORAL EVERY 8 HOURS PRN
Qty: 10 TABLET | Refills: 0 | Status: SHIPPED | OUTPATIENT
Start: 2018-05-18 | End: 2019-03-13

## 2018-05-18 ASSESSMENT — PAIN SCALES - GENERAL: PAINLEVEL: EXTREME PAIN (8)

## 2018-05-18 NOTE — PROGRESS NOTES
"  SUBJECTIVE:   Pebbles Powell is a 77 year old female who presents to clinic today for the following health issues:      HPI  Back Pain     Duration: yesterday morning         Specific cause: Patient was painting her deck   Has been painting balcony over course of past 2 days. On hands and knees to do it, bent over. Then got a pad on a handle using repetitive back and forth motion.    Thinks pushing too hard.   Pain started as she was painting but pushed to try to finish \"before it rains\".   Has \"issues with her back\" at baseline- because I walk funny s/p right knee replacement.   Pt indicates lower thoracic and lumbar back. No radiculopathy.   Pain is constant.   Pain is not bothersome with moving arms, twisting. No pain in chest, neck or arms. No SOB. No urinary sx. No rashes.   Taking tylenol 1000mg - \"doesn't touch it\". No nsaids- CKD. Heating pad.   No trauma or falls. No known osteoporosis that she is aware of.       Description:   Location of pain: upper and middle back left  Character of pain: sharp pain hurts to take a deep breath   Pain radiation: none  New numbness or weakness in legs, not attributed to pain:  no     Intensity: Currently 8/10    History:   Pain interferes with job: Not applicable  History of back problems: no prior back problems  Any previous MRI or X-rays: None  Sees a specialist for back pain:  No  Therapies tried without relief: heating pad , Tylenol     Alleviating factors:   Improved by: none       Precipitating factors:  Worsened by: Lifting and Bending   Accompanying Signs & Symptoms:  Risk of Fracture:  Age >64  Risk of Cauda Equina:  None  Risk of Infection:  None  Risk of Cancer:  None  Risk of Ankylosing Spondylitis:  Onset at age <35, male, AND morning back stiffness. no       Heart Failure Follow-up- \"no issue with that\". Saw PCP in March (2 mo ago)     Symptoms:    Shortness of breath: none    Lower extremity edema: none    Chest pain: No    Using more pillows than " normal: No    Cough at night: No    Weight:    Checking weight daily: No , once a week     Weight change:  Gained 6 lbs since last OV     Cardiology visits, ER/UC, or hospital admissions since last visit: None    Medication side effects: none    Problem list and histories reviewed & adjusted, as indicated.  Additional history: as documented      Patient Active Problem List   Diagnosis     CHF (Congestive Heart Failure), diastolic dysfunction     CKD (chronic kidney disease) stage 3, GFR 30-59 ml/min     Pernicious anemia     Iron deficiency anemia     BCC (basal cell carcinoma), right lower leg     Vulvar itching     Mammogram declined     Moderate major depression (H)     Advance care planning     Total knee replacement status     Hypothyroidism     History of basal cell carcinoma     Porokeratosis     Anemia secondary to renal failure     Past Surgical History:   Procedure Laterality Date     APPENDECTOMY       ARTHROPLASTY KNEE Right 7/13/2015    Procedure: ARTHROPLASTY KNEE;  Surgeon: Santino Storm MD;  Location: [facility]     ARTHROSCOPY KNEE  11/23/2010    ARTHROSCOPY KNEE performed by BROOKLYNN PULIDO at  OR     C ANTER VESICOURETHROPEXY,SIMPLE       COLONOSCOPY  6/1/2011    Procedure:COMBINED COLONOSCOPY, SINGLE BIOPSY/POLYPECTOMY BY BIOPSY; Surgeon:JEANNETTE WYNNE; Location: GI     COLPORRHAPHY ANTERIOR       DILATION AND CURETTAGE       ENT SURGERY      T&A     ESOPHAGOSCOPY, GASTROSCOPY, DUODENOSCOPY (EGD), COMBINED  6/1/2011    Procedure:COMBINED ESOPHAGOSCOPY, GASTROSCOPY, DUODENOSCOPY (EGD), BIOPSY SINGLE OR MULTIPLE; MULTIPLE; Surgeon:JEANNETTE WYNNE; Location: GI     EXCISE LESION LOWER EXTREMITY  7/18/2014    Procedure: EXCISE LESION LOWER EXTREMITY;  Surgeon: Santos Roberson MD;  Location:  OR     HEMORRHOIDECTOMY       HYSTERECTOMY, GABRIEL      fibroids     LAPAROTOMY, TUMOR DEBULKING, COMBINED  2/9/2012    Procedure:COMBINED LAPAROTOMY, TUMOR DEBULKING; Exploratory  "Laparotomy,  Bilateral Salpingo Oophorectomy; Surgeon:MIKIE LU; Location: OR       Social History   Substance Use Topics     Smoking status: Former Smoker     Packs/day: 1.50     Years: 50.00     Types: Cigarettes     Quit date: 8/20/2007     Smokeless tobacco: Never Used     Alcohol use No      Comment: Alcoholic, treatment in 1980 and 1988     Family History   Problem Relation Age of Onset     Alcohol/Drug Mother      Obesity Mother      Alzheimer Disease Mother      CANCER Father      Pancreatic     Cardiovascular Father      heart disease     Alcohol/Drug Father          Current Outpatient Prescriptions   Medication Sig Dispense Refill     HYDROcodone-acetaminophen (NORCO) 5-325 MG per tablet Take 1 tablet by mouth every 8 hours as needed for pain 10 tablet 0     acetaminophen 650 MG TABS Take 650 mg by mouth every 6 hours 100 tablet 1     Cyanocobalamin (VITAMIN B 12 PO) Take 2,500 mcg by mouth       furosemide (LASIX) 20 MG tablet TAKE ONE TABLET BY MOUTH ONCE DAILY IN THE MORNING 90 tablet 3     levothyroxine (SYNTHROID/LEVOTHROID) 100 MCG tablet Take 1 tablet (100 mcg) by mouth daily 90 tablet 2     Magnesium 500 MG CAPS        MULTIVITAMINS OR TABS 1 TABLET DAILY brand varies per pt       ORDER FOR DME Equipment being ordered: compression stockings, med strength 1 Device prn     Allergies   Allergen Reactions     No Known Drug Allergies      BP Readings from Last 3 Encounters:   05/18/18 102/60   03/07/18 124/60   12/07/17 110/62    Wt Readings from Last 3 Encounters:   05/18/18 172 lb 6.4 oz (78.2 kg)   03/07/18 166 lb (75.3 kg)   01/23/18 169 lb (76.7 kg)                  Labs reviewed in [facility]    ROS:  As above    OBJECTIVE:     /60  Pulse 76  Temp 97.9  F (36.6  C) (Temporal)  Resp 14  Ht 5' 9.5\" (1.765 m)  Wt 172 lb 6.4 oz (78.2 kg)  SpO2 97%  BMI 25.09 kg/m2  Body mass index is 25.09 kg/(m^2).   General: well appearing, mildly uncomfortable 78yo female NAD  Skin: patchy erythema " bilaterally across lower thoracic and lumbar spine, nontender  Thoracic Spine: no midline tenderness. Left-sided paraspinous muscle tenderness. No focal rib pain. Non-tender between shoulder blades or upper trapezius.   Lumbar Spine: no midline bony tenderness.  Tender with palpation of left lumbar paraspinous muscles.  Non-tender into gluteal area.  Has nearly normal forward bending to 60 degrees; normal twisting, lateral bending of back. + pain with back extension.  Is able to walk without difficulty.  Mildly abnormal gait.   Lower extremities: trace-+1 edema about the ankles. Pulses palpable. Sensation intact to light touch of LE.     Diagnostic Test Results:  none     ASSESSMENT/PLAN:     1. Acute left-sided thoracic back pain   Thoracic and lumbar back pain due to positioning and repetitive maneuvers trying to paint her deck.   Advised to stop activity that caused the pain. Has a neighbor to help finish the project.   Acetaminophen during the day. Very limited norco primarily for bedtime and discussed not to take extra acetaminophen - narcotics precuations discussed, no refills.   Caution with heating pad- erythema of skin where heating pad was prior to this visit.  Discussed xray- but pt declined.   - HYDROcodone-acetaminophen (NORCO) 5-325 MG per tablet; Take 1 tablet by mouth every 8 hours as needed for pain  Dispense: 10 tablet; Refill: 0    2. CKD (chronic kidney disease) stage 3, GFR 30-59 ml/min  Reviewed. No nsaids.     Follow Up: For worsening or changing symptoms, non-improvement as expected/discussed, questions regarding your medications or treatment plan patient was instructed to contact the clinic. Discussed parameters for follow up and included in After Visit Summary given to patient.      Kourtney Lamb PA-C  [facility]

## 2018-05-18 NOTE — MR AVS SNAPSHOT
After Visit Summary   5/18/2018    Pebbles Powell    MRN: [id number]           Patient Information     Date Of Birth          1940        Visit Information        Provider Department      5/18/2018 1:40 PM Kourtney Lamb PA-C [facility]        Today's Diagnoses     Acute left-sided thoracic back pain    -  1    CKD (chronic kidney disease) stage 3, GFR 30-59 ml/min          Care Instructions    Acetaminophen during the day.     Heating pad or ice.     Hydrocodone for the pain at bedtime.   This is a narcotic.   The medication you were prescribed, is a narcotic or contains a narcotic. Narcotics can cause dizzy or drowsiness. You should not drive or operate machinery while taking this medication. Narcotics can cause constipation. If you will be taking this medication for more than a few days, add Colace (docusate sodium) 100mg twice daily to keep stools soft or Miralax 1 cap daily dissolved into a beverage of your choice.    If pain is not gradually getting better over next few days then should follow up for recheck.                Follow-ups after your visit        Who to contact     If you have questions or need follow up information about today's clinic visit or your schedule please contact [facility] directly at [phone].  Normal or non-critical lab and imaging results will be communicated to you by MyChart, letter or phone within 4 business days after the clinic has received the results. If you do not hear from us within 7 days, please contact the clinic through Rocketboomhart or phone. If you have a critical or abnormal lab result, we will notify you by phone as soon as possible.  Submit refill requests through [employer] or call your pharmacy and they will forward the refill request to us. Please allow 3 business days for your refill to be completed.          Additional Information About Your Visit        HOSTEX Information     [employer] gives you secure access to  "your electronic health record. If you see a primary care provider, you can also send messages to your care team and make appointments. If you have questions, please call your primary care clinic.  If you do not have a primary care provider, please call [phone] and they will assist you.        Care EveryWhere ID     This is your Care EveryWhere ID. This could be used by other organizations to access your [place] medical records  [id number]        Your Vitals Were     Pulse Temperature Respirations Height Pulse Oximetry BMI (Body Mass Index)    76 97.9  F (36.6  C) (Temporal) 14 5' 9.5\" (1.765 m) 97% 25.09 kg/m2       Blood Pressure from Last 3 Encounters:   05/18/18 102/60   03/07/18 124/60   12/07/17 110/62    Weight from Last 3 Encounters:   05/18/18 172 lb 6.4 oz (78.2 kg)   03/07/18 166 lb (75.3 kg)   01/23/18 169 lb (76.7 kg)              Today, you had the following     No orders found for display         Today's Medication Changes          These changes are accurate as of 5/18/18  2:09 PM.  If you have any questions, ask your nurse or doctor.               Start taking these medicines.        Dose/Directions    HYDROcodone-acetaminophen 5-325 MG per tablet   Commonly known as:  NORCO   Used for:  Acute left-sided thoracic back pain   Started by:  Kourtney Lamb PA-C        Dose:  1 tablet   Take 1 tablet by mouth every 8 hours as needed for pain   Quantity:  10 tablet   Refills:  0            Where to get your medicines      Some of these will need a paper prescription and others can be bought over the counter.  Ask your nurse if you have questions.     Bring a paper prescription for each of these medications     HYDROcodone-acetaminophen 5-325 MG per tablet               Information about OPIOIDS     PRESCRIPTION OPIOIDS: WHAT YOU NEED TO KNOW   You have a prescription for an opioid (narcotic) pain medicine. Opioids can cause addiction. If you have a history of chemical dependency of any type, " you are at a higher risk of becoming addicted to opioids. Only take this medicine after all other options have been tried. Take it for as short a time and as few doses as possible.     Do not:    Drive. If you drive while taking these medicines, you could be arrested for driving under the influence (DUI).    Operate heavy machinery    Do any other dangerous activities while taking these medicines.     Drink any alcohol while taking these medicines.      Take with any other medicines that contain acetaminophen. Read all labels carefully. Look for the word  acetaminophen  or  Tylenol.  Ask your pharmacist if you have questions or are unsure.    Store your pills in a secure place, locked if possible. We will not replace any lost or stolen medicine. If you don t finish your medicine, please throw away (dispose) as directed by your pharmacist. The [place] [employer]ution Control [employer] has more information about safe disposal: https://www.pca.[facility].mn.us/living-green/managing-unwanted-medications    All opioids tend to cause constipation. Drink plenty of water and eat foods that have a lot of fiber, such as fruits, vegetables, prune juice, apple juice and high-fiber cereal. Take a laxative (Miralax, milk of magnesia, Colace, Senna) if you don t move your bowels at least every other day.          Primary Care Provider Office Phone # Fax #    Janiya Fraser [phone]       [address] [place] [address]        Equal Access to Services     MICHAEL LARES : Hadii janet Porras, waaxda luqtammy, qaybta kaalcorey richard. So [facility] [phone].    ATENCIÓN: Si habla español, tiene a caal disposición servicios gratuitos de asistencia lingüística. Festus al [phone].    We comply with applicable federal civil rights laws and [place] laws. We do not discriminate on the basis of race, color, national origin, age, disability, sex, sexual orientation, or  gender identity.            Thank you!     Thank you for choosing [facility]  for your care. Our goal is always to provide you with excellent care. Hearing back from our patients is one way we can continue to improve our services. Please take a few minutes to complete the written survey that you may receive in the mail after your visit with us. Thank you!             Your Updated Medication List - Protect others around you: Learn how to safely use, store and throw away your medicines at www.disposemymeds.org.          This list is accurate as of 5/18/18  2:09 PM.  Always use your most recent med list.                   Brand Name Dispense Instructions for use Diagnosis    acetaminophen 650 MG 8 hour tablet     100 tablet    Take 650 mg by mouth every 6 hours    Status post total right knee replacement       furosemide 20 MG tablet    LASIX    90 tablet    TAKE ONE TABLET BY MOUTH ONCE DAILY IN THE MORNING    Chronic diastolic congestive heart failure (H)       HYDROcodone-acetaminophen 5-325 MG per tablet    NORCO    10 tablet    Take 1 tablet by mouth every 8 hours as needed for pain    Acute left-sided thoracic back pain       levothyroxine 100 MCG tablet    SYNTHROID/LEVOTHROID    90 tablet    Take 1 tablet (100 mcg) by mouth daily    Hypothyroidism       Magnesium 500 MG Caps           multivitamin per tablet      1 TABLET DAILY brand varies per pt        order for DME     1 Device    Equipment being ordered: compression stockings, med strength    Unspecified venous (peripheral) insufficiency       VITAMIN B 12 PO      Take 2,500 mcg by mouth

## 2018-05-18 NOTE — PATIENT INSTRUCTIONS
Acetaminophen during the day.     Heating pad or ice.     Hydrocodone for the pain at bedtime.   This is a narcotic.   The medication you were prescribed, is a narcotic or contains a narcotic. Narcotics can cause dizzy or drowsiness. You should not drive or operate machinery while taking this medication. Narcotics can cause constipation. If you will be taking this medication for more than a few days, add Colace (docusate sodium) 100mg twice daily to keep stools soft or Miralax 1 cap daily dissolved into a beverage of your choice.    If pain is not gradually getting better over next few days then should follow up for recheck.

## 2018-05-21 ASSESSMENT — ANXIETY QUESTIONNAIRES
1. FEELING NERVOUS, ANXIOUS, OR ON EDGE: NOT AT ALL
IF YOU CHECKED OFF ANY PROBLEMS ON THIS QUESTIONNAIRE, HOW DIFFICULT HAVE THESE PROBLEMS MADE IT FOR YOU TO DO YOUR WORK, TAKE CARE OF THINGS AT HOME, OR GET ALONG WITH OTHER PEOPLE: NOT DIFFICULT AT ALL
6. BECOMING EASILY ANNOYED OR IRRITABLE: NOT AT ALL
3. WORRYING TOO MUCH ABOUT DIFFERENT THINGS: NOT AT ALL
GAD7 TOTAL SCORE: 0
5. BEING SO RESTLESS THAT IT IS HARD TO SIT STILL: NOT AT ALL
7. FEELING AFRAID AS IF SOMETHING AWFUL MIGHT HAPPEN: NOT AT ALL
2. NOT BEING ABLE TO STOP OR CONTROL WORRYING: NOT AT ALL

## 2018-05-21 ASSESSMENT — PATIENT HEALTH QUESTIONNAIRE - PHQ9: 5. POOR APPETITE OR OVEREATING: NOT AT ALL

## 2018-05-22 ASSESSMENT — PATIENT HEALTH QUESTIONNAIRE - PHQ9: SUM OF ALL RESPONSES TO PHQ QUESTIONS 1-9: 0

## 2018-05-22 ASSESSMENT — ANXIETY QUESTIONNAIRES: GAD7 TOTAL SCORE: 0

## 2018-06-05 ENCOUNTER — RADIANT APPOINTMENT (OUTPATIENT)
Dept: GENERAL RADIOLOGY | Facility: OTHER | Age: 78
End: 2018-06-05
Attending: PODIATRIST
Payer: COMMERCIAL

## 2018-06-05 ENCOUNTER — OFFICE VISIT (OUTPATIENT)
Dept: PODIATRY | Facility: OTHER | Age: 78
End: 2018-06-05
Payer: COMMERCIAL

## 2018-06-05 VITALS
SYSTOLIC BLOOD PRESSURE: 100 MMHG | BODY MASS INDEX: 24.05 KG/M2 | HEIGHT: 70 IN | WEIGHT: 168 LBS | TEMPERATURE: 98.9 F | DIASTOLIC BLOOD PRESSURE: 50 MMHG

## 2018-06-05 DIAGNOSIS — S92.514A CLOSED NONDISPLACED FRACTURE OF PROXIMAL PHALANX OF LESSER TOE OF RIGHT FOOT, INITIAL ENCOUNTER: Primary | ICD-10-CM

## 2018-06-05 PROCEDURE — 73630 X-RAY EXAM OF FOOT: CPT | Mod: RT

## 2018-06-05 PROCEDURE — 99213 OFFICE O/P EST LOW 20 MIN: CPT | Performed by: PODIATRIST

## 2018-06-05 ASSESSMENT — PAIN SCALES - GENERAL: PAINLEVEL: NO PAIN (0)

## 2018-06-05 NOTE — MR AVS SNAPSHOT
"              After Visit Summary   6/5/2018    Pebbles Powell    MRN: [id number]           Patient Information     Date Of Birth          1940        Visit Information        Provider Department      6/5/2018 1:30 PM Hebert Dove DPM [facility]        Today's Diagnoses     Closed nondisplaced fracture of proximal phalanx of lesser toe of right foot, initial encounter    -  1      Care Instructions    Postoperative shoe weightbearing to tolerance follow-up in 4 weeks.          Follow-ups after your visit        Who to contact     If you have questions or need follow up information about today's clinic visit or your schedule please contact [facility] directly at [phone].  Normal or non-critical lab and imaging results will be communicated to you by TestQuesthart, letter or phone within 4 business days after the clinic has received the results. If you do not hear from us within 7 days, please contact the clinic through TestQuesthart or phone. If you have a critical or abnormal lab result, we will notify you by phone as soon as possible.  Submit refill requests through [facility] or call your pharmacy and they will forward the refill request to us. Please allow 3 business days for your refill to be completed.          Additional Information About Your Visit        MyChart Information     [facility] gives you secure access to your electronic health record. If you see a primary care provider, you can also send messages to your care team and make appointments. If you have questions, please call your primary care clinic.  If you do not have a primary care provider, please call [phone] and they will assist you.        Care EveryWhere ID     This is your Care EveryWhere ID. This could be used by other organizations to access your [place] medical records  [id number]        Your Vitals Were     Temperature Height BMI (Body Mass Index)             98.9  F (37.2  C) (Temporal) 5' 9.5\" " (1.765 m) 24.45 kg/m2          Blood Pressure from Last 3 Encounters:   06/05/18 100/50   05/18/18 102/60   03/07/18 124/60    Weight from Last 3 Encounters:   06/05/18 168 lb (76.2 kg)   05/18/18 172 lb 6.4 oz (78.2 kg)   03/07/18 166 lb (75.3 kg)              We Performed the Following     XR Foot Right G/E 3 Views        Primary Care Provider Office Phone # Fax #    Janiya Fraser [phone]       [address] [facility] [address]        Equal Access to Services     [facility] Center[facility] : Hadii janet Porras, wapatriciada blanca, qaybta kaalmada anita, corey claudio . So [facility] [phone].    ATENCIÓN: Si habla español, tiene a caal disposición servicios gratuitos de asistencia lingüística. [facility] [phone].    We comply with applicable federal civil rights laws and [place] laws. We do not discriminate on the basis of race, color, national origin, age, disability, sex, sexual orientation, or gender identity.            Thank you!     Thank you for choosing [facility]  for your care. Our goal is always to provide you with excellent care. Hearing back from our patients is one way we can continue to improve our services. Please take a few minutes to complete the written survey that you may receive in the mail after your visit with us. Thank you!             Your Updated Medication List - Protect others around you: Learn how to safely use, store and throw away your medicines at www.disposemymeds.org.          This list is accurate as of 6/5/18  2:35 PM.  Always use your most recent med list.                   Brand Name Dispense Instructions for use Diagnosis    acetaminophen 650 MG 8 hour tablet     100 tablet    Take 650 mg by mouth every 6 hours    Status post total right knee replacement       furosemide 20 MG tablet    LASIX    90 tablet    TAKE ONE TABLET BY MOUTH ONCE DAILY IN THE MORNING    Chronic diastolic congestive heart failure (H)        HYDROcodone-acetaminophen 5-325 MG per tablet    NORCO    10 tablet    Take 1 tablet by mouth every 8 hours as needed for pain    Acute left-sided thoracic back pain       levothyroxine 100 MCG tablet    SYNTHROID/LEVOTHROID    90 tablet    Take 1 tablet (100 mcg) by mouth daily    Hypothyroidism       Magnesium 500 MG Caps           multivitamin per tablet      1 TABLET DAILY brand varies per pt        order for DME     1 Device    Equipment being ordered: compression stockings, med strength    Unspecified venous (peripheral) insufficiency       VITAMIN B 12 PO      Take 2,500 mcg by mouth

## 2018-06-05 NOTE — LETTER
"    6/5/2018         RE: Pebbles Powell  [address] [address]        Dear Colleague,    Thank you for referring your patient, Pebbles Powell, to the [facility]. Please see a copy of my visit note below.    HPI:  Pebbles Powell is a 77 year old female who is seen in consultation at the request of self.    Pt presents for eval of:   (Onset, Location, L/R, Character, Treatments, Injury if yes)    XR Right foot today, 6/5/2018 5/28/2018, bumped Right 3rd toe into screen door, presents today with Right 3rd toe redness, swelling and dorsal Right foot swelling, no pain.  Compression socks    Retired.    BMI is normal.    EXAM:Vitals: /50 (BP Location: Right arm, Cuff Size: Adult Regular)  Temp 98.9  F (37.2  C) (Temporal)  Ht 5' 9.5\" (1.765 m)  Wt 168 lb (76.2 kg)  BMI 24.45 kg/m2  BMI= Body mass index is 24.45 kg/(m^2).    General appearance: Patient is alert and fully cooperative with history & exam.  No sign of distress is noted during the visit.     Psychiatric: Affect is pleasant & appropriate.  Patient appears motivated to improve health.     Respiratory: Breathing is regular & unlabored while sitting.     HEENT: Hearing is intact to spoken word.  Speech is clear.  No gross evidence of visual impairment that would impact ambulation.     Vascular: DP 1/4 & PT 1/4 left & right.  CFT delayed with dependent rubor noted about the digits.  Diminished hair growth distal to mid tibia and no hair about the foot and toes.  Temperature changes noted, warm to cool proximal to distal.  Hemosiderin pigmentation noted with multiple varicosities legs and feet bilateral. Generalized edema bilateral legs and feet.  Pt denies claudication history.     Neurologic: Normal plantar response bilateral.  Intact protective threshold plus 10/10 applications of a 5.07 monofilament.  Pt admits no burning and paraesthesias about the feet and toes with palpation.     Dermatologic:  " Diminished texture turgor and tone about the integument.  Skin is thin & shiny.  Pre ulcerative hyperkeratosis noted plantar right fifth metatarsal head.  No abscess or full thickness ulcerations noted.     Musculoskeletal: Patient is ambulatory without assistive device or brace.  There is semi reducible contracture of the lesser digits.  Considerable edema about the right third toe pain with palpation of the right third toe.  Negative drawer maneuver of the MPJ.    Radiographs: 3 views right foot demonstrate possible acute fracture of the lateral aspects of base of the proximal phalanx.    Creatinine (mg/dL)   Date Value   03/07/2018 1.63 (H)   11/10/2017 1.55 (H)   10/10/2017 1.63 (H)   08/14/2017 1.55 (H)   03/15/2017 1.50 (H)   10/05/2016 1.32 (H)       ASSESSMENT:       ICD-10-CM    1. Closed nondisplaced fracture of proximal phalanx of lesser toe of right foot, initial encounter S92.514A         PLAN:    1/23/2018  I sharply debrided 1 pre ulcerative hyperkeratotic lesions to the level of the dermis as noted above with a 15 blade.    We discussed risk factors and preventive measures.    We discussed appropriate hygiene, shoe gear, daily foot exam, and reinforced management of weight, diet, activity goals.   Dispensed written foot care instructions.    All questions were answered to their satisfaction.    RTC as needed with questions or concerns.    6/5/2018  Obtained and interpreted radiographs  Dispensed postoperative shoe compression dressing body dressing  Stay in the postoperative shoe until no pain or swelling then return to regular shoes as tolerated  Follow-up in 1 month with any continued concerns or questions  We will repeat imaging if still painful or swollen at that time      Hebert Dove DPM        Again, thank you for allowing me to participate in the care of your patient.        Sincerely,        Hebert Dove DPM

## 2018-06-05 NOTE — PROGRESS NOTES
"HPI:  Pebbles Powell is a 77 year old female who is seen in consultation at the request of self.    Pt presents for eval of:   (Onset, Location, L/R, Character, Treatments, Injury if yes)    XR Right foot today, 6/5/2018 5/28/2018, bumped Right 3rd toe into screen door, presents today with Right 3rd toe redness, swelling and dorsal Right foot swelling, no pain.  Compression socks    Retired.    BMI is normal.    EXAM:Vitals: /50 (BP Location: Right arm, Cuff Size: Adult Regular)  Temp 98.9  F (37.2  C) (Temporal)  Ht 5' 9.5\" (1.765 m)  Wt 168 lb (76.2 kg)  BMI 24.45 kg/m2  BMI= Body mass index is 24.45 kg/(m^2).    General appearance: Patient is alert and fully cooperative with history & exam.  No sign of distress is noted during the visit.     Psychiatric: Affect is pleasant & appropriate.  Patient appears motivated to improve health.     Respiratory: Breathing is regular & unlabored while sitting.     HEENT: Hearing is intact to spoken word.  Speech is clear.  No gross evidence of visual impairment that would impact ambulation.     Vascular: DP 1/4 & PT 1/4 left & right.  CFT delayed with dependent rubor noted about the digits.  Diminished hair growth distal to mid tibia and no hair about the foot and toes.  Temperature changes noted, warm to cool proximal to distal.  Hemosiderin pigmentation noted with multiple varicosities legs and feet bilateral. Generalized edema bilateral legs and feet.  Pt denies claudication history.     Neurologic: Normal plantar response bilateral.  Intact protective threshold plus 10/10 applications of a 5.07 monofilament.  Pt admits no burning and paraesthesias about the feet and toes with palpation.     Dermatologic:  Diminished texture turgor and tone about the integument.  Skin is thin & shiny.  Pre ulcerative hyperkeratosis noted plantar right fifth metatarsal head.  No abscess or full thickness ulcerations noted.     Musculoskeletal: Patient is ambulatory without " assistive device or brace.  There is semi reducible contracture of the lesser digits.  Considerable edema about the right third toe pain with palpation of the right third toe.  Negative drawer maneuver of the MPJ.    Radiographs: 3 views right foot demonstrate possible acute fracture of the lateral aspects of base of the proximal phalanx.    Creatinine (mg/dL)   Date Value   03/07/2018 1.63 (H)   11/10/2017 1.55 (H)   10/10/2017 1.63 (H)   08/14/2017 1.55 (H)   03/15/2017 1.50 (H)   10/05/2016 1.32 (H)       ASSESSMENT:       ICD-10-CM    1. Closed nondisplaced fracture of proximal phalanx of lesser toe of right foot, initial encounter S92.514A         PLAN:    1/23/2018  I sharply debrided 1 pre ulcerative hyperkeratotic lesions to the level of the dermis as noted above with a 15 blade.    We discussed risk factors and preventive measures.    We discussed appropriate hygiene, shoe gear, daily foot exam, and reinforced management of weight, diet, activity goals.   Dispensed written foot care instructions.    All questions were answered to their satisfaction.    RTC as needed with questions or concerns.    6/5/2018  Obtained and interpreted radiographs  Dispensed postoperative shoe compression dressing body dressing  Stay in the postoperative shoe until no pain or swelling then return to regular shoes as tolerated  Follow-up in 1 month with any continued concerns or questions  We will repeat imaging if still painful or swollen at that time      Hebert Dove DPM

## 2018-06-05 NOTE — NURSING NOTE
Dispensed 1 post op shoe, Size Men's Medium, with [facility] agreement signed by patient. Zully To CMA, June 5, 2018

## 2018-07-05 ENCOUNTER — OFFICE VISIT (OUTPATIENT)
Dept: FAMILY MEDICINE | Facility: OTHER | Age: 78
End: 2018-07-05
Payer: COMMERCIAL

## 2018-07-05 ENCOUNTER — TELEPHONE (OUTPATIENT)
Dept: FAMILY MEDICINE | Facility: OTHER | Age: 78
End: 2018-07-05

## 2018-07-05 VITALS
DIASTOLIC BLOOD PRESSURE: 66 MMHG | BODY MASS INDEX: 24.38 KG/M2 | OXYGEN SATURATION: 99 % | WEIGHT: 164.6 LBS | HEART RATE: 72 BPM | HEIGHT: 69 IN | SYSTOLIC BLOOD PRESSURE: 105 MMHG | TEMPERATURE: 98.3 F

## 2018-07-05 DIAGNOSIS — E03.9 HYPOTHYROIDISM, UNSPECIFIED TYPE: ICD-10-CM

## 2018-07-05 DIAGNOSIS — N30.00 ACUTE CYSTITIS WITHOUT HEMATURIA: ICD-10-CM

## 2018-07-05 DIAGNOSIS — E03.9 HYPOTHYROIDISM, UNSPECIFIED TYPE: Primary | ICD-10-CM

## 2018-07-05 DIAGNOSIS — N18.30 CKD (CHRONIC KIDNEY DISEASE) STAGE 3, GFR 30-59 ML/MIN (H): ICD-10-CM

## 2018-07-05 DIAGNOSIS — R55 FAINTING SPELL: Primary | ICD-10-CM

## 2018-07-05 DIAGNOSIS — D72.829 LEUKOCYTOSIS, UNSPECIFIED TYPE: ICD-10-CM

## 2018-07-05 DIAGNOSIS — D50.9 IRON DEFICIENCY ANEMIA, UNSPECIFIED IRON DEFICIENCY ANEMIA TYPE: ICD-10-CM

## 2018-07-05 DIAGNOSIS — D72.829 LEUKOCYTOSIS, UNSPECIFIED TYPE: Primary | ICD-10-CM

## 2018-07-05 LAB
ALBUMIN SERPL-MCNC: 3.4 G/DL (ref 3.4–5)
ALBUMIN UR-MCNC: NEGATIVE MG/DL
ALP SERPL-CCNC: 63 U/L (ref 40–150)
ALT SERPL W P-5'-P-CCNC: 18 U/L (ref 0–50)
ANION GAP SERPL CALCULATED.3IONS-SCNC: 10 MMOL/L (ref 3–14)
APPEARANCE UR: CLEAR
AST SERPL W P-5'-P-CCNC: 13 U/L (ref 0–45)
BACTERIA #/AREA URNS HPF: ABNORMAL /HPF
BASOPHILS # BLD AUTO: 0 10E9/L (ref 0–0.2)
BASOPHILS NFR BLD AUTO: 0 %
BILIRUB SERPL-MCNC: 0.5 MG/DL (ref 0.2–1.3)
BILIRUB UR QL STRIP: NEGATIVE
BUN SERPL-MCNC: 31 MG/DL (ref 7–30)
CALCIUM SERPL-MCNC: 9.2 MG/DL (ref 8.5–10.1)
CHLORIDE SERPL-SCNC: 106 MMOL/L (ref 94–109)
CO2 SERPL-SCNC: 25 MMOL/L (ref 20–32)
COLOR UR AUTO: YELLOW
CREAT SERPL-MCNC: 1.7 MG/DL (ref 0.52–1.04)
DIFFERENTIAL METHOD BLD: ABNORMAL
EOSINOPHIL # BLD AUTO: 0 10E9/L (ref 0–0.7)
EOSINOPHIL NFR BLD AUTO: 0.1 %
ERYTHROCYTE [DISTWIDTH] IN BLOOD BY AUTOMATED COUNT: 12.9 % (ref 10–15)
GFR SERPL CREATININE-BSD FRML MDRD: 29 ML/MIN/1.7M2
GLUCOSE SERPL-MCNC: 98 MG/DL (ref 70–99)
GLUCOSE UR STRIP-MCNC: NEGATIVE MG/DL
HCT VFR BLD AUTO: 32.6 % (ref 35–47)
HGB BLD-MCNC: 10.3 G/DL (ref 11.7–15.7)
HGB UR QL STRIP: NEGATIVE
KETONES UR STRIP-MCNC: NEGATIVE MG/DL
LEUKOCYTE ESTERASE UR QL STRIP: ABNORMAL
LYMPHOCYTES # BLD AUTO: 2.9 10E9/L (ref 0.8–5.3)
LYMPHOCYTES NFR BLD AUTO: 25.1 %
MCH RBC QN AUTO: 30.4 PG (ref 26.5–33)
MCHC RBC AUTO-ENTMCNC: 31.6 G/DL (ref 31.5–36.5)
MCV RBC AUTO: 96 FL (ref 78–100)
MONOCYTES # BLD AUTO: 0.9 10E9/L (ref 0–1.3)
MONOCYTES NFR BLD AUTO: 7.7 %
NEUTROPHILS # BLD AUTO: 7.7 10E9/L (ref 1.6–8.3)
NEUTROPHILS NFR BLD AUTO: 67.1 %
NITRATE UR QL: NEGATIVE
PH UR STRIP: 5 PH (ref 5–7)
PLATELET # BLD AUTO: 165 10E9/L (ref 150–450)
POTASSIUM SERPL-SCNC: 4.1 MMOL/L (ref 3.4–5.3)
PROT SERPL-MCNC: 6.8 G/DL (ref 6.8–8.8)
RBC # BLD AUTO: 3.39 10E12/L (ref 3.8–5.2)
RBC #/AREA URNS AUTO: ABNORMAL /HPF
SODIUM SERPL-SCNC: 141 MMOL/L (ref 133–144)
SOURCE: ABNORMAL
SP GR UR STRIP: <=1.005 (ref 1–1.03)
T4 FREE SERPL-MCNC: 1.28 NG/DL (ref 0.76–1.46)
TSH SERPL DL<=0.005 MIU/L-ACNC: 0.36 MU/L (ref 0.4–4)
UROBILINOGEN UR STRIP-ACNC: 0.2 EU/DL (ref 0.2–1)
WBC # BLD AUTO: 11.5 10E9/L (ref 4–11)
WBC #/AREA URNS AUTO: ABNORMAL /HPF

## 2018-07-05 PROCEDURE — 80053 COMPREHEN METABOLIC PANEL: CPT | Performed by: PHYSICIAN ASSISTANT

## 2018-07-05 PROCEDURE — 36415 COLL VENOUS BLD VENIPUNCTURE: CPT | Performed by: PHYSICIAN ASSISTANT

## 2018-07-05 PROCEDURE — 93000 ELECTROCARDIOGRAM COMPLETE: CPT | Performed by: PHYSICIAN ASSISTANT

## 2018-07-05 PROCEDURE — 84439 ASSAY OF FREE THYROXINE: CPT | Performed by: PHYSICIAN ASSISTANT

## 2018-07-05 PROCEDURE — 85025 COMPLETE CBC W/AUTO DIFF WBC: CPT | Performed by: PHYSICIAN ASSISTANT

## 2018-07-05 PROCEDURE — 81001 URINALYSIS AUTO W/SCOPE: CPT | Performed by: PHYSICIAN ASSISTANT

## 2018-07-05 PROCEDURE — 99214 OFFICE O/P EST MOD 30 MIN: CPT | Performed by: PHYSICIAN ASSISTANT

## 2018-07-05 PROCEDURE — 84443 ASSAY THYROID STIM HORMONE: CPT | Performed by: PHYSICIAN ASSISTANT

## 2018-07-05 RX ORDER — LEVOTHYROXINE SODIUM 88 UG/1
88 TABLET ORAL DAILY
Qty: 90 TABLET | Refills: 1 | Status: SHIPPED | OUTPATIENT
Start: 2018-07-05 | End: 2019-02-10

## 2018-07-05 RX ORDER — CIPROFLOXACIN 250 MG/1
250 TABLET, FILM COATED ORAL 2 TIMES DAILY
Qty: 6 TABLET | Refills: 0 | Status: SHIPPED | OUTPATIENT
Start: 2018-07-05 | End: 2018-07-15

## 2018-07-05 NOTE — TELEPHONE ENCOUNTER
Pebbles Powell is a 77 year old female who presents with concerns that she passed out yesterday 07/04/2018.    NURSING ASSESSMENT:  Description:  Yesterday morning felt sick like she might vomit after doing her morning route when getting ready to make coffee and breakfast. Then got nauseous, shaky and then passed out. [place] like her limbs couldn't hold her anymore. Was shaking, freezing cold and unable to move. Was unable to get off the floor for about 5 minutes. Had difficulty getting warm. Ended up putting on a sweater and pricila, and laid in bed till she felt better. Felt better after an hour. Was very scared. Has lost 3 pounds in the past week. Always has diarrhea, wears a pad incase of incontinence. Denies history of seizures, fevers, urination changes.    Onset/duration:  yesterday  Precip. factors:  Unknown   Associated symptoms:  Passed out yesterday  Improves/worsens symptoms:  Same   Pain scale (0-10)   0/10  LMP/preg/breast feeding:  No LMP recorded. Patient has had a hysterectomy.  Last exam/Treatment:  5/18/2018  Allergies:   Allergies   Allergen Reactions     No Known Drug Allergies      NURSING PLAN: Huddle with provider, plan includes JM to see patient, complete vitals, EKG and discuss labs    RECOMMENDED DISPOSITION:  See above  Will comply with recommendation: Yes  If further questions/concerns or if symptoms do not improve, worsen or new symptoms develop, call your PCP or [facility] as soon as possible.    NOTES:  Disposition was determined by the first positive assessment question, therefore all previous assessment questions were negative    Guideline used:  Telephone Triage Protocols for Nurses, Fifth Edition, Rosa Carias  Nursing Judgement  Huddled with KELLY Shaver, RN, BSN

## 2018-07-05 NOTE — PROGRESS NOTES
SUBJECTIVE:   Pebbles Powell is a 77 year old female who presents to clinic today for the following health issues:      HPI  Patient has a history of mild CHF, iron deficiency anemia and hypothyroidism. Yesterday morning, she went into the bathroom before breakfast and was standing at the sink and felt sick like she might vomit. The nausea worsened and then she became shaky and weak and fell to the ground. She is unsure if she lost consciousness  She states she was shaky and very cold and was able to scoot herself into a chair and call a friend to come over. She was having trouble warming up so put a sweater and blanket on and symptoms finally resolved after 1 hour.  She denies associated confusion, unilateral weakness, facial droop, speech changes fevers, or headaches. Denies history of seizures. No recent urinary frequency or dysuria. She has chronic loose stools without any recent change. She tries to drink 3, 16 oz bottles of water daily but states she has not been eating as much due recently due to the weather. She denies chest pain, shortness of breath, leg swelling, or weight gain. She states she feels back to normal today.     Problem list and histories reviewed & adjusted, as indicated.  Additional history: as documented    ROS:  GENERAL: Denies fever, fatigue, weakness, weight gain, or weight loss.  CARDIOVASCULAR: Denies chest pain, shortness of breath, irregular heartbeats, palpitations, or edema.  RESPIRATORY: Denies cough, hemoptysis, and shortness of breath.  GASTROINTESTINAL: Denies nausea, vomiting, change in appetite, abdominal pain, diarrhea, or constipation.  GENITOURINARY: Denies increased frequency, urgency, dysuria, hematuria, or incontinence.   NEUROLOGIC: +Syncopal episode yesterday. Denies headache, fainting, memory loss, numbness, tingling, or seizures.  PSYCHIATRIC: Denies depression, anxiety, mood swings, and thoughts of suicide.    OBJECTIVE:     /66  Pulse 72  Temp 98.3  F  "(36.8  C) (Oral)  Ht 5' 9.37\" (1.762 m)  Wt 164 lb 9.6 oz (74.7 kg)  SpO2 99%  Breastfeeding? No  BMI 24.05 kg/m2  Body mass index is 24.05 kg/(m^2).  GENERAL: healthy, alert and no distress  RESP: lungs clear to auscultation - no rales, rhonchi or wheezes  CV: regular rate and rhythm, normal S1 S2, no S3 or S4, no murmur, click or rub, no peripheral edema  ABDOMEN: soft, nontender, no hepatosplenomegaly, no masses and bowel sounds normal  NEURO: Normal strength and tone, mentation intact and speech normal. Cranial nerves II-XII are grossly intact. DTRs are 2+/4 throughout and symmetric. Gait is stable.   PSYCH: mentation appears normal, affect normal/bright    Diagnostic Test Results:    EKG: Normal sinus rhythm, no acute ST/T wave changes, no LVH by voltage criteria, unchanged from previous tracings.    Results for orders placed or performed in visit on 07/05/18   CBC with platelets and differential   Result Value Ref Range    WBC 11.5 (H) 4.0 - 11.0 10e9/L    RBC Count 3.39 (L) 3.8 - 5.2 10e12/L    Hemoglobin 10.3 (L) 11.7 - 15.7 g/dL    Hematocrit 32.6 (L) 35.0 - 47.0 %    MCV 96 78 - 100 fl    MCH 30.4 26.5 - 33.0 pg    MCHC 31.6 31.5 - 36.5 g/dL    RDW 12.9 10.0 - 15.0 %    Platelet Count 165 150 - 450 10e9/L    Diff Method Automated Method     % Neutrophils 67.1 %    % Lymphocytes 25.1 %    % Monocytes 7.7 %    % Eosinophils 0.1 %    % Basophils 0.0 %    Absolute Neutrophil 7.7 1.6 - 8.3 10e9/L    Absolute Lymphocytes 2.9 0.8 - 5.3 10e9/L    Absolute Monocytes 0.9 0.0 - 1.3 10e9/L    Absolute Eosinophils 0.0 0.0 - 0.7 10e9/L    Absolute Basophils 0.0 0.0 - 0.2 10e9/L       ASSESSMENT/PLAN:       ICD-10-CM    1. Fainting spell R55 EKG 12-lead complete w/read - Clinics     CBC with platelets and differential     Comprehensive metabolic panel (BMP + Alb, Alk Phos, ALT, AST, Total. Bili, TP)     TSH with free T4 reflex   2. Acute cystitis without hematuria N30.00    3. CKD (chronic kidney disease) stage 3, " GFR 30-59 ml/min N18.3 CBC with platelets and differential     Comprehensive metabolic panel (BMP + Alb, Alk Phos, ALT, AST, Total. Bili, TP)   4. Hypothyroidism, unspecified type E03.9 TSH with free T4 reflex   5. Iron deficiency anemia, unspecified iron deficiency anemia type D50.9          EKG and neurological examination completely normal. No recent weight gain or any significant edema on exam. She feels back to normal today. CMP pending but CBC slightly elevated WBC without left shift with minimally decreased hemoglobin at 10.3, down from 11.0 compared to 3 months ago. Will have her complete a UA to rule out infection, she will bring this in later since she just urinated before coming in. Symptoms are consistent with a vasovagal syncopal episode, likely secondary to dehydration. I recommend she increase her water intake to at least 4, 16 oz water bottles daily and to make sure she is eating at least 3 meals per day. BP is stable and is chronically at the lower end of normal. Will continue current dose of Lasix 20 mg daily for now but I recommend follow up with PCP in 4-6 weeks for recheck. If symptom recur, she was encouraged to be seen right away.     Addendum: UA came back with moderate bacteria and positive leukocyte esterase so will treat with cipro BID x 3 days. I told her that this very well may have contributed to her syncopal episode even though she is otherwise asymptomatic. She will drink plenty of fluids and if symptoms return, she will be seen again.     Greater than 50% of 25 minute visit spent on counseling and plan of care regarding the above.     Dominic Quiles PA-C  [facility]

## 2018-07-05 NOTE — TELEPHONE ENCOUNTER
----- Message from Dominic Quiles PA-C sent at 7/5/2018  3:53 PM CDT -----  Please call and notify patient that her kidney function is overall stable but her thyroid is getting slightly over-replaced so I recommend decreasing the levothyroxine to 88 mcg daily and repeating labs in 2 months. New Rx sent. Thanks!    Dominic Quiles PA-C

## 2018-07-05 NOTE — MR AVS SNAPSHOT
After Visit Summary   7/5/2018    Pebbles Powell    MRN: [id number]           Patient Information     Date Of Birth          1940        Visit Information        Provider Department      7/5/2018 10:00 AM Dominic Quiles PA-C [facility]        Today's Diagnoses     Fainting spell    -  1    CKD (chronic kidney disease) stage 3, GFR 30-59 ml/min        Hypothyroidism, unspecified type        Iron deficiency anemia, unspecified iron deficiency anemia type          Care Instructions    You likely had a fainting episode due to dehydration.  I recommend you drink 4 of those 16 oz water bottles daily to stay hydrated.  Try to eat 3 meals per day.  Will check updated labs today.  If symptoms occur again, you should be seen right away.  I recommend follow up in 4-6 weeks with Dr. Fraser for follow up.           Follow-ups after your visit        Who to contact     If you have questions or need follow up information about today's clinic visit or your schedule please contact [facility] directly at [phone].  Normal or non-critical lab and imaging results will be communicated to you by [employer], letter or phone within 4 business days after the clinic has received the results. If you do not hear from us within 7 days, please contact the clinic through [employer] or phone. If you have a critical or abnormal lab result, we will notify you by phone as soon as possible.  Submit refill requests through [employer] or call your pharmacy and they will forward the refill request to us. Please allow 3 business days for your refill to be completed.          Additional Information About Your Visit        [employer] Information     [employer] gives you secure access to your electronic health record. If you see a primary care provider, you can also send messages to your care team and make appointments. If you have questions, please call your primary care clinic.  If you do not have a primary  "care provider, please call [phone] and they will assist you.        Care EveryWhere ID     This is your Care EveryWhere ID. This could be used by other organizations to access your [facility] medical records  [id number]        Your Vitals Were     Pulse Temperature Height Pulse Oximetry Breastfeeding? BMI (Body Mass Index)    72 98.3  F (36.8  C) (Oral) 5' 9.37\" (1.762 m) 99% No 24.05 kg/m2       Blood Pressure from Last 3 Encounters:   07/05/18 105/66   06/05/18 100/50   05/18/18 102/60    Weight from Last 3 Encounters:   07/05/18 164 lb 9.6 oz (74.7 kg)   06/05/18 168 lb (76.2 kg)   05/18/18 172 lb 6.4 oz (78.2 kg)              We Performed the Following     CBC with platelets and differential     Comprehensive metabolic panel (BMP + Alb, Alk Phos, ALT, AST, Total. Bili, TP)     EKG 12-lead complete w/read - Clinics     TSH with free T4 reflex        Primary Care Provider Office Phone # Fax #    Janiya Yady Fraser [phone]       [address] [address] [address]        Equal Access to Services     MICHAEL LARES : Hadii janet huynho Soelia, wapatriciada stevenadaha, qaybta kaalmada anita, corey herr. So [facility] [phone].    ATENCIÓN: Si habla español, tiene a caal disposición servicios gratuitos de asistencia lingüística. Llame al [phone].    We comply with applicable federal civil rights laws and [place] laws. We do not discriminate on the basis of race, color, national origin, age, disability, sex, sexual orientation, or gender identity.            Thank you!     Thank you for choosing [facility]  for your care. Our goal is always to provide you with excellent care. Hearing back from our patients is one way we can continue to improve our services. Please take a few minutes to complete the written survey that you may receive in the mail after your visit with us. Thank you!             Your Updated Medication List - Protect others around " you: Learn how to safely use, store and throw away your medicines at www.disposemymeds.org.          This list is accurate as of 7/5/18 10:51 AM.  Always use your most recent med list.                   Brand Name Dispense Instructions for use Diagnosis    acetaminophen 650 MG 8 hour tablet     100 tablet    Take 650 mg by mouth every 6 hours    Status post total right knee replacement       biotin 2.5 mg/mL Susp      Take by mouth daily        furosemide 20 MG tablet    LASIX    90 tablet    TAKE ONE TABLET BY MOUTH ONCE DAILY IN THE MORNING    Chronic diastolic congestive heart failure (H)       HYDROcodone-acetaminophen 5-325 MG per tablet    NORCO    10 tablet    Take 1 tablet by mouth every 8 hours as needed for pain    Acute left-sided thoracic back pain       levothyroxine 100 MCG tablet    SYNTHROID/LEVOTHROID    90 tablet    Take 1 tablet (100 mcg) by mouth daily    Hypothyroidism       Magnesium 500 MG Caps           multivitamin per tablet      1 TABLET DAILY brand varies per pt        order for DME     1 Device    Equipment being ordered: compression stockings, med strength    Unspecified venous (peripheral) insufficiency       VITAMIN B 12 PO      Take 2,500 mcg by mouth

## 2018-07-15 ENCOUNTER — OFFICE VISIT (OUTPATIENT)
Dept: URGENT CARE | Facility: RETAIL CLINIC | Age: 78
End: 2018-07-15
Payer: COMMERCIAL

## 2018-07-15 VITALS — DIASTOLIC BLOOD PRESSURE: 65 MMHG | HEART RATE: 67 BPM | TEMPERATURE: 97.5 F | SYSTOLIC BLOOD PRESSURE: 106 MMHG

## 2018-07-15 DIAGNOSIS — H01.9 INFLAMMATION OF EYELID, LEFT: Primary | ICD-10-CM

## 2018-07-15 PROCEDURE — 99203 OFFICE O/P NEW LOW 30 MIN: CPT | Performed by: PHYSICIAN ASSISTANT

## 2018-07-15 RX ORDER — LEVOTHYROXINE SODIUM 100 UG/1
TABLET ORAL
COMMUNITY
Start: 2018-05-18 | End: 2019-03-13

## 2018-07-15 NOTE — MR AVS SNAPSHOT
After Visit Summary   7/15/2018    Pebbles Powell    MRN: [id number]           Patient Information     Date Of Birth          1940        Visit Information        Provider Department      7/15/2018 11:30 AM Waleska Patterson PA-C [facility]        Today's Diagnoses     Inflammation of eyelid, left    -  1      Care Instructions    Warm compresses, at least 4 times daily for 15 minutes each time.    If this area continues to enlarge over the next couple days or does not reduce in one to two weeks, you should be seen by ophthalmologist for consideration of incision and curettage.  Watch for signs - no eye muscle movement, severe swelling, and high fever 102+ - if these occur, be seen in ER immediately.  Please follow up with primary care provider if not improving, worsening or new symptoms or for any adverse reactions to medications.           Follow-ups after your visit        Who to contact     You can reach your care team any time of the day by calling [phone].  Notification of test results:  If you have an abnormal lab result, we will notify you by phone as soon as possible.         Additional Information About Your Visit        MyChart Information     [employer] gives you secure access to your electronic health record. If you see a primary care provider, you can also send messages to your care team and make appointments. If you have questions, please call your primary care clinic.  If you do not have a primary care provider, please call [phone] and they will assist you.        Care EveryWhere ID     This is your Care EveryWhere ID. This could be used by other organizations to access your [place] medical records  [id number]        Your Vitals Were     Pulse Temperature                67 97.5  F (36.4  C) (Tympanic)           Blood Pressure from Last 3 Encounters:   07/15/18 106/65   07/05/18 105/66   06/05/18 100/50    Weight from Last 3 Encounters:    07/05/18 164 lb 9.6 oz (74.7 kg)   06/05/18 168 lb (76.2 kg)   05/18/18 172 lb 6.4 oz (78.2 kg)              Today, you had the following     No orders found for display       Primary Care Provider Office Phone # Fax #    Janiya Yady Fraser [phone]       [address] [facility] [address]        Equal Access to Services     MICHAEL LARES : Hadii aad ku hadasho Soomaali, waaxda luqadaha, qaybta kaalmada adeegyada, waxay idiin hayaan adeeg francesca claudio . So [facility] [phone].    ATENCIÓN: Si habla espronni, tiene a caal disposición servicios gratuitos de asistencia lingüística. Llame al [phone].    We comply with applicable federal civil rights laws and [place] laws. We do not discriminate on the basis of race, color, national origin, age, disability, sex, sexual orientation, or gender identity.            Thank you!     Thank you for choosing [facility]  for your care. Our goal is always to provide you with excellent care. Hearing back from our patients is one way we can continue to improve our services. Please take a few minutes to complete the written survey that you may receive in the mail after your visit with us. Thank you!             Your Updated Medication List - Protect others around you: Learn how to safely use, store and throw away your medicines at www.disposemymeds.org.          This list is accurate as of 7/15/18  1:00 PM.  Always use your most recent med list.                   Brand Name Dispense Instructions for use Diagnosis    acetaminophen 650 MG 8 hour tablet     100 tablet    Take 650 mg by mouth every 6 hours    Status post total right knee replacement       biotin 2.5 mg/mL Susp      Take by mouth daily        furosemide 20 MG tablet    LASIX    90 tablet    TAKE ONE TABLET BY MOUTH ONCE DAILY IN THE MORNING    Chronic diastolic congestive heart failure (H)       HYDROcodone-acetaminophen 5-325 MG per tablet    NORCO    10 tablet    Take 1 tablet  by mouth every 8 hours as needed for pain    Acute left-sided thoracic back pain       * levothyroxine 100 MCG tablet    SYNTHROID/LEVOTHROID          * levothyroxine 88 MCG tablet    SYNTHROID/LEVOTHROID    90 tablet    Take 1 tablet (88 mcg) by mouth daily    Hypothyroidism, unspecified type       Magnesium 500 MG Caps           multivitamin per tablet      1 TABLET DAILY brand varies per pt        order for DME     1 Device    Equipment being ordered: compression stockings, med strength    Unspecified venous (peripheral) insufficiency       VITAMIN B 12 PO      Take 2,500 mcg by mouth        * Notice:  This list has 2 medication(s) that are the same as other medications prescribed for you. Read the directions carefully, and ask your doctor or other care provider to review them with you.

## 2018-07-15 NOTE — PROGRESS NOTES
Chief Complaint   Patient presents with     Eye Problem     left eyelid pink, water, swelling went down after putting a cold pack on in this morning, no fevers, eye itches, no eye pain      SUBJECTIVE:  Pebbles Powell is a 77 year old female who presents complaining of mild left eye swelling for 1 day(s).   Onset/timing: gradual.    Associated Signs and Symptoms: eye had small amt of drainage, lid pink  Eye has not been red. No thick drainage or matter. No eye pain. Lid has been slightly itchy.  Treatment measures tried include: cold compress seemed to help  No new eye makeup , no recent illness  Contact wearer : No   PMK - hypothyroid, CKD    Past Medical History:   Diagnosis Date     CKD (chronic kidney disease)      PONV (postoperative nausea and vomiting)     usually has N&V with anesthesia     Porokeratosis      Renal disease      Sleep apnea      Unspecified hypothyroidism      Current Outpatient Prescriptions   Medication Sig Dispense Refill     acetaminophen 650 MG TABS Take 650 mg by mouth every 6 hours 100 tablet 1     biotin 2.5 mg/mL SUSP Take by mouth daily       Cyanocobalamin (VITAMIN B 12 PO) Take 2,500 mcg by mouth       furosemide (LASIX) 20 MG tablet TAKE ONE TABLET BY MOUTH ONCE DAILY IN THE MORNING 90 tablet 3     levothyroxine (SYNTHROID/LEVOTHROID) 88 MCG tablet Take 1 tablet (88 mcg) by mouth daily 90 tablet 1     MULTIVITAMINS OR TABS 1 TABLET DAILY brand varies per pt       HYDROcodone-acetaminophen (NORCO) 5-325 MG per tablet Take 1 tablet by mouth every 8 hours as needed for pain (Patient not taking: Reported on 7/15/2018) 10 tablet 0     levothyroxine (SYNTHROID/LEVOTHROID) 100 MCG tablet        Magnesium 500 MG CAPS        ORDER FOR DME Equipment being ordered: compression stockings, med strength (Patient not taking: Reported on 7/15/2018) 1 Device prn        Allergies   Allergen Reactions     No Known Drug Allergies         History   Smoking Status     Former Smoker     Packs/day:  1.50     Years: 50.00     Types: Cigarettes     Quit date: 8/20/2007   Smokeless Tobacco     Never Used       ROS:  CONSTITUTIONAL:NEGATIVE for fever, chills  ENT/MOUTH: NEGATIVE for ear, mouth and throat problems    OBJECTIVE:  /65 (BP Location: Left arm)  Pulse 67  Temp 97.5  F (36.4  C) (Tympanic)  General: no acute distress  Eye exam: PERRL, Left upper eyelid - pink, slightly swollen, no warmth, no fluctuance, no vesicles, no flaky, no lump, no rash. Conjunctiva clear, no drainage.  R eye - conjunctiva clear, no drainage, lid normal.  No swelling around eye.    ASSESSMENT:  (H01.9) Inflammation of eyelid, left  (primary encounter diagnosis)    PLAN:  Compresses at least 4 times daily for 15 minutes each time.    Topical over the counter cream to help itch as needed  Please follow up with primary care provider if not improving, worsening or new symptoms     Waleska Patterson PA-C  [facility]

## 2018-07-15 NOTE — PATIENT INSTRUCTIONS
Warm compresses, at least 4 times daily for 15 minutes each time.    If this area continues to enlarge over the next couple days or does not reduce in one to two weeks, you should be seen by ophthalmologist for consideration of incision and curettage.  Watch for signs - no eye muscle movement, severe swelling, and high fever 102+ - if these occur, be seen in ER immediately.  Please follow up with primary care provider if not improving, worsening or new symptoms or for any adverse reactions to medications.

## 2018-07-30 ENCOUNTER — TELEPHONE (OUTPATIENT)
Dept: FAMILY MEDICINE | Facility: OTHER | Age: 78
End: 2018-07-30

## 2018-07-30 ENCOUNTER — OFFICE VISIT (OUTPATIENT)
Dept: FAMILY MEDICINE | Facility: CLINIC | Age: 78
End: 2018-07-30
Payer: COMMERCIAL

## 2018-07-30 VITALS
HEIGHT: 69 IN | OXYGEN SATURATION: 98 % | SYSTOLIC BLOOD PRESSURE: 106 MMHG | DIASTOLIC BLOOD PRESSURE: 58 MMHG | BODY MASS INDEX: 24.88 KG/M2 | WEIGHT: 168 LBS | RESPIRATION RATE: 14 BRPM | HEART RATE: 79 BPM | TEMPERATURE: 98 F

## 2018-07-30 DIAGNOSIS — F43.9 SITUATIONAL STRESS: Primary | ICD-10-CM

## 2018-07-30 PROCEDURE — 99213 OFFICE O/P EST LOW 20 MIN: CPT | Performed by: PHYSICIAN ASSISTANT

## 2018-07-30 ASSESSMENT — ANXIETY QUESTIONNAIRES
3. WORRYING TOO MUCH ABOUT DIFFERENT THINGS: SEVERAL DAYS
2. NOT BEING ABLE TO STOP OR CONTROL WORRYING: SEVERAL DAYS
7. FEELING AFRAID AS IF SOMETHING AWFUL MIGHT HAPPEN: NOT AT ALL
GAD7 TOTAL SCORE: 3
5. BEING SO RESTLESS THAT IT IS HARD TO SIT STILL: NOT AT ALL
1. FEELING NERVOUS, ANXIOUS, OR ON EDGE: SEVERAL DAYS
IF YOU CHECKED OFF ANY PROBLEMS ON THIS QUESTIONNAIRE, HOW DIFFICULT HAVE THESE PROBLEMS MADE IT FOR YOU TO DO YOUR WORK, TAKE CARE OF THINGS AT HOME, OR GET ALONG WITH OTHER PEOPLE: SOMEWHAT DIFFICULT
6. BECOMING EASILY ANNOYED OR IRRITABLE: NOT AT ALL

## 2018-07-30 ASSESSMENT — PATIENT HEALTH QUESTIONNAIRE - PHQ9: 5. POOR APPETITE OR OVEREATING: NOT AT ALL

## 2018-07-30 ASSESSMENT — PAIN SCALES - GENERAL: PAINLEVEL: NO PAIN (0)

## 2018-07-30 NOTE — MR AVS SNAPSHOT
"              After Visit Summary   7/30/2018    Pebbles Powell    MRN: [id number]           Patient Information     Date Of Birth          1940        Visit Information        Provider Department      7/30/2018 1:20 PM Kourtney Lamb PA-C [facility]        Today's Diagnoses     Situational stress    -  1       Follow-ups after your visit        Who to contact     If you have questions or need follow up information about today's clinic visit or your schedule please contact [facility] directly at [phone].  Normal or non-critical lab and imaging results will be communicated to you by [employer], letter or phone within 4 business days after the clinic has received the results. If you do not hear from us within 7 days, please contact the clinic through [employer] or phone. If you have a critical or abnormal lab result, we will notify you by phone as soon as possible.  Submit refill requests through [employer] or call your pharmacy and they will forward the refill request to us. Please allow 3 business days for your refill to be completed.          Additional Information About Your Visit        MyChart Information     [employer] gives you secure access to your electronic health record. If you see a primary care provider, you can also send messages to your care team and make appointments. If you have questions, please call your primary care clinic.  If you do not have a primary care provider, please call [phone] and they will assist you.        Care EveryWhere ID     This is your Care EveryWhere ID. This could be used by other organizations to access your [place] medical records  [id number]        Your Vitals Were     Pulse Temperature Respirations Height Pulse Oximetry BMI (Body Mass Index)    79 98  F (36.7  C) (Temporal) 14 5' 9.37\" (1.762 m) 98% 24.55 kg/m2       Blood Pressure from Last 3 Encounters:   07/30/18 106/58   07/15/18 106/65   07/05/18 105/66    Weight from Last 3 " Encounters:   07/30/18 168 lb (76.2 kg)   07/05/18 164 lb 9.6 oz (74.7 kg)   06/05/18 168 lb (76.2 kg)              Today, you had the following     No orders found for display       Primary Care Provider Office Phone # Fax #    Janiya Yady Fraser [phone]       [address] [facility] [address]        Equal Access to Services     MICHAEL LARES : Hadii aad ku hadasho Soomaali, waaxda luqadaha, qaybta kaalmada adeegyada, waxay maxwellin hayaan clayton macristinabuffy claudio . So [facility] [phone].    ATENCIÓN: Si habla español, tiene a caal disposición servicios gratuitos de asistencia lingüística. Llame al [phone].    We comply with applicable federal civil rights laws and [place] laws. We do not discriminate on the basis of race, color, national origin, age, disability, sex, sexual orientation, or gender identity.            Thank you!     Thank you for choosing [facility]  for your care. Our goal is always to provide you with excellent care. Hearing back from our patients is one way we can continue to improve our services. Please take a few minutes to complete the written survey that you may receive in the mail after your visit with us. Thank you!             Your Updated Medication List - Protect others around you: Learn how to safely use, store and throw away your medicines at www.disposemymeds.org.          This list is accurate as of 7/30/18  5:26 PM.  Always use your most recent med list.                   Brand Name Dispense Instructions for use Diagnosis    acetaminophen 650 MG 8 hour tablet     100 tablet    Take 650 mg by mouth every 6 hours    Status post total right knee replacement       biotin 2.5 mg/mL Susp      Take by mouth daily        furosemide 20 MG tablet    LASIX    90 tablet    TAKE ONE TABLET BY MOUTH ONCE DAILY IN THE MORNING    Chronic diastolic congestive heart failure (H)       HYDROcodone-acetaminophen 5-325 MG per tablet    NORCO    10 tablet    Take 1 tablet by  mouth every 8 hours as needed for pain    Acute left-sided thoracic back pain       * levothyroxine 100 MCG tablet    SYNTHROID/LEVOTHROID          * levothyroxine 88 MCG tablet    SYNTHROID/LEVOTHROID    90 tablet    Take 1 tablet (88 mcg) by mouth daily    Hypothyroidism, unspecified type       Magnesium 500 MG Caps           multivitamin per tablet      1 TABLET DAILY brand varies per pt        order for DME     1 Device    Equipment being ordered: compression stockings, med strength    Unspecified venous (peripheral) insufficiency       VITAMIN B 12 PO      Take 2,500 mcg by mouth        * Notice:  This list has 2 medication(s) that are the same as other medications prescribed for you. Read the directions carefully, and ask your doctor or other care provider to review them with you.

## 2018-07-30 NOTE — TELEPHONE ENCOUNTER
"Pebbles Powell is a 77 year old female who presents with \"private personal\" concerns.    NURSING ASSESSMENT:  Description:  Stated having issues getting her cat into the carrier to go to the vet. Stated friends have informed her its not the cat, its the patient feeling stressed and anxious around this issue. Patient stated her heart pounds when she needs to get the cat into the carrier. Requesting a low dose of valium to take prior to getting the cat into the carrier. RN discussed she would need to see a provider to discuss stress/anxiety around this situation and possible medication options if they feel are necessary.   Onset/duration:  Ongoing   Precip. factors:  Cat will not get in carrier   Associated symptoms:  Stressed, heart pounding   Improves/worsens symptoms:  Same   Pain scale (0-10)   0/10  LMP/preg/breast feeding:  No LMP recorded. Patient has had a hysterectomy.  Last exam/Treatment:  07/15/2018  Allergies:   Allergies   Allergen Reactions     No Known Drug Allergies      NURSING PLAN: Nursing advice to patient would need to see a provider    RECOMMENDED DISPOSITION:  scheduled  Will comply with recommendation: Yes  If further questions/concerns or if symptoms do not improve, worsen or new symptoms develop, call your PCP or [facility] [place] [facility] as soon as possible.    NOTES:  Disposition was determined by the first positive assessment question, therefore all previous assessment questions were negative    Guideline used:  Nursing Judgment    Daniella Shaver RN, BSN     "

## 2018-07-30 NOTE — PROGRESS NOTES
"  SUBJECTIVE:   Pebbles Powell is a 77 year old female who presents to clinic today for the following health issues:      HPI   PER TRIAGE note:  Conversation: [facility]   (Newest Message First)   Daniella Shaver RN   7/30/18 11:00 AM   Note      Pebbles Powell is a 77 year old female who presents with \"private personal\" concerns.     NURSING ASSESSMENT:  Description:  Stated having issues getting her cat into the carrier to go to the vet. Stated friends have informed her its not the cat, its the patient feeling stressed and anxious around this issue. Patient stated her heart pounds when she needs to get the cat into the carrier. Requesting a low dose of valium to take prior to getting the cat into the carrier. RN discussed she would need to see a provider to discuss stress/anxiety around this situation and possible medication options if they feel are necessary.   Onset/duration:  Ongoing   Precip. factors:  Cat will not get in carrier   Associated symptoms:  Stressed, heart pounding   Improves/worsens symptoms:  Same   Pain scale (0-10)   0/10  LMP/preg/breast feeding:  No LMP recorded. Patient has had a hysterectomy.  Last exam/Treatment:  07/15/2018  Allergies:        Allergies   Allergen Reactions     No Known Drug Allergies        NURSING PLAN: Nursing advice to patient would need to see a provider     RECOMMENDED DISPOSITION:  scheduled  Will comply with recommendation: Yes  If further questions/concerns or if symptoms do not improve, worsen or new symptoms develop, call your PCP or [place] [facility] as soon as possible.     NOTES:  Disposition was determined by the first positive assessment question, therefore all previous assessment questions were negative     Guideline used:  Nursing Judgment           Got cat from rescue 6months ago.  Love my cat she sleeps with me but will not get in the car carrier.   Pt feels stressed out when trying to get cat in the carried. Heart pounds when she gets " "frustrated.   Thinks cat can tell she is worked up. \"She can sense it, so she won't get in\".   I need something to calm me down so I can get my cat in the carrier.   Has neighbors- 2 good friends. Has asked for help but the cat hides.   Has tried to lure her in with toys.  Has not called the vet for tips.   Looked on internet to see if someone would come to her house to care for the cat.   \"I just need a valium or 2\".     Anxiety Follow-Up    Status since last visit: related to cat.     Other associated symptoms: no panic attacks , no signs of Depression     Complicating factors:   Significant life event: Yes-  Difficulty getting cat into carrier to go to the    Current substance abuse: None  Depression symptoms: No  OFE-7 SCORE 2/24/2016 3/7/2018 5/21/2018   Total Score 0 2 0   OFE-7    Problem list and histories reviewed & adjusted, as indicated.  Additional history: as documented    Patient Active Problem List   Diagnosis     CHF (Congestive Heart Failure), diastolic dysfunction     CKD (chronic kidney disease) stage 3, GFR 30-59 ml/min     Pernicious anemia     Iron deficiency anemia     BCC (basal cell carcinoma), right lower leg     Vulvar itching     Mammogram declined     Moderate major depression (H)     Advance care planning     Total knee replacement status     Hypothyroidism     History of basal cell carcinoma     Porokeratosis     Anemia secondary to renal failure     Past Surgical History:   Procedure Laterality Date     APPENDECTOMY       ARTHROPLASTY KNEE Right 7/13/2015    Procedure: ARTHROPLASTY KNEE;  Surgeon: Santino Storm MD;  Location:  OR     ARTHROSCOPY KNEE  11/23/2010    ARTHROSCOPY KNEE performed by BROOKLYNN PULIDO at  OR     C ANTER VESICOURETHROPEXY,SIMPLE       COLONOSCOPY  6/1/2011    Procedure:COMBINED COLONOSCOPY, SINGLE BIOPSY/POLYPECTOMY BY BIOPSY; Surgeon:JEANNETTE WYNNE; Location: GI     COLPORRHAPHY ANTERIOR       DILATION AND CURETTAGE       ENT " SURGERY      T&A     ESOPHAGOSCOPY, GASTROSCOPY, DUODENOSCOPY (EGD), COMBINED  6/1/2011    Procedure:COMBINED ESOPHAGOSCOPY, GASTROSCOPY, DUODENOSCOPY (EGD), BIOPSY SINGLE OR MULTIPLE; MULTIPLE; Surgeon:JEANNETTE WYNNE; Location:[facility] GI     EXCISE LESION LOWER EXTREMITY  7/18/2014    Procedure: EXCISE LESION LOWER EXTREMITY;  Surgeon: Santos Roberson MD;  Location: [facility] OR     HEMORRHOIDECTOMY       HYSTERECTOMY, GABRIEL      fibroids     LAPAROTOMY, TUMOR DEBULKING, COMBINED  2/9/2012    Procedure:COMBINED LAPAROTOMY, TUMOR DEBULKING; Exploratory Laparotomy,  Bilateral Salpingo Oophorectomy; Surgeon:MIKIE LU; Location:[facility] OR       Social History   Substance Use Topics     Smoking status: Former Smoker     Packs/day: 1.50     Years: 50.00     Types: Cigarettes     Quit date: 8/20/2007     Smokeless tobacco: Never Used     Alcohol use No      Comment: Alcoholic, treatment in 1980 and 1988     Family History   Problem Relation Age of Onset     Alcohol/Drug Mother      Obesity Mother      Alzheimer Disease Mother      Cancer Father      Pancreatic     Cardiovascular Father      heart disease     Alcohol/Drug Father          Current Outpatient Prescriptions   Medication Sig Dispense Refill     acetaminophen 650 MG TABS Take 650 mg by mouth every 6 hours 100 tablet 1     biotin 2.5 mg/mL SUSP Take by mouth daily       Cyanocobalamin (VITAMIN B 12 PO) Take 2,500 mcg by mouth       furosemide (LASIX) 20 MG tablet TAKE ONE TABLET BY MOUTH ONCE DAILY IN THE MORNING 90 tablet 3     levothyroxine (SYNTHROID/LEVOTHROID) 100 MCG tablet        MULTIVITAMINS OR TABS 1 TABLET DAILY brand varies per pt       HYDROcodone-acetaminophen (NORCO) 5-325 MG per tablet Take 1 tablet by mouth every 8 hours as needed for pain (Patient not taking: Reported on 7/30/2018) 10 tablet 0     levothyroxine (SYNTHROID/LEVOTHROID) 88 MCG tablet Take 1 tablet (88 mcg) by mouth daily (Patient not taking: Reported on 7/30/2018) 90 tablet 1      "Magnesium 500 MG CAPS        ORDER FOR DME Equipment being ordered: compression stockings, med strength (Patient not taking: Reported on 7/30/2018) 1 Device prn     Allergies   Allergen Reactions     No Known Drug Allergies      BP Readings from Last 3 Encounters:   07/30/18 106/58   07/15/18 106/65   07/05/18 105/66    Wt Readings from Last 3 Encounters:   07/30/18 168 lb (76.2 kg)   07/05/18 164 lb 9.6 oz (74.7 kg)   06/05/18 168 lb (76.2 kg)                  Labs reviewed in EPIC    ROS:  No additional concerns    OBJECTIVE:     /58  Pulse 79  Temp 98  F (36.7  C) (Temporal)  Resp 14  Ht 5' 9.37\" (1.762 m)  Wt 168 lb (76.2 kg)  SpO2 98%  BMI 24.55 kg/m2  Body mass index is 24.55 kg/(m^2).  GENERAL: healthy, alert and no distress  PSYCH: mentation appears normal, affect normal. Casually dressed. Neatly groomed.     Diagnostic Test Results:  none     ASSESSMENT/PLAN:     1. Situational stress  Situational frustration around getting cat into carrier. Denies other sx of anxiety or depression in other aspects of her life.   This is an animal behavior issue not a human medical disorder necessitating treatment for the owner.  Pt is convinced cat feels her stress. Likely rescue cat associates carrier with change in owners/animal shelter.    Declined to rx valium.  Advised contacting the vet office for strategies to get cat in the carrier (putting food in carrier, etc) and use assistance of friends.   Pt states \"It saddens me you won't give me a few valium\".     Follow Up: The patient was instructed to contact clinic for worsening symptoms, non-improvement as expected/discussed, and for questions regarding medications or treatment plan. Discussed parameters for follow up and included in After Visit Summary given to patient.      Kourtney Lamb PA-C  [facility]"

## 2018-07-31 ASSESSMENT — PATIENT HEALTH QUESTIONNAIRE - PHQ9: SUM OF ALL RESPONSES TO PHQ QUESTIONS 1-9: 3

## 2018-07-31 ASSESSMENT — ANXIETY QUESTIONNAIRES: GAD7 TOTAL SCORE: 3

## 2018-10-11 DIAGNOSIS — N18.30 CHRONIC KIDNEY DISEASE, STAGE III (MODERATE) (H): Primary | ICD-10-CM

## 2018-10-11 DIAGNOSIS — E03.9 HYPOTHYROIDISM, UNSPECIFIED TYPE: ICD-10-CM

## 2018-10-11 LAB
ALBUMIN SERPL-MCNC: 3.4 G/DL (ref 3.4–5)
ANION GAP SERPL CALCULATED.3IONS-SCNC: 8 MMOL/L (ref 3–14)
BUN SERPL-MCNC: 31 MG/DL (ref 7–30)
CALCIUM SERPL-MCNC: 8.6 MG/DL (ref 8.5–10.1)
CHLORIDE SERPL-SCNC: 106 MMOL/L (ref 94–109)
CO2 SERPL-SCNC: 26 MMOL/L (ref 20–32)
CREAT SERPL-MCNC: 1.55 MG/DL (ref 0.52–1.04)
CREAT UR-MCNC: 59 MG/DL
GFR SERPL CREATININE-BSD FRML MDRD: 33 ML/MIN/1.7M2
HGB BLD-MCNC: 10.3 G/DL (ref 11.7–15.7)
MICROALBUMIN UR-MCNC: 8 MG/L
MICROALBUMIN/CREAT UR: 13.46 MG/G CR (ref 0–25)
PHOSPHATE SERPL-MCNC: 4.1 MG/DL (ref 2.5–4.5)
POTASSIUM SERPL-SCNC: 4.6 MMOL/L (ref 3.4–5.3)
PTH-INTACT SERPL-MCNC: 65 PG/ML (ref 18–80)
SODIUM SERPL-SCNC: 140 MMOL/L (ref 133–144)
TSH SERPL DL<=0.005 MIU/L-ACNC: 2.87 MU/L (ref 0.4–4)

## 2018-10-11 PROCEDURE — 82043 UR ALBUMIN QUANTITATIVE: CPT | Performed by: INTERNAL MEDICINE

## 2018-10-11 PROCEDURE — 82565 ASSAY OF CREATININE: CPT | Performed by: PHYSICIAN ASSISTANT

## 2018-10-11 PROCEDURE — 36415 COLL VENOUS BLD VENIPUNCTURE: CPT | Performed by: PHYSICIAN ASSISTANT

## 2018-10-11 PROCEDURE — 84443 ASSAY THYROID STIM HORMONE: CPT | Performed by: PHYSICIAN ASSISTANT

## 2018-10-11 PROCEDURE — 83970 ASSAY OF PARATHORMONE: CPT | Performed by: PHYSICIAN ASSISTANT

## 2018-10-11 PROCEDURE — 85018 HEMOGLOBIN: CPT | Performed by: PHYSICIAN ASSISTANT

## 2018-10-11 PROCEDURE — 82040 ASSAY OF SERUM ALBUMIN: CPT | Performed by: PHYSICIAN ASSISTANT

## 2018-10-11 PROCEDURE — 82310 ASSAY OF CALCIUM: CPT | Performed by: PHYSICIAN ASSISTANT

## 2018-10-11 PROCEDURE — 84520 ASSAY OF UREA NITROGEN: CPT | Performed by: PHYSICIAN ASSISTANT

## 2018-10-11 PROCEDURE — 84100 ASSAY OF PHOSPHORUS: CPT | Performed by: PHYSICIAN ASSISTANT

## 2018-10-11 PROCEDURE — 80051 ELECTROLYTE PANEL: CPT | Performed by: PHYSICIAN ASSISTANT

## 2018-10-17 ENCOUNTER — TRANSFERRED RECORDS (OUTPATIENT)
Dept: HEALTH INFORMATION MANAGEMENT | Facility: CLINIC | Age: 78
End: 2018-10-17

## 2019-01-23 ENCOUNTER — TELEPHONE (OUTPATIENT)
Dept: FAMILY MEDICINE | Facility: OTHER | Age: 79
End: 2019-01-23

## 2019-01-23 NOTE — TELEPHONE ENCOUNTER
"Pebbles Powell is a 78 year old female who presents with swollen gland.    NURSING ASSESSMENT:  Description:  I met with the pt who states she was at the salon and noticed the right side of her nick looked a little swollen, approximately 1\". Today if the first time she noticed it. Not painful. The bump moves. She has the same bump on the other side but that one is slightly smaller. No fever. Feels fine besides a little fatigued.    Onset/duration:  Today  Associated symptoms:  Not recently ill, no injuries.   Improves/worsens symptoms:  Did not address  Pain scale (0-10)   0/10    Allergies:   Allergies   Allergen Reactions     No Known Drug Allergies        NURSING PLAN: Nursing advice to patient Continue to monitor if it gets larger, painful or any new symptoms to callback.    RECOMMENDED DISPOSITION:  See in 24 hours   Will comply with recommendation: No- Barriers to comply with plan of care Pt would like to monitor and wait to see PCP, due for a routine OV.     Next 5 appointments (look out 90 days)    Feb 04, 2019  8:00 AM CST  [facility] [address] [place] [address] [id number] [phone]        If further questions/concerns or if symptoms do not improve, worsen or new symptoms develop, call your PCP or [place] [facility] as soon as possible.      Guideline used: Glands, Swollen or tender  Telephone Triage Protocols for Nurses, Fifth Edition, Rosa Lopez RN    "

## 2019-02-10 DIAGNOSIS — E03.9 HYPOTHYROIDISM, UNSPECIFIED TYPE: ICD-10-CM

## 2019-02-11 RX ORDER — LEVOTHYROXINE SODIUM 88 UG/1
TABLET ORAL
Qty: 90 TABLET | Refills: 0 | Status: SHIPPED | OUTPATIENT
Start: 2019-02-11 | End: 2019-03-14

## 2019-03-06 NOTE — PROGRESS NOTES
"SUBJECTIVE:   Pebbles Powell is a 78 year old female who presents for Preventive Visit.    Patient declined Influenza vaccine. Will check with Pharmacy about coverage for shingles vaccine.     Are you in the first 12 months of your Medicare coverage?  No    Annual Wellness Visit     In general, how would you rate your overall health?  Good    Frequency of exercise:  1 day/week    Duration of exercise:  Less than 15 minutes    Do you usually eat at least 4 servings of fruit and vegetables a day, include whole grains    & fiber and avoid regularly eating high fat or \"junk\" foods?  No    Taking medications regularly:  Yes    Medication side effects:  Not applicable    Ability to successfully perform activities of daily living:  No assistance needed    Home Safety:  No safety concerns identified    Hearing Impairment:  No hearing concerns   In general, how would you rate your overall mental or emotional health?     PHQ-2 Total Score: 0    Do you feel safe in your environment? Yes    Do you have a Health Care Directive? No: Advance care planning was reviewed with patient; patient declined at this time.    Fall risk  Fallen 2 or more times in the past year?: No  Any fall with injury in the past year?: No    Cognitive Screening   1) Repeat 3 items (Leader, Season, Table)    2) Clock draw: NORMAL  3) 3 item recall: Recalls 3 objects  Results: 3 items recalled: COGNITIVE IMPAIRMENT LESS LIKELY    Mini-CogTM Copyright S Cesar. Licensed by the author for use in [facility]; reprinted with permission (paras@.[facility]). All rights reserved.      Do you have sleep apnea, excessive snoring or daytime drowsiness?: yes    Reviewed and updated as needed this visit by clinical staff  Tobacco  Allergies  Meds       Reviewed and updated as needed this visit by Provider        Social History     Tobacco Use     Smoking status: Former Smoker     Packs/day: 1.50     Years: 50.00     Pack years: 75.00     Types: Cigarettes    "  Last attempt to quit: 2007     Years since quittin.5     Smokeless tobacco: Never Used   Substance Use Topics     Alcohol use: No     Alcohol/week: 0.0 oz     Comment: Alcoholic, treatment in  and        Alcohol Use 3/13/2019   If you drink alcohol do you typically have greater than 3 drinks per day OR greater than 7 drinks per week? Not Applicable     Concern - Urinary Frequency   Onset: 6 months     Description:   Always feels like she has to Urinate and does not make it to the restroom most times.     Intensity: moderate, severe    Progression of Symptoms:  worsening and constant    Accompanying Signs & Symptoms:  Wakes 4 times a night to Urinate, some burning sensation     Previous history of similar problem:   NA    Precipitating factors:   Worsened by: NA `    Alleviating factors:  Improved by: NA  Therapies Tried and outcome: Has not tried anything states that she always has to wear a pad.     She has to urinate very frequently, both during the day and at night. She has stopped drinking after 4 PM, but it hasn't had much effect at all. This seems to be more a problem with incontinence, as the urine leakage is almost constant, trickling. She notes that if she hears trickling water it causes her to leak. This problem is very annoying for her. She also has an issue with stool incontinence. She has had a bladder lift in the past as well.     Senior Living  She is also asking if there is a coordinator available to help her find a senior apartment to move into that is one level, as her three-level town home is becoming too much for her to handle, and she would like to move into a nice place that is only one level.       Current providers sharing in care for this patient include:   Patient Care Team:  Janiya Fraser MD as PCP - General  Kourtney Lamb PA-C as Assigned PCP  Marialuisa Powell LSW as Clinic Care Coordinator (Primary Care - CC)    The following health maintenance items are  "reviewed in Epic and correct as of today:  Health Maintenance   Topic Date Due     ZOSTER IMMUNIZATION (2 of 3) 03/19/2008     INFLUENZA VACCINE (1) 09/01/2018     BMP Q6 MOS  01/05/2019     PHQ-9 Q6 MONTHS  01/30/2019     LIPID MONITORING Q1 YEAR  03/07/2019     MEDICARE ANNUAL WELLNESS VISIT  03/07/2019     FALL RISK ASSESSMENT  03/07/2019     ALT Q1 YR  07/05/2019     BMP Q1 YR  07/05/2019     CBC Q1 YR  07/05/2019     HF ACTION PLAN Q3 YR  10/05/2019     TSH Q1 YEAR  10/11/2019     HEMOGLOBIN Q1 YR  10/11/2019     MICROALBUMIN Q1 YEAR  10/11/2019     ADVANCE DIRECTIVE PLANNING Q5 YRS  06/29/2020     DTAP/TDAP/TD IMMUNIZATION (3 - Td) 10/05/2026     DEXA SCAN SCREENING (SYSTEM ASSIGNED)  Completed     DEPRESSION ACTION PLAN  Completed     IPV IMMUNIZATION  Aged Out     MENINGITIS IMMUNIZATION  Aged Out     Labs reviewed in EPIC  Pneumonia Vaccine: complete  Mammogram Screening: Patient over age 75, has elected to stop mammography screening.    Review of Systems    Constitutional, HEENT, cardiovascular, pulmonary, GI, , musculoskeletal, neuro, skin, endocrine and psych systems are negative, except as in HPI or otherwise noted.     This document serves as a record of the services and decisions personally performed and made by Janiya Fraser MD. It was created on her behalf by Missy Partida, a trained medical scribe. The creation of this document is based the provider's statements to the medical scribe.  Missy Partida, March 13, 2019 9:17 AM     OBJECTIVE:   /72 (BP Location: Left arm, Patient Position: Chair, Cuff Size: Adult Regular)   Pulse 70   Temp 98  F (36.7  C) (Temporal)   Resp 16   Ht 1.765 m (5' 9.5\")   Wt 77.6 kg (171 lb)   SpO2 98%   Breastfeeding? No   BMI 24.89 kg/m   Estimated body mass index is 24.89 kg/m  as calculated from the following:    Height as of this encounter: 1.765 m (5' 9.5\").    Weight as of this encounter: 77.6 kg (171 lb).  Physical Exam  GENERAL APPEARANCE: healthy, alert and " no distress  EYES: Eyes grossly normal to inspection, PERRL and conjunctivae and sclerae normal  HENT: ear canals and TM's normal, nose and mouth without ulcers or lesions, oropharynx clear and oral mucous membranes moist  NECK: no adenopathy, no asymmetry, masses, or scars and thyroid normal to palpation  RESP: lungs clear to auscultation - no rales, rhonchi or wheezes  BREAST: normal without masses, tenderness or nipple discharge and no palpable axillary masses or adenopathy  CV: regular rate and rhythm, normal S1 S2, no S3 or S4, no murmur, click or rub, no peripheral edema and peripheral pulses strong  ABDOMEN: soft, nontender, no hepatosplenomegaly, no masses and bowel sounds normal   (female): normal female external genitalia, normal urethral meatus, vaginal mucosal atrophy noted, normal cervix, adnexae, and uterus without masses or abnormal discharge  RECTAL: Mild rectal prolapse  MS: no musculoskeletal defects are noted and gait is age appropriate without ataxia  SKIN: no suspicious lesions or rashes  NEURO: Normal strength and tone, sensory exam grossly normal, mentation intact and speech normal  PSYCH: mentation appears normal and affect normal/bright    Diagnostic Test Results:  Results for orders placed or performed in visit on 03/13/19 (from the past 24 hour(s))   *UA reflex to Microscopic and Culture ([place] [facility] (except [place] and [place])   Result Value Ref Range    Color Urine Yellow     Appearance Urine Cloudy     Glucose Urine Negative NEG^Negative mg/dL    Bilirubin Urine Negative NEG^Negative    Ketones Urine Negative NEG^Negative mg/dL    Specific Gravity Urine 1.015 1.003 - 1.035    Blood Urine Trace (A) NEG^Negative    pH Urine 6.0 5.0 - 7.0 pH    Protein Albumin Urine Negative NEG^Negative mg/dL    Urobilinogen Urine 0.2 0.2 - 1.0 EU/dL    Nitrite Urine Negative NEG^Negative    Leukocyte Esterase Urine Moderate (A) NEG^Negative    Source Unspecified Urine    Urine  Microscopic   Result Value Ref Range    WBC Urine >100 (A) OTO5^0 - 5 /HPF    RBC Urine 2-5 (A) OTO2^O - 2 /HPF    Squamous Epithelial /LPF Urine Few FEW^Few /LPF    Bacteria Urine Many (A) NEG^Negative /HPF       ASSESSMENT / PLAN:       ICD-10-CM    1. Medicare annual wellness visit, subsequent Z00.00 CARE COORDINATION REFERRAL     Urine Microscopic   2. Need for prophylactic vaccination and inoculation against influenza Z23    3. Chronic diastolic congestive heart failure (H) I50.32 BASIC METABOLIC PANEL     Lipid panel reflex to direct LDL Fasting   4. CKD (chronic kidney disease) stage 3, GFR 30-59 ml/min (H) N18.3 BASIC METABOLIC PANEL   5. Anemia secondary to renal failure D63.1 CBC with platelets   6. Hypothyroidism, unspecified type E03.9 TSH with free T4 reflex   7. 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 Iron deficiency anemia, unspecified iron deficiency anemia type D50.9 CBC with platelets     Iron and iron binding capacity     Ferritin   9. Pernicious anemia D51.0 CBC with platelets     Iron and iron binding capacity     Ferritin   10. Dysuria R30.0 *UA reflex to Microscopic and Culture ([place] [facility] (except [place] and [place])   11. Nonspecific finding on examination of urine R82.90 Urine Culture Aerobic Bacterial   12. Acute cystitis without hematuria N30.00 sulfamethoxazole-trimethoprim (BACTRIM DS/SEPTRA DS) 800-160 MG tablet   13. Rectal prolapse K62.3 [facility] PT, HAND, AND CHIROPRACTIC REFERRAL   14. Fecal smearing R15.1 [facility] PT, HAND, AND CHIROPRACTIC REFERRAL     Incontinence: She does have a current bladder infection that will need to be treated and is likely contributing to her urinary incontinence, however the rectal prolapse also indicates that she has some pelvic muscle weakness that is also involved. Referred to Pelvic physical therapy, and prescribed Bactrim to treat bladder infection.      Labs: 6 month labs were ordered for monitoring for her kidney disease, anemia,  "hypothyroidism and heart failure. Pateint will be sent the results of these labs when they are completed via [employer]. Plan to follow up in 6 months for chronic problem recheck.     Senior Living: Referred to care coordinator for assistance with finding a senior living apartment that is suitable for her.     End of Life Planning:  Patient currently has an advanced directive: Yes.  Practitioner is supportive of decision.    COUNSELING:  Reviewed preventive health counseling, as reflected in patient instructions       Regular exercise       Healthy diet/nutrition       Bladder control       Fall risk prevention       Immunizations    Recommended patient ask at pharmacy about Shingrix vaccine due to cost    BP Readings from Last 1 Encounters:   03/13/19 122/72     Estimated body mass index is 24.89 kg/m  as calculated from the following:    Height as of this encounter: 1.765 m (5' 9.5\").    Weight as of this encounter: 77.6 kg (171 lb).     reports that she quit smoking about 11 years ago. Her smoking use included cigarettes. She has a 75.00 pack-year smoking history. she has never used smokeless tobacco.      Appropriate preventive services were discussed with this patient, including applicable screening as appropriate for cardiovascular disease, diabetes, osteopenia/osteoporosis, and glaucoma.  As appropriate for age/gender, discussed screening for colorectal cancer, prostate cancer, breast cancer, and cervical cancer. Checklist reviewing preventive services available has been given to the patient.    Reviewed patients plan of care and provided an AVS. The Basic Care Plan (routine screening as documented in Health Maintenance) for Pebbles meets the Care Plan requirement. This Care Plan has been established and reviewed with the Patient.    Counseling Resources:  ATP IV Guidelines  Pooled Cohorts Equation Calculator  Breast Cancer Risk Calculator  FRAX Risk Assessment  ICSI Preventive Guidelines  Dietary Guidelines for " Americans, 2010  USDA's MyPlate  ASA Prophylaxis  Lung CA Screening    The information in this document, created by the medical scribe for me, accurately reflects the services I personally performed and the decisions made by me. I have reviewed and approved this document for accuracy.     Janiya Fraser MD, MD  [facility]

## 2019-03-06 NOTE — PATIENT INSTRUCTIONS
Preventive Health Recommendations    See your health care provider every year to    Review health changes.     Discuss preventive care.      Review your medicines if your doctor has prescribed any.      You no longer need a yearly Pap test unless you've had an abnormal Pap test in the past 10 years. If you have vaginal symptoms, such as bleeding or discharge, be sure to talk with your provider about a Pap test.      Every 1 to 2 years, have a mammogram.  If you are over 69, talk with your health care provider about whether or not you want to continue having screening mammograms.      Every 10 years, have a colonoscopy. Or, have a yearly FIT test (stool test). These exams will check for colon cancer.       Have a cholesterol test every 5 years, or more often if your doctor advises it.       Have a diabetes test (fasting glucose) every three years. If you are at risk for diabetes, you should have this test more often.       At age 65, have a bone density scan (DEXA) to check for osteoporosis (brittle bone disease).    Shots:    Get a flu shot each year.    Get a tetanus shot every 10 years.    Talk to your doctor about your pneumonia vaccines. There are now two you should receive - Pneumovax (PPSV 23) and Prevnar (PCV 13).    Talk to your pharmacist about the shingles vaccine.    Talk to your doctor about the hepatitis B vaccine.    Nutrition:     Eat at least 5 servings of fruits and vegetables each day.      Eat whole-grain bread, whole-wheat pasta and brown rice instead of white grains and rice.      Get adequate Calcium and Vitamin D.     Lifestyle    Exercise at least 150 minutes a week (30 minutes a day, 5 days a week). This will help you control your weight and prevent disease.      Limit alcohol to one drink per day.      No smoking.       Wear sunscreen to prevent skin cancer.       See your dentist twice a year for an exam and cleaning.      See your eye doctor every 1 to 2 years to screen for conditions  such as glaucoma, macular degeneration and cataracts.    Personalized Prevention Plan  You are due for the preventive services outlined below.  Your care team is available to assist you in scheduling these services.  If you have already completed any of these items, please share that information with your care team to update in your medical record.  Health Maintenance Due   Topic Date Due     Zoster (Shingles) Vaccine (2 of 3) 03/19/2008     Flu Vaccine (1) 09/01/2018     Basic Metabolic Lab - every 6 months  01/05/2019     Depression Assessment - every 6 months  01/30/2019     Cholesterol Lab - yearly  03/07/2019     Annual Wellness Visit  03/07/2019     FALL RISK ASSESSMENT  03/07/2019

## 2019-03-13 ENCOUNTER — OFFICE VISIT (OUTPATIENT)
Dept: FAMILY MEDICINE | Facility: OTHER | Age: 79
End: 2019-03-13
Payer: COMMERCIAL

## 2019-03-13 VITALS
OXYGEN SATURATION: 98 % | HEART RATE: 70 BPM | DIASTOLIC BLOOD PRESSURE: 72 MMHG | HEIGHT: 70 IN | BODY MASS INDEX: 24.48 KG/M2 | SYSTOLIC BLOOD PRESSURE: 122 MMHG | RESPIRATION RATE: 16 BRPM | TEMPERATURE: 98 F | WEIGHT: 171 LBS

## 2019-03-13 DIAGNOSIS — N18.30 CKD (CHRONIC KIDNEY DISEASE) STAGE 3, GFR 30-59 ML/MIN (H): ICD-10-CM

## 2019-03-13 DIAGNOSIS — R15.1 FECAL SMEARING: ICD-10-CM

## 2019-03-13 DIAGNOSIS — Z23 NEED FOR PROPHYLACTIC VACCINATION AND INOCULATION AGAINST INFLUENZA: ICD-10-CM

## 2019-03-13 DIAGNOSIS — K62.3 RECTAL PROLAPSE: ICD-10-CM

## 2019-03-13 DIAGNOSIS — Z00.00 MEDICARE ANNUAL WELLNESS VISIT, SUBSEQUENT: Primary | ICD-10-CM

## 2019-03-13 DIAGNOSIS — D51.0 PERNICIOUS ANEMIA: ICD-10-CM

## 2019-03-13 DIAGNOSIS — N30.00 ACUTE CYSTITIS WITHOUT HEMATURIA: ICD-10-CM

## 2019-03-13 DIAGNOSIS — D50.9 IRON DEFICIENCY ANEMIA, UNSPECIFIED IRON DEFICIENCY ANEMIA TYPE: ICD-10-CM

## 2019-03-13 DIAGNOSIS — D63.1 ANEMIA SECONDARY TO RENAL FAILURE: ICD-10-CM

## 2019-03-13 DIAGNOSIS — R30.0 DYSURIA: ICD-10-CM

## 2019-03-13 DIAGNOSIS — F32.1 MODERATE MAJOR DEPRESSION (H): ICD-10-CM

## 2019-03-13 DIAGNOSIS — I50.32 CHRONIC DIASTOLIC CONGESTIVE HEART FAILURE (H): ICD-10-CM

## 2019-03-13 DIAGNOSIS — N18.9 ANEMIA SECONDARY TO RENAL FAILURE: ICD-10-CM

## 2019-03-13 DIAGNOSIS — R82.90 NONSPECIFIC FINDING ON EXAMINATION OF URINE: ICD-10-CM

## 2019-03-13 DIAGNOSIS — Z87.891 PERSONAL HISTORY OF TOBACCO USE: ICD-10-CM

## 2019-03-13 DIAGNOSIS — E03.9 HYPOTHYROIDISM, UNSPECIFIED TYPE: ICD-10-CM

## 2019-03-13 LAB
ALBUMIN UR-MCNC: NEGATIVE MG/DL
ANION GAP SERPL CALCULATED.3IONS-SCNC: 7 MMOL/L (ref 3–14)
APPEARANCE UR: ABNORMAL
BACTERIA #/AREA URNS HPF: ABNORMAL /HPF
BILIRUB UR QL STRIP: NEGATIVE
BUN SERPL-MCNC: 32 MG/DL (ref 7–30)
CALCIUM SERPL-MCNC: 8.7 MG/DL (ref 8.5–10.1)
CHLORIDE SERPL-SCNC: 109 MMOL/L (ref 94–109)
CHOLEST SERPL-MCNC: 151 MG/DL
CO2 SERPL-SCNC: 25 MMOL/L (ref 20–32)
COLOR UR AUTO: YELLOW
CREAT SERPL-MCNC: 1.64 MG/DL (ref 0.52–1.04)
ERYTHROCYTE [DISTWIDTH] IN BLOOD BY AUTOMATED COUNT: 13.2 % (ref 10–15)
FERRITIN SERPL-MCNC: 112 NG/ML (ref 8–252)
GFR SERPL CREATININE-BSD FRML MDRD: 30 ML/MIN/{1.73_M2}
GLUCOSE SERPL-MCNC: 94 MG/DL (ref 70–99)
GLUCOSE UR STRIP-MCNC: NEGATIVE MG/DL
HCT VFR BLD AUTO: 31.1 % (ref 35–47)
HDLC SERPL-MCNC: 47 MG/DL
HGB BLD-MCNC: 9.7 G/DL (ref 11.7–15.7)
HGB UR QL STRIP: ABNORMAL
IRON SATN MFR SERPL: 24 % (ref 15–46)
IRON SERPL-MCNC: 67 UG/DL (ref 35–180)
KETONES UR STRIP-MCNC: NEGATIVE MG/DL
LDLC SERPL CALC-MCNC: 91 MG/DL
LEUKOCYTE ESTERASE UR QL STRIP: ABNORMAL
MCH RBC QN AUTO: 30.5 PG (ref 26.5–33)
MCHC RBC AUTO-ENTMCNC: 31.2 G/DL (ref 31.5–36.5)
MCV RBC AUTO: 98 FL (ref 78–100)
NITRATE UR QL: NEGATIVE
NON-SQ EPI CELLS #/AREA URNS LPF: ABNORMAL /LPF
NONHDLC SERPL-MCNC: 104 MG/DL
PH UR STRIP: 6 PH (ref 5–7)
PLATELET # BLD AUTO: 156 10E9/L (ref 150–450)
POTASSIUM SERPL-SCNC: 5.5 MMOL/L (ref 3.4–5.3)
RBC # BLD AUTO: 3.18 10E12/L (ref 3.8–5.2)
RBC #/AREA URNS AUTO: ABNORMAL /HPF
SODIUM SERPL-SCNC: 141 MMOL/L (ref 133–144)
SOURCE: ABNORMAL
SP GR UR STRIP: 1.01 (ref 1–1.03)
T4 FREE SERPL-MCNC: 0.83 NG/DL (ref 0.76–1.46)
TIBC SERPL-MCNC: 280 UG/DL (ref 240–430)
TRIGL SERPL-MCNC: 67 MG/DL
TSH SERPL DL<=0.005 MIU/L-ACNC: 12.65 MU/L (ref 0.4–4)
UROBILINOGEN UR STRIP-ACNC: 0.2 EU/DL (ref 0.2–1)
WBC # BLD AUTO: 5.3 10E9/L (ref 4–11)
WBC #/AREA URNS AUTO: >100 /HPF

## 2019-03-13 PROCEDURE — G0296 VISIT TO DETERM LDCT ELIG: HCPCS | Performed by: FAMILY MEDICINE

## 2019-03-13 PROCEDURE — 80061 LIPID PANEL: CPT | Performed by: FAMILY MEDICINE

## 2019-03-13 PROCEDURE — 81001 URINALYSIS AUTO W/SCOPE: CPT | Performed by: FAMILY MEDICINE

## 2019-03-13 PROCEDURE — 82728 ASSAY OF FERRITIN: CPT | Performed by: FAMILY MEDICINE

## 2019-03-13 PROCEDURE — 36415 COLL VENOUS BLD VENIPUNCTURE: CPT | Performed by: FAMILY MEDICINE

## 2019-03-13 PROCEDURE — 87086 URINE CULTURE/COLONY COUNT: CPT | Performed by: FAMILY MEDICINE

## 2019-03-13 PROCEDURE — 99397 PER PM REEVAL EST PAT 65+ YR: CPT | Performed by: FAMILY MEDICINE

## 2019-03-13 PROCEDURE — 84439 ASSAY OF FREE THYROXINE: CPT | Performed by: FAMILY MEDICINE

## 2019-03-13 PROCEDURE — 80048 BASIC METABOLIC PNL TOTAL CA: CPT | Performed by: FAMILY MEDICINE

## 2019-03-13 PROCEDURE — 83550 IRON BINDING TEST: CPT | Performed by: FAMILY MEDICINE

## 2019-03-13 PROCEDURE — 85027 COMPLETE CBC AUTOMATED: CPT | Performed by: FAMILY MEDICINE

## 2019-03-13 PROCEDURE — 87088 URINE BACTERIA CULTURE: CPT | Performed by: FAMILY MEDICINE

## 2019-03-13 PROCEDURE — 83540 ASSAY OF IRON: CPT | Performed by: FAMILY MEDICINE

## 2019-03-13 PROCEDURE — 87186 SC STD MICRODIL/AGAR DIL: CPT | Performed by: FAMILY MEDICINE

## 2019-03-13 PROCEDURE — 84443 ASSAY THYROID STIM HORMONE: CPT | Performed by: FAMILY MEDICINE

## 2019-03-13 PROCEDURE — 99213 OFFICE O/P EST LOW 20 MIN: CPT | Mod: 25 | Performed by: FAMILY MEDICINE

## 2019-03-13 RX ORDER — SULFAMETHOXAZOLE/TRIMETHOPRIM 800-160 MG
1 TABLET ORAL 2 TIMES DAILY
Qty: 6 TABLET | Refills: 0 | Status: SHIPPED | OUTPATIENT
Start: 2019-03-13 | End: 2019-07-12

## 2019-03-13 ASSESSMENT — MIFFLIN-ST. JEOR: SCORE: 1337.96

## 2019-03-13 ASSESSMENT — ACTIVITIES OF DAILY LIVING (ADL): CURRENT_FUNCTION: NO ASSISTANCE NEEDED

## 2019-03-13 NOTE — RESULT ENCOUNTER NOTE
Anemia lab is in and worsening. Will wait for the kidney function testing to see if we have to look for another source or if I should update the nephrologist.  Please let me know if you have any questions.    Janiya Fraser MD

## 2019-03-13 NOTE — PROGRESS NOTES
Lung Cancer Screening Shared Decision Making Visit     Pebbles Powell is eligible for lung cancer screening on the basis of the information provided in my signed lung cancer screening order.     I have discussed with patient the risks and benefits of screening for lung cancer with low-dose CT.     The risks include:  radiation exposure: one low dose chest CT has as much ionizing radiation as about 15 chest x-rays or 6 months of background radiation living in [place]    false positives: 96% of positive findings/nodules are NOT cancer, but some might still require additional diagnostic evaluation, including biopsy  over-diagnosis: some slow growing cancers that might never have been clinically significant will be detected and treated unnecessarily     The benefit of early detection of lung cancer is contingent upon adherence to annual screening or more frequent follow up if indicated.     Furthermore, reaping the benefits of screening requires Pebbles Powell to be willing and physically able to undergo diagnostic procedures, if indicated. Although no specific guide is available for determining severity of comorbidities, it is reasonable to withhold screening in patients who have greater mortality risk from other diseases.     We did discuss that the only way to prevent lung cancer is to not smoke. Already quit    I did not offer risk estimation using a calculator such as this one:    ShouldIScreen

## 2019-03-14 RX ORDER — LEVOTHYROXINE SODIUM 100 UG/1
88 TABLET ORAL DAILY
Qty: 90 TABLET | Refills: 0 | Status: SHIPPED | OUTPATIENT
Start: 2019-03-14 | End: 2019-06-04

## 2019-03-14 NOTE — RESULT ENCOUNTER NOTE
Pebbles, your results show worsening of the kidney functions, which may have to do with the hydration changes with the bladder infection. I would like to recheck the kidneys next week af the infection has cleared. Your potassium is high as well (which may be related to the poorer functioning. And it may have contributed to the anemia worsening.  Though your thyroid looks like it is off and will need increased replacement which we can do right now. I will update your meds.  Plan labs next week.  Please let me know if you have any questions.    Janiya Fraser MD

## 2019-03-15 LAB
BACTERIA SPEC CULT: ABNORMAL
Lab: ABNORMAL
SPECIMEN SOURCE: ABNORMAL

## 2019-03-18 ENCOUNTER — PATIENT OUTREACH (OUTPATIENT)
Dept: CARE COORDINATION | Facility: CLINIC | Age: 79
End: 2019-03-18

## 2019-03-18 ASSESSMENT — ACTIVITIES OF DAILY LIVING (ADL): DEPENDENT_IADLS:: INDEPENDENT

## 2019-03-18 NOTE — PROGRESS NOTES
Clinic Care Coordination Contact    Clinic Care Coordination Contact  OUTREACH    Referral Information: assist pt with resources for finding Senior Housing apartment  Referral Source: PCP    Primary Diagnosis: Psychosocial    Chief Complaint   Patient presents with     Clinic Care Coordination - Initial             Universal Utilization:   Clinic Utilization  Difficulty keeping appointments:: No  Compliance Concerns: No  No-Show Concerns: No  No PCP office visit in Past Year: No  Utilization    Last refreshed: 3/16/2019  7:26 PM:  Hospital Admissions 0           Last refreshed: 3/16/2019  7:26 PM:  ED Visits 0           Last refreshed: 3/16/2019  7:26 PM:  No Show Count (past year) 0              Current as of: 3/16/2019  7:26 PM            Clinical Concerns:  Current Medical Concerns:    Patient Active Problem List   Diagnosis     CHF (Congestive Heart Failure), diastolic dysfunction     CKD (chronic kidney disease) stage 3, GFR 30-59 ml/min (H)     Pernicious anemia     Iron deficiency anemia     BCC (basal cell carcinoma), right lower leg     Vulvar itching     Mammogram declined     Moderate major depression (H)     Advance care planning     Total knee replacement status     Hypothyroidism     History of basal cell carcinoma     Porokeratosis     Anemia secondary to renal failure         Current Behavioral Concerns: Diagnosis of depression, not taking medication for mood. No concerns with mood/behavior discussed today.     Education Provided to patient: Introduced self and explained role of CC. Will send Access plan via "[employer].    Pain  Pain (GOAL):: No  Health Maintenance Reviewed: Due/Overdue   Health Maintenance Due   Topic Date Due     ZOSTER IMMUNIZATION (2 of 3) 03/19/2008     INFLUENZA VACCINE (1) 09/01/2018     PHQ-9 Q6 MONTHS  01/30/2019     MEDICARE ANNUAL WELLNESS VISIT  03/07/2019       Clinical Pathway: None    Medication Management:  See medication list. Pt takes medications independently.       Functional Status:  Dependent ADLs:: Independent  Dependent IADLs:: Independent  Bed or wheelchair confined:: No  Mobility Status: Independent    Living Situation: Pt lives in a multi-story [facility] and is hoping to find something all on one level or an apartment setting, Senior Housing and possibly something with option of progressing to Assisted living for when that is needed. Pt states she has looked into the new [facility] in [place] and she is unable to afford that. Pt is requesting that JAMI CC e-mail her some resources to her e-mail. No PHI will be emailed. Pt e-mail is [email or web address]. Let pt know of websites she can do own research as well as [employer]. Will send this info to pt via e-mail. Pt states she will call if has further questions once gets the e-mail. Pt very appreciative of the assistance/resources.     Current living arrangement:: I live alone  Type of residence:: Town home(stairs)    Diet/Exercise/Sleep:  Diet:: Regular  Inadequate nutrition (GOAL):: No  Food Insecurity: No  Tube Feeding: No  Inadequate activity/exercise (GOAL):: No  Significant changes in sleep pattern (GOAL): No    Transportation:  Transportation concerns (GOAL):: No  Transportation means:: Regular car     Psychosocial:  Mental health DX:: Yes  Mental health DX how managed:: None  Mental health management concern (GOAL):: No  Informal Support system:: Family, Children     Financial/Insurance:   Financial/Insurance concerns (GOAL):: No  [employer] Medicare Advantage     Resources and Interventions:  Current Resources:      Community Resources: None    Advance Care Plan/Directive  Advanced Care Plans/Directives on file:: No  Advanced Care Plan/Directive Status: Declined Further Information    Referrals Placed: Assisted Living(Senior housing)     Goals: n/a    Patient/Caregiver understanding: yes       Future Appointments              In 2 days NL LAB [facility]           Plan: JAMI GRIFFITH will e-mail resources to pt via her personal e-mail. Pt states she will call JAMI GRIFFITH if has questions.       BHAVNA [facility] Care Coordinator  [facility]  3/18/2019 10:53 AM  [phone]

## 2019-03-18 NOTE — LETTER
Health Care Home - Access Care Plan    About Me  Patient Name:  Pebbles Powell    YOB: 1940  Age:                             78 year old   Lizbeth MRN:            [id number] Telephone Information:   Home Phone [phone]   Mobile [phone]       Address:    [address] [address] Email address:  [email or web address]      Emergency Contact(s)  Name Relationship Lgl Grd Work Phone Home Phone Mobile Phone   1. KAYLYN RICH Daughter   [phone]    2. NONE PER PT Other   [phone]              Health Maintenance: Routine Health maintenance Reviewed: Due/Overdue - talk with your provider about Health Maintenance that needs to be done  Health Maintenance Due   Topic Date Due     ZOSTER IMMUNIZATION (2 of 3) 03/19/2008     INFLUENZA VACCINE (1) 09/01/2018     PHQ-9 Q6 MONTHS  01/30/2019     MEDICARE ANNUAL WELLNESS VISIT  03/07/2019         My Access Plan  Medical Emergency 911   Questions or concerns during clinic hours Primary Clinic Line, I will call the clinic directly: [facility] - [phone]   24 Hour Appointment Line [phone] or  [phone] [phone]) (toll free)   24 Hour Nurse Line [phone] (toll free)   Questions or concerns outside clinic hours 24 Hour Appointment Line, I will call the after-hours on-call line:   [facility] [phone] or [phone]) (toll-free)   Preferred Urgent Care     Preferred Hospital     Preferred Pharmacy [facility] [address] [place], [place] - [address] [facility]     Behavioral Health Crisis Line The National Suicide Prevention Lifeline at 1-766.232.3740 or 911     My Care Team Members  Patient Care Team       Relationship Specialty Notifications Start End    Janiya Fraser MD PCP - General   3/14/08     Phone: [phone] Fax: [phone] [address] [address]    Kourtney Lamb PA-C Assigned PCP   8/5/18     Phone: [phone] Fax:  [phone]         [address] [facility] [address]    Marialuisa Powell LSW Clinic Care Coordinator Primary Care - [facility] Admissions 3/13/19     Phone: [phone]                My Medical and Care Information  Problem List   Patient Active Problem List   Diagnosis     CHF (Congestive Heart Failure), diastolic dysfunction     CKD (chronic kidney disease) stage 3, GFR 30-59 ml/min (H)     Pernicious anemia     Iron deficiency anemia     BCC (basal cell carcinoma), right lower leg     Vulvar itching     Mammogram declined     Moderate major depression (H)     Advance care planning     Total knee replacement status     Hypothyroidism     History of basal cell carcinoma     Porokeratosis     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

## 2019-03-18 NOTE — LETTER
[place] CARE COORDINATION  [facility]  [address]  [place] [address]     March 18, 2019    Pebbles Powell  [address] [address]      Dear Pebbles,    I am a clinic care coordinator who works with Janiya Fraser MD, MD at [facility]. I wanted to thank you for spending the time to talk with me.  I wanted to provide you with my contact information so that you can call me with questions or concerns about your health care. Below is a description of clinic care coordination and how I can further assist you.     The clinic care coordinator is a registered nurse and/or  who understand the health care system. The goal of clinic care coordination is to help you manage your health and improve access to the [facility] in the most efficient manner. The registered nurse can assist you in meeting your health care goals by providing education, coordinating services, and strengthening the communication among your providers. The  can assist you with financial, behavioral, psychosocial, chemical dependency, counseling, and/or psychiatric resources.    Please feel free to contact me at [phone], with any questions or concerns. We at [facility] are focused on providing you with the highest-quality healthcare experience possible and that all starts with you.     Sincerely,     BHAVNA Miranda Clinic Care Coordinator                                                        [facility]                                                      [phone]      Enclosed: I have enclosed a copy of a 24 Hour Access Plan. This has helpful phone numbers for you to call when needed. Please keep this in an easy to access place to use as needed.

## 2019-03-20 DIAGNOSIS — D63.1 ANEMIA SECONDARY TO RENAL FAILURE: ICD-10-CM

## 2019-03-20 DIAGNOSIS — N18.9 ANEMIA SECONDARY TO RENAL FAILURE: ICD-10-CM

## 2019-03-20 DIAGNOSIS — D50.9 IRON DEFICIENCY ANEMIA, UNSPECIFIED IRON DEFICIENCY ANEMIA TYPE: ICD-10-CM

## 2019-03-20 DIAGNOSIS — N18.30 CKD (CHRONIC KIDNEY DISEASE) STAGE 3, GFR 30-59 ML/MIN (H): ICD-10-CM

## 2019-03-20 LAB
ANION GAP SERPL CALCULATED.3IONS-SCNC: 7 MMOL/L (ref 3–14)
BUN SERPL-MCNC: 35 MG/DL (ref 7–30)
CALCIUM SERPL-MCNC: 8.9 MG/DL (ref 8.5–10.1)
CHLORIDE SERPL-SCNC: 109 MMOL/L (ref 94–109)
CO2 SERPL-SCNC: 25 MMOL/L (ref 20–32)
CREAT SERPL-MCNC: 1.57 MG/DL (ref 0.52–1.04)
ERYTHROCYTE [DISTWIDTH] IN BLOOD BY AUTOMATED COUNT: 12.9 % (ref 10–15)
GFR SERPL CREATININE-BSD FRML MDRD: 32 ML/MIN/{1.73_M2}
GLUCOSE SERPL-MCNC: 91 MG/DL (ref 70–99)
HCT VFR BLD AUTO: 31.4 % (ref 35–47)
HGB BLD-MCNC: 10 G/DL (ref 11.7–15.7)
MCH RBC QN AUTO: 30.6 PG (ref 26.5–33)
MCHC RBC AUTO-ENTMCNC: 31.8 G/DL (ref 31.5–36.5)
MCV RBC AUTO: 96 FL (ref 78–100)
PLATELET # BLD AUTO: 178 10E9/L (ref 150–450)
POTASSIUM SERPL-SCNC: 5.1 MMOL/L (ref 3.4–5.3)
RBC # BLD AUTO: 3.27 10E12/L (ref 3.8–5.2)
SODIUM SERPL-SCNC: 141 MMOL/L (ref 133–144)
WBC # BLD AUTO: 6.4 10E9/L (ref 4–11)

## 2019-03-20 PROCEDURE — 80048 BASIC METABOLIC PNL TOTAL CA: CPT | Performed by: FAMILY MEDICINE

## 2019-03-20 PROCEDURE — 36415 COLL VENOUS BLD VENIPUNCTURE: CPT | Performed by: FAMILY MEDICINE

## 2019-03-20 PROCEDURE — 85027 COMPLETE CBC AUTOMATED: CPT | Performed by: FAMILY MEDICINE

## 2019-03-20 NOTE — RESULT ENCOUNTER NOTE
Pebbles, your anemia is again improving.   Please let me know if you have any questions.    Janiya Fraser MD

## 2019-03-21 NOTE — RESULT ENCOUNTER NOTE
Chronic stable kidney disease seen.  Please let me know if you have any questions.    Janiya Fraser MD

## 2019-04-03 ENCOUNTER — TELEPHONE (OUTPATIENT)
Dept: FAMILY MEDICINE | Facility: OTHER | Age: 79
End: 2019-04-03

## 2019-04-03 NOTE — TELEPHONE ENCOUNTER
Reason for Call:  Other call back    Detailed comments: pt stopped by to report she is moving into a senior apprtment building and would like a letter from dr quintana stating her cat is a  animal so she doesn't have to pay extra for it at the new apartment.  She would like this on her mychart if possible and she can just print it off of there.  If not, then call her when ready and she can  at the clinic,  Please call when ready.  Thank you .      Phone Number Patient can be reached at: Home number on file [phone] (home)    Best Time: any    Can we leave a detailed message on this number? YES    Call taken on 4/3/2019 at 11:13 AM by Anyi Cox

## 2019-04-03 NOTE — LETTER
[address]  [facility] [address]  Phone: [phone]    April 4, 2019        RE:  Pebbles Powell  [address] [address]          To whom it may concern:    Pebbels Powell is a patient at our clinic and under our care. The patient carries a mental health diagnosis. Due to this, it is in our medical opinion that she be allowed to keep her cat as it is a  animal that provides significant mental health benefits.       Please contact me for questions or concerns.      Sincerely,          Damian Jameson PA-C  For Dr. Janiya Fraser  [facility]

## 2019-04-04 NOTE — TELEPHONE ENCOUNTER
Attempted to reach the patient with the following information.  Left vm notifying her the letter was walked to the  for .     Norma Aguilar MA

## 2019-04-04 NOTE — TELEPHONE ENCOUNTER
Will forward to covering providers to see if they are able to write letter for patient. In Dr. ERENDIRA mata .

## 2019-04-24 ENCOUNTER — PATIENT OUTREACH (OUTPATIENT)
Dept: CARE COORDINATION | Facility: CLINIC | Age: 79
End: 2019-04-24

## 2019-04-24 NOTE — PROGRESS NOTES
Clinic Care Coordination Contact  [facility]/Voicemail       Clinical Data: Care Coordinator Outreach  Outreach attempted x 1.  Left message on voicemail with call back information and requested return call.  Plan: Care Coordinator mailed out care coordination introduction letter on 3/18/19. Care Coordinator will try to reach patient again in 3-5 business days.      BHAVNA Miranda   Primary Care Clinic- Social Work Care Coordinator  [facility]  4/24/2019 3:52 PM  [phone]

## 2019-05-01 ENCOUNTER — TELEPHONE (OUTPATIENT)
Dept: FAMILY MEDICINE | Facility: OTHER | Age: 79
End: 2019-05-01

## 2019-05-01 DIAGNOSIS — I50.32 CHRONIC DIASTOLIC CONGESTIVE HEART FAILURE (H): Primary | ICD-10-CM

## 2019-05-01 RX ORDER — FUROSEMIDE 20 MG
20 TABLET ORAL DAILY
Qty: 30 TABLET | Refills: 0 | Status: SHIPPED | OUTPATIENT
Start: 2019-05-01 | End: 2019-08-09

## 2019-05-01 NOTE — TELEPHONE ENCOUNTER
Patient stopped in clinic today requesting to go back on furosemide. She states that she was taken off at last OV. I advised she may need an appt but she declined at this time and asked that I just ask you if she can go back on them. She C/O severe swelling in both ankles. She is wearing compression stockings with no relief. Please advise Thank you

## 2019-05-01 NOTE — PROGRESS NOTES
Clinic Care Coordination Contact  [facility]/Voicemail       Clinical Data: Care Coordinator Outreach  Outreach attempted x 2.  Left message on voicemail with call back information and requested return call.  Plan: Care Coordinator mailed out care coordination introduction letter on 3/18/19. Care Coordinator will try to reach patient again in 5-7 business days.      BHAVNA Miranda   Primary Care Clinic- Social Work Care Coordinator  [facility]  5/1/2019 9:34 AM  [phone]

## 2019-05-01 NOTE — TELEPHONE ENCOUNTER
She had worsening at last ov labs, recheck was improved after UTI improved. Stopped lasix at same time, so can restart today, but may come up with alternative plan if kidneys again worsen. Recheck in 1 month, ok to plan ov to discuss afterward (in at least 1 day later)so we can make a plan.  Janiya Fraser MD

## 2019-05-01 NOTE — TELEPHONE ENCOUNTER
Spoke to patient and let her know AE message. She will let us know for labs as she is in the process of moving.  Maddison Barajas MA

## 2019-05-10 NOTE — PROGRESS NOTES
Clinic Care Coordination Contact  [facility]/Voicemail       Clinical Data: Care Coordinator Outreach  Outreach attempted x 3.  Left message on voicemail with call back information and requested return call.  Plan: Care Coordinator mailed out care coordination introduction letter on 3/18/19. Care Coordinator will do no further outreaches at this time. Will keep pt on Care team another 1-2 weeks in case calls back otherwise will close at that time.     BHAVNA Miranda   Primary Care Clinic- Social Work Care Coordinator  TurnerJemal, Jose, [facility]  5/10/2019 11:14 AM  [phone]

## 2019-05-22 NOTE — PROGRESS NOTES
Clinic Care Coordination Contact    Situation: Patient chart reviewed by care coordinator.    Background: JAMI GRIFFITH spoke with pt and provided resources for Senior housing apartments.     Assessment: JAMI GRIFFITH has been attempting to reach pt to follow up. JAMI JENSEN noted in telephone encounter that pt was requesting a letter from PCP for her cat to be with her in her new senior apartment she is moving to. Appears pt has found a senior housing. JAMI GRIFFITH attempted to call x3 and sent letter with no success of reaching her. Kept on care team another 1-2 weeks in case called back.     Plan/Recommendations: Did not hear from pt. JAMI GRIFFITH will close to CC.     BHAVNA Miranda   Primary Care Clinic- Social Work Care Coordinator  [facility]  5/22/2019 10:11 AM  [phone]

## 2019-06-03 DIAGNOSIS — E03.9 HYPOTHYROIDISM, UNSPECIFIED TYPE: ICD-10-CM

## 2019-06-03 DIAGNOSIS — I50.32 CHRONIC DIASTOLIC CONGESTIVE HEART FAILURE (H): ICD-10-CM

## 2019-06-03 LAB
ANION GAP SERPL CALCULATED.3IONS-SCNC: 5 MMOL/L (ref 3–14)
BUN SERPL-MCNC: 35 MG/DL (ref 7–30)
CALCIUM SERPL-MCNC: 8.6 MG/DL (ref 8.5–10.1)
CHLORIDE SERPL-SCNC: 113 MMOL/L (ref 94–109)
CO2 SERPL-SCNC: 26 MMOL/L (ref 20–32)
CREAT SERPL-MCNC: 1.74 MG/DL (ref 0.52–1.04)
GFR SERPL CREATININE-BSD FRML MDRD: 28 ML/MIN/{1.73_M2}
GLUCOSE SERPL-MCNC: 77 MG/DL (ref 70–99)
POTASSIUM SERPL-SCNC: 4.5 MMOL/L (ref 3.4–5.3)
SODIUM SERPL-SCNC: 144 MMOL/L (ref 133–144)
T4 FREE SERPL-MCNC: 0.88 NG/DL (ref 0.76–1.46)
TSH SERPL DL<=0.005 MIU/L-ACNC: 5.4 MU/L (ref 0.4–4)

## 2019-06-03 PROCEDURE — 36415 COLL VENOUS BLD VENIPUNCTURE: CPT | Performed by: FAMILY MEDICINE

## 2019-06-03 PROCEDURE — 84443 ASSAY THYROID STIM HORMONE: CPT | Performed by: FAMILY MEDICINE

## 2019-06-03 PROCEDURE — 80048 BASIC METABOLIC PNL TOTAL CA: CPT | Performed by: FAMILY MEDICINE

## 2019-06-03 PROCEDURE — 84439 ASSAY OF FREE THYROXINE: CPT | Performed by: FAMILY MEDICINE

## 2019-06-04 ENCOUNTER — TELEPHONE (OUTPATIENT)
Dept: FAMILY MEDICINE | Facility: OTHER | Age: 79
End: 2019-06-04

## 2019-06-04 DIAGNOSIS — E03.9 HYPOTHYROIDISM, UNSPECIFIED TYPE: ICD-10-CM

## 2019-06-04 RX ORDER — LEVOTHYROXINE SODIUM 112 UG/1
112 TABLET ORAL DAILY
Qty: 90 TABLET | Refills: 0 | Status: SHIPPED | OUTPATIENT
Start: 2019-06-04 | End: 2019-08-12

## 2019-06-04 NOTE — TELEPHONE ENCOUNTER
Thyroid results returned and are closer to normal, but still appear a little underreplaced. I will increase again and plan to recheck in 3 months.  Janiya Fraser MD

## 2019-07-11 NOTE — PROGRESS NOTES
Subjective     Pebbles Powell is a 78 year old female who presents to clinic today for the following health issues:    HPI     Chief Complaint   Patient presents with     [employer] Health Maintenance     Zoster Immun, PHQ9, Medicare Annual Wellness, ALT     Patient was diagnosed with diastolic congestive heart failure in 2008.  She has not seen cardiology or had another echo.  Patient notes symptoms are stable.  She denies orthopnea, shortness of breath, chest pain, palpitations.  She notes chronic edema.  She thinks her furosemide was recently stopped due to renal function.    Patient has chronic kidney disease.  She has been following with a nephrologist in [place].  She has anemia of chronic disease.  She had an iron transfusion in October.      Hypothyroidism-  Levothyroxine was recently adjusted.  Due for labs again in 4-8 weeks.    Patient notes unintentional weight loss.  She notes a poor appetite and fatigue.  Patient denies cough, shortness of breath, nausea, vomiting, diarrhea, constipation, melena, hematochezia, dysuria, vaginal discharge.  Patient does note some chronic fecal incontinence.  Patient is a former smoker and quit 12 years ago.  Patient reports that her mood is stable.  She recently moved to a senior apartment and loves it.          Patient Active Problem List   Diagnosis     CHF (Congestive Heart Failure), diastolic dysfunction     CKD (chronic kidney disease) stage 3, GFR 30-59 ml/min (H)     Pernicious anemia     Iron deficiency anemia     BCC (basal cell carcinoma), right lower leg     Vulvar itching     Mammogram declined     Moderate major depression (H)     Advance care planning     Total knee replacement status     Hypothyroidism     History of basal cell carcinoma     Porokeratosis     Anemia secondary to renal failure     Past Surgical History:   Procedure Laterality Date     APPENDECTOMY       ARTHROPLASTY KNEE Right 7/13/2015    Procedure: ARTHROPLASTY KNEE;  Surgeon:  Santino Storm MD;  Location: [facility] OR     ARTHROSCOPY KNEE  2010    ARTHROSCOPY KNEE performed by BROOKLYNN PULIDO at [facility] OR     C ANTER VESICOURETHROPEXY,SIMPLE       COLONOSCOPY  2011    Procedure:COMBINED COLONOSCOPY, SINGLE BIOPSY/POLYPECTOMY BY BIOPSY; Surgeon:JEANNETTE WYNNE; Location: GI     COLPORRHAPHY ANTERIOR       DILATION AND CURETTAGE       ENT SURGERY      T&A     ESOPHAGOSCOPY, GASTROSCOPY, DUODENOSCOPY (EGD), COMBINED  2011    Procedure:COMBINED ESOPHAGOSCOPY, GASTROSCOPY, DUODENOSCOPY (EGD), BIOPSY SINGLE OR MULTIPLE; MULTIPLE; Surgeon:JEANNETTE WYNNE; Location: GI     EXCISE LESION LOWER EXTREMITY  2014    Procedure: EXCISE LESION LOWER EXTREMITY;  Surgeon: Santos Roberson MD;  Location: [facility] OR     HEMORRHOIDECTOMY       HYSTERECTOMY, GABRIEL      fibroids     LAPAROTOMY, TUMOR DEBULKING, COMBINED  2012    Procedure:COMBINED LAPAROTOMY, TUMOR DEBULKING; Exploratory Laparotomy,  Bilateral Salpingo Oophorectomy; Surgeon:MIKIE LU; Location:[facility] OR       Social History     Tobacco Use     Smoking status: Former Smoker     Packs/day: 1.50     Years: 50.00     Pack years: 75.00     Types: Cigarettes     Last attempt to quit: 2007     Years since quittin.9     Smokeless tobacco: Never Used   Substance Use Topics     Alcohol use: No     Alcohol/week: 0.0 oz     Comment: Alcoholic, treatment in  and      Family History   Problem Relation Age of Onset     Alcohol/Drug Mother      Obesity Mother      Alzheimer Disease Mother      Cancer Father         Pancreatic     Cardiovascular Father         heart disease     Alcohol/Drug Father          Current Outpatient Medications   Medication Sig Dispense Refill     acetaminophen 650 MG TABS Take 650 mg by mouth every 6 hours 100 tablet 1     Cyanocobalamin (VITAMIN B 12 PO) Take 2,500 mcg by mouth       levothyroxine (SYNTHROID/LEVOTHROID) 112 MCG tablet Take 1 tablet (112 mcg) by mouth daily 90 tablet  "0     Magnesium 500 MG CAPS Take 250 mg by mouth daily        MULTIVITAMINS OR TABS 1 TABLET DAILY brand varies per pt       ORDER FOR DME Equipment being ordered: compression stockings, med strength 1 Device prn     furosemide (LASIX) 20 MG tablet Take 1 tablet (20 mg) by mouth daily (Patient not taking: Reported on 7/12/2019) 30 tablet 0     Allergies   Allergen Reactions     No Known Drug Allergies      Recent Labs   Lab Test 06/03/19  0952 03/20/19  0846 03/13/19  0930  07/05/18  1054 03/07/18  0919  08/14/17  1055 05/22/17  0855  10/05/16  1108   LDL  --   --  91  --   --  69  --   --  84  --   --    HDL  --   --  47*  --   --  49*  --   --  37*  --   --    TRIG  --   --  67  --   --  169*  --   --  73  --   --    ALT  --   --   --   --  18  --   --  16  --   --  21   CR 1.74* 1.57* 1.64*   < > 1.70* 1.63*   < > 1.55*  --    < > 1.32*   GFRESTIMATED 28* 32* 30*   < > 29* 31*   < > 33*  --    < > 39*   GFRESTBLACK 32* 37* 35*   < > 35* 37*   < > 39*  --    < > 48*   POTASSIUM 4.5 5.1 5.5*   < > 4.1 4.3   < > 4.6  --    < > 4.3   TSH 5.40*  --  12.65*   < > 0.36* 5.73*  --   --  1.70  --   --     < > = values in this interval not displayed.        Reviewed and updated as needed this visit by Provider  Tobacco  Allergies  Meds  Problems  Med Hx  Surg Hx  Fam Hx         Review of Systems   ROS COMP: Constitutional, HEENT, cardiovascular, pulmonary, gi and gu systems are negative, except as otherwise noted.      Objective    /54   Pulse 81   Temp 97.5  F (36.4  C) (Oral)   Resp 18   Ht 1.765 m (5' 9.5\")   Wt 74.1 kg (163 lb 6.4 oz)   SpO2 98%   BMI 23.78 kg/m    Body mass index is 23.78 kg/m .  Physical Exam   GENERAL: healthy, alert and no distress  RESP: lungs clear to auscultation - no rales, rhonchi or wheezes  CV: regular rate and rhythm, normal S1 S2, no S3 or S4, no murmur, click or rub, no peripheral edema and peripheral pulses strong  ABDOMEN: soft, nontender, no hepatosplenomegaly, no " masses and bowel sounds normal  MS: no gross musculoskeletal defects noted, no edema    Diagnostic Test Results:  In process        Assessment & Plan     1. Chronic diastolic congestive heart failure (H)  Will check labs given swelling.  Echocardiogram to evaluate for changes given fatigue, edema.  - Echocardiogram Complete; Future  - BNP-N terminal pro    2. CKD (chronic kidney disease) stage 3, GFR 30-59 ml/min (H)    - NEPHROLOGY ADULT REFERRAL    3. Moderate major depression (H)  Stable.  Continue current treatment plan and medications.      4. Hypothyroidism, unspecified type  Await repeat labs in 4-8 weeks.    5. Anemia secondary to renal failure  Recheck today.    6. Unintentional weight loss    - CBC with platelets and differential  - Comprehensive metabolic panel (BMP + Alb, Alk Phos, ALT, AST, Total. Bili, TP)  - UA with Microscopic reflex to Culture  - XR Chest 2 Views; Future    7. Nonspecific finding on examination of urine    - Urine Culture Aerobic Bacterial           Return in about 4 weeks (around 8/9/2019) for Weight Recheck- unintentional weight loss.    RANDOLPH Toussaint [facility]

## 2019-07-12 ENCOUNTER — OFFICE VISIT (OUTPATIENT)
Dept: FAMILY MEDICINE | Facility: CLINIC | Age: 79
End: 2019-07-12
Payer: COMMERCIAL

## 2019-07-12 ENCOUNTER — ANCILLARY PROCEDURE (OUTPATIENT)
Dept: GENERAL RADIOLOGY | Facility: CLINIC | Age: 79
End: 2019-07-12
Attending: NURSE PRACTITIONER
Payer: COMMERCIAL

## 2019-07-12 VITALS
RESPIRATION RATE: 18 BRPM | HEIGHT: 70 IN | HEART RATE: 81 BPM | WEIGHT: 163.4 LBS | OXYGEN SATURATION: 98 % | SYSTOLIC BLOOD PRESSURE: 122 MMHG | BODY MASS INDEX: 23.39 KG/M2 | DIASTOLIC BLOOD PRESSURE: 54 MMHG | TEMPERATURE: 97.5 F

## 2019-07-12 DIAGNOSIS — N18.30 CKD (CHRONIC KIDNEY DISEASE) STAGE 3, GFR 30-59 ML/MIN (H): ICD-10-CM

## 2019-07-12 DIAGNOSIS — N18.9 ANEMIA SECONDARY TO RENAL FAILURE: ICD-10-CM

## 2019-07-12 DIAGNOSIS — R63.4 UNINTENTIONAL WEIGHT LOSS: ICD-10-CM

## 2019-07-12 DIAGNOSIS — I50.32 CHRONIC DIASTOLIC CONGESTIVE HEART FAILURE (H): Primary | ICD-10-CM

## 2019-07-12 DIAGNOSIS — F32.1 MODERATE MAJOR DEPRESSION (H): ICD-10-CM

## 2019-07-12 DIAGNOSIS — E03.9 HYPOTHYROIDISM, UNSPECIFIED TYPE: ICD-10-CM

## 2019-07-12 DIAGNOSIS — R82.90 NONSPECIFIC FINDING ON EXAMINATION OF URINE: ICD-10-CM

## 2019-07-12 DIAGNOSIS — D63.1 ANEMIA SECONDARY TO RENAL FAILURE: ICD-10-CM

## 2019-07-12 LAB
ALBUMIN SERPL-MCNC: 3.4 G/DL (ref 3.4–5)
ALBUMIN UR-MCNC: NEGATIVE MG/DL
ALP SERPL-CCNC: 80 U/L (ref 40–150)
ALT SERPL W P-5'-P-CCNC: 19 U/L (ref 0–50)
ANION GAP SERPL CALCULATED.3IONS-SCNC: 6 MMOL/L (ref 3–14)
APPEARANCE UR: ABNORMAL
AST SERPL W P-5'-P-CCNC: 15 U/L (ref 0–45)
BACTERIA #/AREA URNS HPF: ABNORMAL /HPF
BASOPHILS # BLD AUTO: 0 10E9/L (ref 0–0.2)
BASOPHILS NFR BLD AUTO: 0 %
BILIRUB SERPL-MCNC: 0.2 MG/DL (ref 0.2–1.3)
BILIRUB UR QL STRIP: NEGATIVE
BUN SERPL-MCNC: 30 MG/DL (ref 7–30)
CALCIUM SERPL-MCNC: 8.8 MG/DL (ref 8.5–10.1)
CHLORIDE SERPL-SCNC: 112 MMOL/L (ref 94–109)
CO2 SERPL-SCNC: 23 MMOL/L (ref 20–32)
COLOR UR AUTO: YELLOW
CREAT SERPL-MCNC: 1.62 MG/DL (ref 0.52–1.04)
DIFFERENTIAL METHOD BLD: ABNORMAL
EOSINOPHIL # BLD AUTO: 0 10E9/L (ref 0–0.7)
EOSINOPHIL NFR BLD AUTO: 0.2 %
ERYTHROCYTE [DISTWIDTH] IN BLOOD BY AUTOMATED COUNT: 13.5 % (ref 10–15)
GFR SERPL CREATININE-BSD FRML MDRD: 30 ML/MIN/{1.73_M2}
GLUCOSE SERPL-MCNC: 90 MG/DL (ref 70–99)
GLUCOSE UR STRIP-MCNC: NEGATIVE MG/DL
HCT VFR BLD AUTO: 30.4 % (ref 35–47)
HGB BLD-MCNC: 9.6 G/DL (ref 11.7–15.7)
HGB UR QL STRIP: ABNORMAL
KETONES UR STRIP-MCNC: NEGATIVE MG/DL
LEUKOCYTE ESTERASE UR QL STRIP: ABNORMAL
LYMPHOCYTES # BLD AUTO: 2.9 10E9/L (ref 0.8–5.3)
LYMPHOCYTES NFR BLD AUTO: 43.6 %
MCH RBC QN AUTO: 30 PG (ref 26.5–33)
MCHC RBC AUTO-ENTMCNC: 31.6 G/DL (ref 31.5–36.5)
MCV RBC AUTO: 95 FL (ref 78–100)
MONOCYTES # BLD AUTO: 0.8 10E9/L (ref 0–1.3)
MONOCYTES NFR BLD AUTO: 12.6 %
NEUTROPHILS # BLD AUTO: 2.9 10E9/L (ref 1.6–8.3)
NEUTROPHILS NFR BLD AUTO: 43.6 %
NITRATE UR QL: NEGATIVE
NON-SQ EPI CELLS #/AREA URNS LPF: ABNORMAL /LPF
NT-PROBNP SERPL-MCNC: 319 PG/ML (ref 0–450)
PH UR STRIP: 5.5 PH (ref 5–7)
PLATELET # BLD AUTO: 159 10E9/L (ref 150–450)
POTASSIUM SERPL-SCNC: 4.7 MMOL/L (ref 3.4–5.3)
PROT SERPL-MCNC: 6.5 G/DL (ref 6.8–8.8)
RBC # BLD AUTO: 3.2 10E12/L (ref 3.8–5.2)
RBC #/AREA URNS AUTO: ABNORMAL /HPF
RENAL EPI CELLS #/AREA URNS HPF: ABNORMAL /HPF
SODIUM SERPL-SCNC: 141 MMOL/L (ref 133–144)
SOURCE: ABNORMAL
SP GR UR STRIP: 1.01 (ref 1–1.03)
UROBILINOGEN UR STRIP-ACNC: 0.2 EU/DL (ref 0.2–1)
WBC # BLD AUTO: 6.6 10E9/L (ref 4–11)
WBC #/AREA URNS AUTO: >100 /HPF
WBC CLUMPS #/AREA URNS HPF: PRESENT /HPF

## 2019-07-12 PROCEDURE — 99214 OFFICE O/P EST MOD 30 MIN: CPT | Performed by: NURSE PRACTITIONER

## 2019-07-12 PROCEDURE — 80053 COMPREHEN METABOLIC PANEL: CPT | Performed by: NURSE PRACTITIONER

## 2019-07-12 PROCEDURE — 36415 COLL VENOUS BLD VENIPUNCTURE: CPT | Performed by: NURSE PRACTITIONER

## 2019-07-12 PROCEDURE — 83880 ASSAY OF NATRIURETIC PEPTIDE: CPT | Performed by: NURSE PRACTITIONER

## 2019-07-12 PROCEDURE — 87086 URINE CULTURE/COLONY COUNT: CPT | Performed by: NURSE PRACTITIONER

## 2019-07-12 PROCEDURE — 81001 URINALYSIS AUTO W/SCOPE: CPT | Performed by: NURSE PRACTITIONER

## 2019-07-12 PROCEDURE — 87088 URINE BACTERIA CULTURE: CPT | Performed by: NURSE PRACTITIONER

## 2019-07-12 PROCEDURE — 85025 COMPLETE CBC W/AUTO DIFF WBC: CPT | Performed by: NURSE PRACTITIONER

## 2019-07-12 PROCEDURE — 71046 X-RAY EXAM CHEST 2 VIEWS: CPT

## 2019-07-12 PROCEDURE — 87186 SC STD MICRODIL/AGAR DIL: CPT | Performed by: NURSE PRACTITIONER

## 2019-07-12 ASSESSMENT — MIFFLIN-ST. JEOR: SCORE: 1303.49

## 2019-07-12 ASSESSMENT — PAIN SCALES - GENERAL: PAINLEVEL: NO PAIN (0)

## 2019-07-12 ASSESSMENT — PATIENT HEALTH QUESTIONNAIRE - PHQ9: SUM OF ALL RESPONSES TO PHQ QUESTIONS 1-9: 3

## 2019-07-12 NOTE — RESULT ENCOUNTER NOTE
Dear Pebbles,    Your recent test results are attached.      Normal chest x-ray.    If you have any questions please feel free to contact [phone] or myself via [employer].    Sincerely,  Harriet Da Silva, CNP

## 2019-07-12 NOTE — RESULT ENCOUNTER NOTE
Dear Pebbles,    Your recent test results are attached.      Stable anemia.  Possible bladder infection.  I'll await culture results and contact you.    If you have any questions please feel free to contact [phone] or myself via [employer].    Sincerely,  Harriet Da Silva, CNP

## 2019-07-15 LAB
BACTERIA SPEC CULT: ABNORMAL
SPECIMEN SOURCE: ABNORMAL

## 2019-07-15 NOTE — RESULT ENCOUNTER NOTE
Dear Pebbles,    Your recent test results are attached.      Stable kidney function.    If you have any questions please feel free to contact [phone] or myself via [employer].    Sincerely,  Harriet Da Silva, CNP

## 2019-07-22 ENCOUNTER — ANCILLARY PROCEDURE (OUTPATIENT)
Dept: CARDIOLOGY | Facility: CLINIC | Age: 79
End: 2019-07-22
Attending: NURSE PRACTITIONER
Payer: COMMERCIAL

## 2019-07-22 DIAGNOSIS — I50.32 CHRONIC DIASTOLIC CONGESTIVE HEART FAILURE (H): ICD-10-CM

## 2019-07-22 PROCEDURE — 93306 TTE W/DOPPLER COMPLETE: CPT | Performed by: INTERNAL MEDICINE

## 2019-08-07 NOTE — PROGRESS NOTES
Subjective     Pebbles Powell is a 78 year old female who presents to clinic today for the following health issues:    HPI     Chief Complaint   Patient presents with     Weight Problem     loosing unintentionlly     Patient Request     regarding urinary problem     Patient is here for follow-up of unintentional weight loss.  She denies any further weight loss and feels well overall.  She completed treatment for UTI.  She wonders why she has had 2 in the past year and what she can do to prevent them.  She also would like to review echocardiogram results.  Patient denies significant edema, chest pain, shortness of breath, orthopnea.  Patient does notes some mild edema to her lower legs at the end of each day.    7/22/19 Echocardiogram  Interpretation Summary     Global and regional left ventricular function is normal with an EF of 60-65%.  Left ventricular diastolic function is normal.  Right ventricular function, chamber size, wall motion, and thickness are  normal.  Pulmonary artery systolic pressure is normal.  The inferior vena cava is normal.  No pericardial effusion is present.          Patient Active Problem List   Diagnosis     CHF (Congestive Heart Failure), diastolic dysfunction     CKD (chronic kidney disease) stage 3, GFR 30-59 ml/min (H)     Pernicious anemia     Iron deficiency anemia     BCC (basal cell carcinoma), right lower leg     Vulvar itching     Mammogram declined     Moderate major depression (H)     Advance care planning     Total knee replacement status     Hypothyroidism     History of basal cell carcinoma     Porokeratosis     Anemia secondary to renal failure     Past Surgical History:   Procedure Laterality Date     APPENDECTOMY       ARTHROPLASTY KNEE Right 7/13/2015    Procedure: ARTHROPLASTY KNEE;  Surgeon: Santino Storm MD;  [facility]     ARTHROSCOPY KNEE  11/23/2010    ARTHROSCOPY KNEE performed by BR[facility]LYNN PULIDO at [facility]     C ANTER VESICOURETHROPEXY,SIMPLE        COLONOSCOPY  2011    Procedure:COMBINED COLONOSCOPY, SINGLE BIOPSY/POLYPECTOMY BY BIOPSY; Surgeon:JEANNETTE WYNNE; Location:[facility]     COLPORRHAPHY ANTERIOR       DILATION AND CURETTAGE       ENT SURGERY      T&A     ESOPHAGOSCOPY, GASTROSCOPY, DUODENOSCOPY (EGD), COMBINED  2011    Procedure:COMBINED ESOPHAGOSCOPY, GASTROSCOPY, DUODENOSCOPY (EGD), BIOPSY SINGLE OR MULTIPLE; MULTIPLE; Surgeon:JEANNETTE WYNNE; [facility]     EXCISE LESION LOWER EXTREMITY  2014    Procedure: EXCISE LESION LOWER EXTREMITY;  Surgeon: Santos Robesron MD;  [facility]     HEMORRHOIDECTOMY       HYSTERECTOMY, GABRIEL      fibroids     LAPAROTOMY, TUMOR DEBULKING, COMBINED  2012    Procedure:COMBINED LAPAROTOMY, TUMOR DEBULKING; Exploratory Laparotomy,  Bilateral Salpingo Oophorectomy; Surgeon:MIKIE LU; [facility]       Social History     Tobacco Use     Smoking status: Former Smoker     Packs/day: 1.50     Years: 50.00     Pack years: 75.00     Types: Cigarettes     Last attempt to quit: 2007     Years since quittin.9     Smokeless tobacco: Never Used   Substance Use Topics     Alcohol use: No     Alcohol/week: 0.0 oz     Comment: Alcoholic, treatment in  and      Family History   Problem Relation Age of Onset     Alcohol/Drug Mother      Obesity Mother      Alzheimer Disease Mother      Cancer Father         Pancreatic     Cardiovascular Father         heart disease     Alcohol/Drug Father          Current Outpatient Medications   Medication Sig Dispense Refill     acetaminophen 650 MG TABS Take 650 mg by mouth every 6 hours 100 tablet 1     Cyanocobalamin (VITAMIN B 12 PO) Take 2,500 mcg by mouth       levothyroxine (SYNTHROID/LEVOTHROID) 112 MCG tablet Take 1 tablet (112 mcg) by mouth daily 90 tablet 0     Magnesium 500 MG CAPS Take 250 mg by mouth daily        MULTIVITAMINS OR TABS 1 TABLET DAILY brand varies per pt       order for DME Equipment being ordered: cindy  "moderate compression socks 15-20 mmHG 1 each 1     Allergies   Allergen Reactions     No Known Drug Allergies      BP Readings from Last 3 Encounters:   08/09/19 100/56   07/12/19 122/54   03/13/19 122/72    Wt Readings from Last 3 Encounters:   08/09/19 74.8 kg (164 lb 12.8 oz)   07/12/19 74.1 kg (163 lb 6.4 oz)   03/13/19 77.6 kg (171 lb)                    Reviewed and updated as needed this visit by Provider  Tobacco  Allergies  Meds  Problems  Med Hx  Surg Hx  Fam Hx         Review of Systems   ROS COMP: Constitutional, HEENT, cardiovascular, pulmonary, gi and gu systems are negative, except as otherwise noted.      Objective    /56   Pulse 77   Temp 96.9  F (36.1  C) (Oral)   Resp 18   Ht 1.765 m (5' 9.5\")   Wt 74.8 kg (164 lb 12.8 oz)   SpO2 100%   BMI 23.99 kg/m    Body mass index is 23.99 kg/m .  Physical Exam   GENERAL: healthy, alert and no distress  RESP: lungs clear to auscultation - no rales, rhonchi or wheezes  CV: regular rate and rhythm, normal S1 S2, no S3 or S4, no murmur, click or rub, no peripheral edema and peripheral pulses strong  MS: no gross musculoskeletal defects noted, no edema    Diagnostic Test Results:  Labs reviewed in Epic        Assessment & Plan     1. Skin lesion  Patient requests routine skin care.  - DERMATOLOGY REFERRAL    2. Chronic diastolic congestive heart failure (H)  Normal echocardiogram.  Patient no longer on meds.  Continue to monitor.    3. Unintentional weight loss  Stable.  Patient to continue to monitor.    4. Hypothyroidism, unspecified type  Recheck today.  - TSH with free T4 reflex    5. Bilateral lower extremity edema    - order for DME; Equipment being ordered: toeless moderate compression socks 15-20 mmHG  Dispense: 1 each; Refill: 1    6. Acute cystitis without hematuria  I discussed potential causes of UTI.  Patient has had 2 in the past year, which is not overly concerning.  Patient to continue to monitor for symptoms.     "         Return in about 3 months (around 11/9/2019) for Ancillary visit for weight check..    RANDOLPH Toussaint CNP  [facility]

## 2019-08-09 ENCOUNTER — OFFICE VISIT (OUTPATIENT)
Dept: FAMILY MEDICINE | Facility: CLINIC | Age: 79
End: 2019-08-09
Payer: COMMERCIAL

## 2019-08-09 VITALS
DIASTOLIC BLOOD PRESSURE: 56 MMHG | HEART RATE: 77 BPM | HEIGHT: 70 IN | RESPIRATION RATE: 18 BRPM | SYSTOLIC BLOOD PRESSURE: 100 MMHG | TEMPERATURE: 96.9 F | WEIGHT: 164.8 LBS | OXYGEN SATURATION: 100 % | BODY MASS INDEX: 23.59 KG/M2

## 2019-08-09 DIAGNOSIS — R60.0 BILATERAL LOWER EXTREMITY EDEMA: ICD-10-CM

## 2019-08-09 DIAGNOSIS — R63.4 UNINTENTIONAL WEIGHT LOSS: ICD-10-CM

## 2019-08-09 DIAGNOSIS — E03.9 HYPOTHYROIDISM, UNSPECIFIED TYPE: ICD-10-CM

## 2019-08-09 DIAGNOSIS — L98.9 SKIN LESION: Primary | ICD-10-CM

## 2019-08-09 DIAGNOSIS — I50.32 CHRONIC DIASTOLIC CONGESTIVE HEART FAILURE (H): ICD-10-CM

## 2019-08-09 DIAGNOSIS — N30.00 ACUTE CYSTITIS WITHOUT HEMATURIA: ICD-10-CM

## 2019-08-09 LAB — TSH SERPL DL<=0.005 MIU/L-ACNC: 0.79 MU/L (ref 0.4–4)

## 2019-08-09 PROCEDURE — 36415 COLL VENOUS BLD VENIPUNCTURE: CPT | Performed by: NURSE PRACTITIONER

## 2019-08-09 PROCEDURE — 84443 ASSAY THYROID STIM HORMONE: CPT | Performed by: NURSE PRACTITIONER

## 2019-08-09 PROCEDURE — 99214 OFFICE O/P EST MOD 30 MIN: CPT | Performed by: NURSE PRACTITIONER

## 2019-08-09 ASSESSMENT — PAIN SCALES - GENERAL: PAINLEVEL: NO PAIN (0)

## 2019-08-09 ASSESSMENT — MIFFLIN-ST. JEOR: SCORE: 1309.84

## 2019-08-12 DIAGNOSIS — E03.9 HYPOTHYROIDISM, UNSPECIFIED TYPE: ICD-10-CM

## 2019-08-12 RX ORDER — LEVOTHYROXINE SODIUM 112 UG/1
112 TABLET ORAL DAILY
Qty: 90 TABLET | Refills: 3 | Status: SHIPPED | OUTPATIENT
Start: 2019-08-12 | End: 2020-09-17

## 2019-08-27 ENCOUNTER — TELEPHONE (OUTPATIENT)
Dept: NEPHROLOGY | Facility: CLINIC | Age: 79
End: 2019-08-27

## 2019-08-27 DIAGNOSIS — D63.1 ANEMIA IN CHRONIC KIDNEY DISEASE (CODE): ICD-10-CM

## 2019-08-27 DIAGNOSIS — N18.30 CKD (CHRONIC KIDNEY DISEASE) STAGE 3, GFR 30-59 ML/MIN (H): Primary | ICD-10-CM

## 2019-08-27 NOTE — TELEPHONE ENCOUNTER
Health Call Center    Phone Message    May a detailed message be left on voicemail: yes    Reason for Call: Order(s): Other:   Reason for requested: Labs  Date needed: 10/21/19  Provider name: Yancy      Action Taken: Message routed to:  [facility] ([facility]): Please enter lab orders for Pt's appt. on 10/21/19, She will be going to a  clinic lab within one week of the appt. - You can call her if any questions.

## 2019-09-30 ENCOUNTER — TELEPHONE (OUTPATIENT)
Dept: FAMILY MEDICINE | Facility: CLINIC | Age: 79
End: 2019-09-30

## 2019-09-30 NOTE — LETTER
[facility]  [address] [place] [id number]  [phone]          October 3, 2019    Pebbles Powell                                                                                                                   [address] [address]        Dear Zully:    We have tried to reach you by phone, but were unable to do so.    You are currently scheduled to see Harriet Da Silva CNP on Monday, March 16, 2020, at 8:40 a.m.    We are sending you this letter to let you know that Harriet is not available at this time.  We need you reschedule this appointment.    Feel free to call us at [phone], to reschedule, or you can also do this thru your [email or web address] account.    We are sorry for any inconvenience this may have caused.  Thank you.    Sincerely,         [facility]/

## 2019-09-30 NOTE — TELEPHONE ENCOUNTER
Called patient and left message to reschedule appointment with Harriet Freeman on 03/16/2019.       Patty FRYE CMA ([facility])

## 2019-10-01 NOTE — TELEPHONE ENCOUNTER
2nd attempt Called patient and left message to reschedule appointment with Harriet Freeman on 03/16/2020.   Tahira Miranda CMA on 10/1/2019 at 8:38 AM

## 2019-10-17 DIAGNOSIS — R82.90 NONSPECIFIC FINDING ON EXAMINATION OF URINE: Primary | ICD-10-CM

## 2019-10-17 DIAGNOSIS — D63.1 ANEMIA IN CHRONIC KIDNEY DISEASE (CODE): ICD-10-CM

## 2019-10-17 DIAGNOSIS — N18.30 CKD (CHRONIC KIDNEY DISEASE) STAGE 3, GFR 30-59 ML/MIN (H): ICD-10-CM

## 2019-10-17 LAB
ALBUMIN SERPL-MCNC: 3.5 G/DL (ref 3.4–5)
ALBUMIN UR-MCNC: NEGATIVE MG/DL
ANION GAP SERPL CALCULATED.3IONS-SCNC: 4 MMOL/L (ref 3–14)
APPEARANCE UR: ABNORMAL
BACTERIA #/AREA URNS HPF: ABNORMAL /HPF
BILIRUB UR QL STRIP: NEGATIVE
BUN SERPL-MCNC: 37 MG/DL (ref 7–30)
CALCIUM SERPL-MCNC: 9.2 MG/DL (ref 8.5–10.1)
CHLORIDE SERPL-SCNC: 113 MMOL/L (ref 94–109)
CO2 SERPL-SCNC: 26 MMOL/L (ref 20–32)
COLOR UR AUTO: YELLOW
CREAT SERPL-MCNC: 1.9 MG/DL (ref 0.52–1.04)
CREAT UR-MCNC: 78 MG/DL
ERYTHROCYTE [DISTWIDTH] IN BLOOD BY AUTOMATED COUNT: 13.8 % (ref 10–15)
FERRITIN SERPL-MCNC: 118 NG/ML (ref 8–252)
GFR SERPL CREATININE-BSD FRML MDRD: 25 ML/MIN/{1.73_M2}
GLUCOSE SERPL-MCNC: 93 MG/DL (ref 70–99)
GLUCOSE UR STRIP-MCNC: NEGATIVE MG/DL
HCT VFR BLD AUTO: 30 % (ref 35–47)
HGB BLD-MCNC: 9.5 G/DL (ref 11.7–15.7)
HGB UR QL STRIP: NEGATIVE
IRON SATN MFR SERPL: 15 % (ref 15–46)
IRON SERPL-MCNC: 37 UG/DL (ref 35–180)
KETONES UR STRIP-MCNC: NEGATIVE MG/DL
LEUKOCYTE ESTERASE UR QL STRIP: ABNORMAL
MCH RBC QN AUTO: 29.7 PG (ref 26.5–33)
MCHC RBC AUTO-ENTMCNC: 31.7 G/DL (ref 31.5–36.5)
MCV RBC AUTO: 94 FL (ref 78–100)
NITRATE UR QL: NEGATIVE
PH UR STRIP: 5.5 PH (ref 5–7)
PHOSPHATE SERPL-MCNC: 5.3 MG/DL (ref 2.5–4.5)
PLATELET # BLD AUTO: 205 10E9/L (ref 150–450)
POTASSIUM SERPL-SCNC: 5.1 MMOL/L (ref 3.4–5.3)
PROT UR-MCNC: 0.13 G/L
PROT/CREAT 24H UR: 0.17 G/G CR (ref 0–0.2)
PTH-INTACT SERPL-MCNC: 103 PG/ML (ref 18–80)
RBC # BLD AUTO: 3.2 10E12/L (ref 3.8–5.2)
RBC #/AREA URNS AUTO: ABNORMAL /HPF
SODIUM SERPL-SCNC: 143 MMOL/L (ref 133–144)
SOURCE: ABNORMAL
SP GR UR STRIP: 1.01 (ref 1–1.03)
TIBC SERPL-MCNC: 254 UG/DL (ref 240–430)
UROBILINOGEN UR STRIP-ACNC: 0.2 EU/DL (ref 0.2–1)
WBC # BLD AUTO: 6.6 10E9/L (ref 4–11)
WBC #/AREA URNS AUTO: ABNORMAL /HPF

## 2019-10-17 PROCEDURE — 36415 COLL VENOUS BLD VENIPUNCTURE: CPT | Performed by: INTERNAL MEDICINE

## 2019-10-17 PROCEDURE — 83970 ASSAY OF PARATHORMONE: CPT | Performed by: INTERNAL MEDICINE

## 2019-10-17 PROCEDURE — 87086 URINE CULTURE/COLONY COUNT: CPT | Performed by: INTERNAL MEDICINE

## 2019-10-17 PROCEDURE — 85027 COMPLETE CBC AUTOMATED: CPT | Performed by: INTERNAL MEDICINE

## 2019-10-17 PROCEDURE — 83550 IRON BINDING TEST: CPT | Performed by: INTERNAL MEDICINE

## 2019-10-17 PROCEDURE — 82306 VITAMIN D 25 HYDROXY: CPT | Performed by: INTERNAL MEDICINE

## 2019-10-17 PROCEDURE — 83540 ASSAY OF IRON: CPT | Performed by: INTERNAL MEDICINE

## 2019-10-17 PROCEDURE — 84156 ASSAY OF PROTEIN URINE: CPT | Performed by: INTERNAL MEDICINE

## 2019-10-17 PROCEDURE — 82728 ASSAY OF FERRITIN: CPT | Performed by: INTERNAL MEDICINE

## 2019-10-17 PROCEDURE — 80069 RENAL FUNCTION PANEL: CPT | Performed by: INTERNAL MEDICINE

## 2019-10-17 PROCEDURE — 81001 URINALYSIS AUTO W/SCOPE: CPT | Performed by: INTERNAL MEDICINE

## 2019-10-18 LAB
BACTERIA SPEC CULT: NORMAL
DEPRECATED CALCIDIOL+CALCIFEROL SERPL-MC: 50 UG/L (ref 20–75)
SPECIMEN SOURCE: NORMAL

## 2019-10-21 ENCOUNTER — OFFICE VISIT (OUTPATIENT)
Dept: NEPHROLOGY | Facility: CLINIC | Age: 79
End: 2019-10-21
Payer: COMMERCIAL

## 2019-10-21 VITALS
SYSTOLIC BLOOD PRESSURE: 120 MMHG | BODY MASS INDEX: 23.58 KG/M2 | WEIGHT: 162 LBS | HEART RATE: 78 BPM | DIASTOLIC BLOOD PRESSURE: 68 MMHG

## 2019-10-21 DIAGNOSIS — N18.9 ANEMIA SECONDARY TO RENAL FAILURE: ICD-10-CM

## 2019-10-21 DIAGNOSIS — E61.1 IRON DEFICIENCY: ICD-10-CM

## 2019-10-21 DIAGNOSIS — D50.9 IRON DEFICIENCY ANEMIA, UNSPECIFIED IRON DEFICIENCY ANEMIA TYPE: ICD-10-CM

## 2019-10-21 DIAGNOSIS — R15.9 INCONTINENCE OF FECES, UNSPECIFIED FECAL INCONTINENCE TYPE: Primary | ICD-10-CM

## 2019-10-21 DIAGNOSIS — D63.1 ANEMIA SECONDARY TO RENAL FAILURE: ICD-10-CM

## 2019-10-21 DIAGNOSIS — N39.492 POSTURAL URINARY INCONTINENCE: ICD-10-CM

## 2019-10-21 DIAGNOSIS — N18.4 CKD (CHRONIC KIDNEY DISEASE) STAGE 4, GFR 15-29 ML/MIN (H): ICD-10-CM

## 2019-10-21 DIAGNOSIS — N18.30 CHRONIC KIDNEY DISEASE, STAGE III (MODERATE) (H): Primary | ICD-10-CM

## 2019-10-21 DIAGNOSIS — N18.30 ANEMIA IN STAGE 3 CHRONIC KIDNEY DISEASE (H): ICD-10-CM

## 2019-10-21 DIAGNOSIS — D63.1 ANEMIA IN STAGE 3 CHRONIC KIDNEY DISEASE (H): ICD-10-CM

## 2019-10-21 RX ORDER — HEPARIN SODIUM (PORCINE) LOCK FLUSH IV SOLN 100 UNIT/ML 100 UNIT/ML
5 SOLUTION INTRAVENOUS
Status: CANCELLED | OUTPATIENT
Start: 2019-10-28

## 2019-10-21 RX ORDER — HEPARIN SODIUM,PORCINE 10 UNIT/ML
5 VIAL (ML) INTRAVENOUS
Status: CANCELLED | OUTPATIENT
Start: 2019-10-28

## 2019-10-21 NOTE — LETTER
10/21/2019       RE: Pebbles Powell  [address]  [place] [address]     Dear Colleague,    Thank you for referring your patient, Pebbles Powell, to the [facility]. Please see a copy of my visit note below.    Assessment and Plan:  78 year old female who has CKD stage 3 who presents for evaluation. She has seen Dr Manzo in the past but recently moved to [place]. She has history of hypothyroidism, her baseline Scr recently is near 1.5-1.6  She has had iron deficiency and anemia.   # CKD:  Suspect due to NSAID use in the past, she is not hypertensive on no medications   - Scr is up to 1.9 last week, but usually in 1.5-1.6- US shows irregular contours typical of NSAID effect likely   - repeat labs in 3 months, if stable, will return in 1 year, but if not stable or worse, will return in 4-5 months  - feels fairly well overall.       # Hypertension: normotensive, not on medication    # Anemia in chronic renal disease:   - Hgb: Stable  - Iron studies: Not checked recently    # Electrolytes:   - Potassium; level: Normal    # Mineral Bone Disorder:   - Calcium; level is:  normal   PTH mildly elevated 108-   Vitamin D adequate  Check PTH every 4-6 months     Assessment and plan was discussed with patient and she voiced her understanding and agreement.    Consult:  Pebbles Powell was seen in consultation at the request of Harriet Da Silva for CKD management.    Reason for Visit:  Pebbles Powell is a 78 year old female with CKD from previous NSAID use, who presents for evaluation.    HPI:  She is a pleasant female with history of CKD who presents to establish care. She has had CKD known for many years. She was previously followed by Dr Manzo in [facility], seeing him yearly. She moved to the [facility] to [place] last year and is now looking to establish care. She states her CKD is thought due to NSAIDs. She had an US in 2009 that showed  normal kidney size 10/11 cm but irregular/ scarred appearance/ contour.   She has had issues with fecal incontinence, and has discussed this with her PCP. She has urinary incontinence also and had a bladder lift in the past.   She had a right knee replacement  She had a hysterectomy and then had oophorectomy due to cyst (not cancerous).  She has a few basal cell carcinoma, and has had appendectomy and tonsillectomy.  There is no family history of renal failure. Her father  of pancreatic cancer and her mother of dementia.    Renal History:   Kidney Disease and Medical Hx:  CKD Scr mid 1s    ROS:   A comprehensive review of systems was obtained and negative, except as noted in the HPI or PMH.    Active Medical Problems:  Patient Active Problem List   Diagnosis     CHF (Congestive Heart Failure), diastolic dysfunction     CKD (chronic kidney disease) stage 3, GFR 30-59 ml/min (H)     Pernicious anemia     Iron deficiency anemia     BCC (basal cell carcinoma), right lower leg     Vulvar itching     Mammogram declined     Moderate major depression (H)     Advance care planning     Total knee replacement status     Hypothyroidism     History of basal cell carcinoma     Porokeratosis     Anemia secondary to renal failure     PMH:   Medical record was reviewed and PMH was discussed with patient and noted below.  Past Medical History:   Diagnosis Date     CKD (chronic kidney disease)      PONV (postoperative nausea and vomiting)     usually has N&V with anesthesia     Porokeratosis      Renal disease      Sleep apnea      Unspecified hypothyroidism      PSH:   Past Surgical History:   Procedure Laterality Date     APPENDECTOMY       ARTHROPLASTY KNEE Right 2015    Procedure: ARTHROPLASTY KNEE;  Surgeon: Santino Storm MD;  [facility]     ARTHROSCOPY KNEE  2010    ARTHROSCOPY KNEE performed by BROOKLYNN PULIDO at [facility]     C ANTER VESICOURETHROPEXY,SIMPLE       COLONOSCOPY  2011     Procedure:COMBINED COLONOSCOPY, SINGLE BIOPSY/POLYPECTOMY BY BIOPSY; Surgeon:JEANNETTE WYNNE; [facility]     COLPORRHAPHY ANTERIOR       DILATION AND CURETTAGE       ENT SURGERY      T&A     ESOPHAGOSCOPY, GASTROSCOPY, DUODENOSCOPY (EGD), COMBINED  2011    Procedure:COMBINED ESOPHAGOSCOPY, GASTROSCOPY, DUODENOSCOPY (EGD), BIOPSY SINGLE OR MULTIPLE; MULTIPLE; Surgeon:JEANNETTE WYNNE; [facility]     EXCISE LESION LOWER EXTREMITY  2014    Procedure: EXCISE LESION LOWER EXTREMITY;  Surgeon: Santos Roberson MD;  [facility]     HEMORRHOIDECTOMY       HYSTERECTOMY, GABRIEL      fibroids     LAPAROTOMY, TUMOR DEBULKING, COMBINED  2012    Procedure:COMBINED LAPAROTOMY, TUMOR DEBULKING; Exploratory Laparotomy,  Bilateral Salpingo Oophorectomy; Surgeon:MIKIE LU; [facility]       Family Hx:   Family History   Problem Relation Age of Onset     Alcohol/Drug Mother      Obesity Mother      Alzheimer Disease Mother      Cancer Father         Pancreatic     Cardiovascular Father         heart disease     Alcohol/Drug Father      Personal Hx:   Social History     Tobacco Use     Smoking status: Former Smoker     Packs/day: 1.50     Years: 50.00     Pack years: 75.00     Types: Cigarettes     Last attempt to quit: 2007     Years since quittin.1     Smokeless tobacco: Never Used   Substance Use Topics     Alcohol use: No     Alcohol/week: 0.0 standard drinks     Comment: Alcoholic, treatment in  and        Allergies:  Allergies   Allergen Reactions     No Known Drug Allergies        Medications:  Current Outpatient Medications   Medication Sig     acetaminophen 650 MG TABS Take 650 mg by mouth every 6 hours     Cyanocobalamin (VITAMIN B 12 PO) Take 2,500 mcg by mouth     levothyroxine (SYNTHROID/LEVOTHROID) 112 MCG tablet Take 1 tablet (112 mcg) by mouth daily     Magnesium 500 MG CAPS Take 250 mg by mouth daily      MULTIVITAMINS OR TABS 1 TABLET DAILY brand varies per  pt     order for DME Equipment being ordered: toeless moderate compression socks 15-20 mmHG     Incontinence Supply Disposable (PREVAIL BLADDER CONTROL PADS) MISC      No current facility-administered medications for this visit.       Vitals:  /68 (BP Location: Right arm)   Pulse 78   Wt 73.5 kg (162 lb)   BMI 23.58 kg/m       Exam:  GENERAL APPEARANCE: alert and no distress  HENT: mouth without ulcers or lesions  LYMPHATICS: no cervical or supraclavicular nodes  RESP: lungs clear to auscultation - no rales, rhonchi or wheezes  CV: regular rhythm, normal rate, no rub, no murmur  EDEMA: no LE edema bilaterally  ABDOMEN: soft, nondistended, nontender, bowel sounds normal  MS: extremities normal - no gross deformities noted, no evidence of inflammation in joints, no muscle tenderness  SKIN: no rash    LABS:   CMP  Recent Labs   Lab Test 10/17/19  1259 07/12/19  0932 06/03/19  0952 03/20/19  0846    141 144 141   POTASSIUM 5.1 4.7 4.5 5.1   CHLORIDE 113* 112* 113* 109   CO2 26 23 26 25   ANIONGAP 4 6 5 7   GLC 93 90 77 91   BUN 37* 30 35* 35*   CR 1.90* 1.62* 1.74* 1.57*   GFRESTIMATED 25* 30* 28* 32*   GFRESTBLACK 29* 35* 32* 37*   GAL 9.2 8.8 8.6 8.9     Recent Labs   Lab Test 07/12/19  0932 07/05/18  1054 08/14/17  1055 10/05/16  1108 07/14/15  0644   BILITOTAL 0.2 0.5 0.3  --  0.2   ALKPHOS 80 63 70  --  60   ALT 19 18 16 21 13   AST 15 13 15  --  19     CBC  Recent Labs   Lab Test 10/17/19  1259 07/12/19  0932 03/20/19  0846 03/13/19  0930   HGB 9.5* 9.6* 10.0* 9.7*   WBC 6.6 6.6 6.4 5.3   RBC 3.20* 3.20* 3.27* 3.18*   HCT 30.0* 30.4* 31.4* 31.1*   MCV 94 95 96 98   MCH 29.7 30.0 30.6 30.5   MCHC 31.7 31.6 31.8 31.2*   RDW 13.8 13.5 12.9 13.2    159 178 156     URINE STUDIES  Recent Labs   Lab Test 10/17/19  1314 07/12/19  0930 03/13/19  0930 07/05/18  1333   COLOR Yellow Yellow Yellow Yellow   APPEARANCE Slightly Cloudy Cloudy Cloudy Clear   URINEGLC Negative Negative Negative Negative    URINEBILI Negative Negative Negative Negative   URINEKETONE Negative Negative Negative Negative   SG 1.010 1.015 1.015 <=1.005   UBLD Negative Trace* Trace* Negative   URINEPH 5.5 5.5 6.0 5.0   PROTEIN Negative Negative Negative Negative   UROBILINOGEN 0.2 0.2 0.2 0.2   NITRITE Negative Negative Negative Negative   LEUKEST Moderate* Large* Moderate* Small*   RBCU O - 2 O - 2 2-5* O - 2   WBCU * >100* >100* 0 - 5     Recent Labs   Lab Test 10/17/19  1313 11/10/17  1056 10/10/17  0925 10/19/15  0903   UTPG 0.17 0.14 0.54* 0.28*     PTH  Recent Labs   Lab Test 10/17/19  1259 10/11/18  0835 10/10/17  0901   PTHI 103* 65 88*     IRON STUDIES  Recent Labs   Lab Test 10/17/19  1259 03/13/19  0930 10/10/17  0902   IRON 37 67 38    280 261   IRONSAT 15 24 15   DANITA 118 112 84         Jazzmine Rohan Haines MD    Again, thank you for allowing me to participate in the care of your patient.      Sincerely,    Jazzmine Haines MD

## 2019-10-21 NOTE — PROGRESS NOTES
Assessment and Plan:  78 year old female who has CKD stage 3 who presents for evaluation. She has seen Dr Manzo in the past but recently moved to [place]. She has history of hypothyroidism, her baseline Scr recently is near 1.5-1.6  She has had iron deficiency and anemia.   # CKD:  Suspect due to NSAID use in the past, she is not hypertensive on no medications   - Scr is up to 1.9 last week, but usually in 1.5-1.6- US shows irregular contours typical of NSAID effect likely   - repeat labs in 3 months, if stable, will return in 1 year, but if not stable or worse, will return in 4-5 months  - feels fairly well overall.       # Hypertension: normotensive, not on medication    # Anemia in chronic renal disease:   - Hgb: Stable  - Iron studies: Not checked recently    # Electrolytes:   - Potassium; level: Normal    # Mineral Bone Disorder:   - Calcium; level is:  normal   PTH mildly elevated 108-   Vitamin D adequate  Check PTH every 4-6 months     Assessment and plan was discussed with patient and she voiced her understanding and agreement.    Consult:  Pebbles Powell was seen in consultation at the request of Harriet Da Silva for CKD management.    Reason for Visit:  Pebbles Powell is a 78 year old female with CKD from previous NSAID use, who presents for evaluation.    HPI:  She is a pleasant female with history of CKD who presents to establish care. She has had CKD known for many years. She was previously followed by Dr Manzo in [facility], seeing him yearly. She moved to the [facility] to [place] last year and is now looking to establish care. She states her CKD is thought due to NSAIDs. She had an US in 2009 that showed normal kidney size 10/11 cm but irregular/ scarred appearance/ contour.   She has had issues with fecal incontinence, and has discussed this with her PCP. She has urinary incontinence also and had a bladder lift in the past.   She had a right knee replacement  She had a hysterectomy and  then had oophorectomy due to cyst (not cancerous).  She has a few basal cell carcinoma, and has had appendectomy and tonsillectomy.  There is no family history of renal failure. Her father  of pancreatic cancer and her mother of dementia.    Renal History:   Kidney Disease and Medical Hx:  CKD Scr mid 1s    ROS:   A comprehensive review of systems was obtained and negative, except as noted in the HPI or PMH.    Active Medical Problems:  Patient Active Problem List   Diagnosis     CHF (Congestive Heart Failure), diastolic dysfunction     CKD (chronic kidney disease) stage 3, GFR 30-59 ml/min (H)     Pernicious anemia     Iron deficiency anemia     BCC (basal cell carcinoma), right lower leg     Vulvar itching     Mammogram declined     Moderate major depression (H)     Advance care planning     Total knee replacement status     Hypothyroidism     History of basal cell carcinoma     Porokeratosis     Anemia secondary to renal failure     PMH:   Medical record was reviewed and PMH was discussed with patient and noted below.  Past Medical History:   Diagnosis Date     CKD (chronic kidney disease)      PONV (postoperative nausea and vomiting)     usually has N&V with anesthesia     Porokeratosis      Renal disease      Sleep apnea      Unspecified hypothyroidism      PSH:   Past Surgical History:   Procedure Laterality Date     APPENDECTOMY       ARTHROPLASTY KNEE Right 2015    Procedure: ARTHROPLASTY KNEE;  Surgeon: Santino Storm MD;  Location: [facility]     ARTHROSCOPY KNEE  2010    ARTHROSCOPY KNEE performed by BROOKLYNN PULIDO at  OR     C ANTER VESICOURETHROPEXY,SIMPLE       COLONOSCOPY  2011    Procedure:COMBINED COLONOSCOPY, SINGLE BIOPSY/POLYPECTOMY BY BIOPSY; Surgeon:JEANNETTE WYNNE; Location: GI     COLPORRHAPHY ANTERIOR       DILATION AND CURETTAGE       ENT SURGERY      T&A     ESOPHAGOSCOPY, GASTROSCOPY, DUODENOSCOPY (EGD), COMBINED  2011    Procedure:COMBINED  ESOPHAGOSCOPY, GASTROSCOPY, DUODENOSCOPY (EGD), BIOPSY SINGLE OR MULTIPLE; MULTIPLE; Surgeon:JEANNETTE WYNNE; Location:[facility]     EXCISE LESION LOWER EXTREMITY  2014    Procedure: EXCISE LESION LOWER EXTREMITY;  Surgeon: Santos Roberson MD;  Location: [facility]     HEMORRHOIDECTOMY       HYSTERECTOMY, GABRIEL      fibroids     LAPAROTOMY, TUMOR DEBULKING, COMBINED  2012    Procedure:COMBINED LAPAROTOMY, TUMOR DEBULKING; Exploratory Laparotomy,  Bilateral Salpingo Oophorectomy; Surgeon:MIKIE LU; Location:[facility]       Family Hx:   Family History   Problem Relation Age of Onset     Alcohol/Drug Mother      Obesity Mother      Alzheimer Disease Mother      Cancer Father         Pancreatic     Cardiovascular Father         heart disease     Alcohol/Drug Father      Personal Hx:   Social History     Tobacco Use     Smoking status: Former Smoker     Packs/day: 1.50     Years: 50.00     Pack years: 75.00     Types: Cigarettes     Last attempt to quit: 2007     Years since quittin.1     Smokeless tobacco: Never Used   Substance Use Topics     Alcohol use: No     Alcohol/week: 0.0 standard drinks     Comment: Alcoholic, treatment in  and        Allergies:  Allergies   Allergen Reactions     No Known Drug Allergies        Medications:  Current Outpatient Medications   Medication Sig     acetaminophen 650 MG TABS Take 650 mg by mouth every 6 hours     Cyanocobalamin (VITAMIN B 12 PO) Take 2,500 mcg by mouth     levothyroxine (SYNTHROID/LEVOTHROID) 112 MCG tablet Take 1 tablet (112 mcg) by mouth daily     Magnesium 500 MG CAPS Take 250 mg by mouth daily      MULTIVITAMINS OR TABS 1 TABLET DAILY brand varies per pt     order for DME Equipment being ordered: toeless moderate compression socks 15-20 mmHG     Incontinence Supply Disposable (PREVAIL BLADDER CONTROL PADS) MISC      No current facility-administered medications for this visit.       Vitals:  /68 (BP Location: Right arm)   Pulse  78   Wt 73.5 kg (162 lb)   BMI 23.58 kg/m      Exam:  GENERAL APPEARANCE: alert and no distress  HENT: mouth without ulcers or lesions  LYMPHATICS: no cervical or supraclavicular nodes  RESP: lungs clear to auscultation - no rales, rhonchi or wheezes  CV: regular rhythm, normal rate, no rub, no murmur  EDEMA: no LE edema bilaterally  ABDOMEN: soft, nondistended, nontender, bowel sounds normal  MS: extremities normal - no gross deformities noted, no evidence of inflammation in joints, no muscle tenderness  SKIN: no rash    LABS:   CMP  Recent Labs   Lab Test 10/17/19  1259 07/12/19  0932 06/03/19  0952 03/20/19  0846    141 144 141   POTASSIUM 5.1 4.7 4.5 5.1   CHLORIDE 113* 112* 113* 109   CO2 26 23 26 25   ANIONGAP 4 6 5 7   GLC 93 90 77 91   BUN 37* 30 35* 35*   CR 1.90* 1.62* 1.74* 1.57*   GFRESTIMATED 25* 30* 28* 32*   GFRESTBLACK 29* 35* 32* 37*   GAL 9.2 8.8 8.6 8.9     Recent Labs   Lab Test 07/12/19  0932 07/05/18  1054 08/14/17  1055 10/05/16  1108 07/14/15  0644   BILITOTAL 0.2 0.5 0.3  --  0.2   ALKPHOS 80 63 70  --  60   ALT 19 18 16 21 13   AST 15 13 15  --  19     CBC  Recent Labs   Lab Test 10/17/19  1259 07/12/19  0932 03/20/19  0846 03/13/19  0930   HGB 9.5* 9.6* 10.0* 9.7*   WBC 6.6 6.6 6.4 5.3   RBC 3.20* 3.20* 3.27* 3.18*   HCT 30.0* 30.4* 31.4* 31.1*   MCV 94 95 96 98   MCH 29.7 30.0 30.6 30.5   MCHC 31.7 31.6 31.8 31.2*   RDW 13.8 13.5 12.9 13.2    159 178 156     URINE STUDIES  Recent Labs   Lab Test 10/17/19  1314 07/12/19  0930 03/13/19  0930 07/05/18  1333   COLOR Yellow Yellow Yellow Yellow   APPEARANCE Slightly Cloudy Cloudy Cloudy Clear   URINEGLC Negative Negative Negative Negative   URINEBILI Negative Negative Negative Negative   URINEKETONE Negative Negative Negative Negative   SG 1.010 1.015 1.015 <=1.005   UBLD Negative Trace* Trace* Negative   URINEPH 5.5 5.5 6.0 5.0   PROTEIN Negative Negative Negative Negative   UROBILINOGEN 0.2 0.2 0.2 0.2   NITRITE Negative  Negative Negative Negative   LEUKEST Moderate* Large* Moderate* Small*   RBCU O - 2 O - 2 2-5* O - 2   WBCU * >100* >100* 0 - 5     Recent Labs   Lab Test 10/17/19  1313 11/10/17  1056 10/10/17  0925 10/19/15  0903   UTPG 0.17 0.14 0.54* 0.28*     PTH  Recent Labs   Lab Test 10/17/19  1259 10/11/18  0835 10/10/17  0901   PTHI 103* 65 88*     IRON STUDIES  Recent Labs   Lab Test 10/17/19  1259 03/13/19  0930 10/10/17  0902   IRON 37 67 38    280 261   IRONSAT 15 24 15   DANITA 118 112 84         Jazzmine Rohan Haines MD

## 2019-10-22 ENCOUNTER — TELEPHONE (OUTPATIENT)
Dept: NEPHROLOGY | Facility: CLINIC | Age: 79
End: 2019-10-22

## 2019-10-22 NOTE — TELEPHONE ENCOUNTER
Per Dr. Haines, patient needs iron infusions. Call to [facility], scheduled for 10/28 at 1130 and 11.4 at 11am. Provided this information to patient in . Provided number to call back to reschedule if needed. [phone].  Codie Mace LPN  Nephrology  [phone]

## 2019-10-28 ENCOUNTER — INFUSION THERAPY VISIT (OUTPATIENT)
Dept: INFUSION THERAPY | Facility: CLINIC | Age: 79
End: 2019-10-28
Payer: COMMERCIAL

## 2019-10-28 VITALS
TEMPERATURE: 97.5 F | WEIGHT: 168 LBS | DIASTOLIC BLOOD PRESSURE: 71 MMHG | HEART RATE: 78 BPM | OXYGEN SATURATION: 99 % | RESPIRATION RATE: 18 BRPM | BODY MASS INDEX: 24.45 KG/M2 | SYSTOLIC BLOOD PRESSURE: 136 MMHG

## 2019-10-28 DIAGNOSIS — N18.9 ANEMIA SECONDARY TO RENAL FAILURE: Primary | ICD-10-CM

## 2019-10-28 DIAGNOSIS — D50.9 IRON DEFICIENCY ANEMIA, UNSPECIFIED IRON DEFICIENCY ANEMIA TYPE: ICD-10-CM

## 2019-10-28 DIAGNOSIS — D63.1 ANEMIA SECONDARY TO RENAL FAILURE: Primary | ICD-10-CM

## 2019-10-28 DIAGNOSIS — N18.30 CKD (CHRONIC KIDNEY DISEASE) STAGE 3, GFR 30-59 ML/MIN (H): ICD-10-CM

## 2019-10-28 PROCEDURE — 99207 ZZC NO CHARGE NURSE ONLY: CPT

## 2019-10-28 PROCEDURE — 96374 THER/PROPH/DIAG INJ IV PUSH: CPT | Performed by: NURSE PRACTITIONER

## 2019-10-28 RX ORDER — HEPARIN SODIUM,PORCINE 10 UNIT/ML
5 VIAL (ML) INTRAVENOUS
Status: CANCELLED | OUTPATIENT
Start: 2019-11-04

## 2019-10-28 RX ORDER — HEPARIN SODIUM (PORCINE) LOCK FLUSH IV SOLN 100 UNIT/ML 100 UNIT/ML
5 SOLUTION INTRAVENOUS
Status: CANCELLED | OUTPATIENT
Start: 2019-11-04

## 2019-10-28 RX ADMIN — Medication 250 ML: at 12:25

## 2019-10-28 ASSESSMENT — PAIN SCALES - GENERAL: PAINLEVEL: NO PAIN (0)

## 2019-10-28 NOTE — PROGRESS NOTES
"Infusion Nursing Note:  Pebbles Powell presents today for New Injectafer.    Patient seen by provider today: No   present during visit today: Not Applicable.    Note: Pt states she has had iron in the past - not sure what medication it was, denies any problems and states, \"It did help with my fatigue for a while considering my chronic kidney disease.\"  Oriented to Infusion Center, ll questions answered, pt verbalized understanding to call this RN with any problems today.     Intravenous Access:  Peripheral IV placed.    Treatment Conditions:  Not Applicable.      Post Infusion Assessment:  Patient tolerated infusion without incident.  Patient observed for 30 minutes post Injectafer per protocol.  Site patent and intact, free from redness, edema or discomfort.  No evidence of extravasations.  Access discontinued per protocol.       Discharge Plan:   Return 11/04/19 for 2nd dose of Injectafer  Discharge instructions reviewed with: Patient.  Patient and/or family verbalized understanding of discharge instructions and all questions answered.  Patient discharged in stable condition accompanied by: self.  Departure Mode: Ambulatory.    Susana Hernández RN                      "

## 2019-11-04 ENCOUNTER — INFUSION THERAPY VISIT (OUTPATIENT)
Dept: INFUSION THERAPY | Facility: CLINIC | Age: 79
End: 2019-11-04
Payer: COMMERCIAL

## 2019-11-04 VITALS
SYSTOLIC BLOOD PRESSURE: 123 MMHG | TEMPERATURE: 98.4 F | HEART RATE: 74 BPM | DIASTOLIC BLOOD PRESSURE: 68 MMHG | OXYGEN SATURATION: 99 % | RESPIRATION RATE: 14 BRPM

## 2019-11-04 DIAGNOSIS — N18.30 CKD (CHRONIC KIDNEY DISEASE) STAGE 3, GFR 30-59 ML/MIN (H): ICD-10-CM

## 2019-11-04 DIAGNOSIS — N18.9 ANEMIA SECONDARY TO RENAL FAILURE: Primary | ICD-10-CM

## 2019-11-04 DIAGNOSIS — D63.1 ANEMIA SECONDARY TO RENAL FAILURE: Primary | ICD-10-CM

## 2019-11-04 DIAGNOSIS — D50.9 IRON DEFICIENCY ANEMIA, UNSPECIFIED IRON DEFICIENCY ANEMIA TYPE: ICD-10-CM

## 2019-11-04 PROCEDURE — 99207 ZZC NO CHARGE NURSE ONLY: CPT

## 2019-11-04 PROCEDURE — 96365 THER/PROPH/DIAG IV INF INIT: CPT | Performed by: NURSE PRACTITIONER

## 2019-11-04 RX ORDER — HEPARIN SODIUM (PORCINE) LOCK FLUSH IV SOLN 100 UNIT/ML 100 UNIT/ML
5 SOLUTION INTRAVENOUS
Status: CANCELLED | OUTPATIENT
Start: 2019-11-04

## 2019-11-04 RX ORDER — HEPARIN SODIUM,PORCINE 10 UNIT/ML
5 VIAL (ML) INTRAVENOUS
Status: CANCELLED | OUTPATIENT
Start: 2019-11-04

## 2019-11-04 RX ADMIN — Medication 250 ML: at 10:51

## 2019-11-04 ASSESSMENT — PAIN SCALES - GENERAL: PAINLEVEL: NO PAIN (0)

## 2019-11-04 NOTE — PROGRESS NOTES
Infusion Nursing Note:  Pebbles Powell presents today for Injectafer 2/2.    Patient seen by provider today: No   present during visit today: Not Applicable.    Note: N/A.    Intravenous Access:  Peripheral IV placed.    Treatment Conditions:  Not Applicable.      Post Infusion Assessment:  Patient tolerated infusion without incident.  Patient observed for 30 minutes post Injectafer per protocol.  Site patent and intact, free from redness, edema or discomfort.  No evidence of extravasations.  Access discontinued per protocol.       Discharge Plan:   Patient will return as directed for next appointment.   Patient discharged in stable condition accompanied by: self.  Departure Mode: Ambulatory.    Arlene Jolley, RN-BSN, PHN, OCN  [facility]

## 2019-11-08 ENCOUNTER — HEALTH MAINTENANCE LETTER (OUTPATIENT)
Age: 79
End: 2019-11-08

## 2020-02-12 ENCOUNTER — OFFICE VISIT (OUTPATIENT)
Dept: FAMILY MEDICINE | Facility: CLINIC | Age: 80
End: 2020-02-12
Payer: COMMERCIAL

## 2020-02-12 ENCOUNTER — ANCILLARY PROCEDURE (OUTPATIENT)
Dept: GENERAL RADIOLOGY | Facility: CLINIC | Age: 80
End: 2020-02-12
Attending: NURSE PRACTITIONER
Payer: COMMERCIAL

## 2020-02-12 VITALS
HEIGHT: 69 IN | SYSTOLIC BLOOD PRESSURE: 124 MMHG | RESPIRATION RATE: 18 BRPM | WEIGHT: 168 LBS | DIASTOLIC BLOOD PRESSURE: 68 MMHG | BODY MASS INDEX: 24.88 KG/M2 | TEMPERATURE: 98.4 F | OXYGEN SATURATION: 100 % | HEART RATE: 75 BPM

## 2020-02-12 DIAGNOSIS — K59.00 CONSTIPATION, UNSPECIFIED CONSTIPATION TYPE: ICD-10-CM

## 2020-02-12 DIAGNOSIS — R19.7 DIARRHEA, UNSPECIFIED TYPE: ICD-10-CM

## 2020-02-12 DIAGNOSIS — M54.50 ACUTE BILATERAL LOW BACK PAIN WITHOUT SCIATICA: Primary | ICD-10-CM

## 2020-02-12 PROCEDURE — 99213 OFFICE O/P EST LOW 20 MIN: CPT | Performed by: NURSE PRACTITIONER

## 2020-02-12 PROCEDURE — 72220 X-RAY EXAM SACRUM TAILBONE: CPT

## 2020-02-12 ASSESSMENT — MIFFLIN-ST. JEOR: SCORE: 1311.42

## 2020-02-12 ASSESSMENT — PATIENT HEALTH QUESTIONNAIRE - PHQ9: SUM OF ALL RESPONSES TO PHQ QUESTIONS 1-9: 2

## 2020-02-12 NOTE — PATIENT INSTRUCTIONS
I recommend metamucil fiber daily to help with constipation.   Constipation (Adult)  Constipation means that you have bowel movements that are less frequent than usual. Stools often become very hard and difficult to pass.  Constipation is very common. At some point in life, it affects almost everyone. Since everyone's bowel habits are different, what is constipation to one person may not be to another. Your healthcare provider may do tests to diagnose constipation. It depends on what he or she finds when evaluating you.    Symptoms of constipation include:    Abdominal pain    Bloating    Vomiting    Painful bowel movements    Itching, swelling, bleeding, or pain around the anus  Causes  Constipation can have many causes. These include:    Diet low in fiber    Too much dairy    Not drinking enough liquids    Lack of exercise or physical activity (especially true for older adults)    Changes in lifestyle or daily routine, including pregnancy, aging, work, and travel    Frequent use or misuse of laxatives    Ignoring the urge to have a bowel movement or delaying it until later    Medicines, such as certain prescription pain medicines, iron supplements, antacids, certain antidepressants, and calcium supplements    Diseases like irritable bowel syndrome, bowel obstructions, stroke, diabetes, thyroid disease, Parkinson disease, hemorrhoids, and colon cancer  Complications  Potential complications of constipation can include:    Hemorrhoids    Rectal bleeding from hemorrhoids or anal fissures (skin tears)    Hernias    Dependency on laxatives    Chronic constipation    Fecal impaction, a severe form of constipation in which a large amount of hard stool is in your rectum that you can't pass    Bowel obstruction or perforation  Home care  All treatment should be done after talking with your healthcare provider. This is especially true if you have another medical problems, are taking prescription medicines, or are an older  adult. Treatment most often involves lifestyle changes. You may also need medicines. Your healthcare provider will tell you which will work best for you. Follow the advice below to help avoid this problem in the future.  Lifestyle changes  These lifestyle changes can help prevent constipation:    Diet. Eat a high-fiber diet, with fresh fruit and vegetables, and reduce dairy intake, meats, and processed foods    Fluids. It's important to get enough fluids each day. Drink plenty of water when you eat more fiber. If you are on diet that limits the amount of fluid you can have, talk about this with your healthcare provider.    Regular exercise. Check with your healthcare provider first.  Medicines  Take any medicines as directed. Some laxatives are safe to use only every now and then. Others can be taken on a regular basis. While laxatives don't cause bowel dependence, they are treating the symptoms. So your constipation may return if you don't make other changes. Talk with your healthcare provider or pharmacist if you have questions.  Prescription pain medicines can cause constipation. If you are taking this kind of medicine, ask your healthcare provider if you should also take a stool softener.  Medicines you may take to treat constipation include:    Fiber supplements    Stool softeners    Laxatives    Enemas    Rectal suppositories  Follow-up care  Follow up with your healthcare provider if symptoms don't get better in the next few days. You may need to have more tests or see a specialist.  Call 911  Call 911 if any of these occur:    Trouble breathing    Stiff, rigid abdomen that is severely painful to touch    Confusion    Fainting or loss of consciousness    Rapid heart rate    Chest pain  When to seek medical advice  Call your healthcare provider right away if any of these occur:    Fever of 100.4 F (38 C) or higher, or as directed by your healthcare provider    Failure to resume normal bowel movements    Pain  in your abdomen or back gets worse    Nausea or vomiting    Swelling in your abdomen    Blood in the stool    Black, tarry stool    Involuntary weight loss    Weakness  Date Last Reviewed: 6/1/2018 2000-2019 [employer]. [address] [place] [address], [place] [address]. All rights reserved. This information is not intended as a substitute for professional medical care. Always follow your healthcare professional's instructions.           Patient Education   When You Have Low Back Pain  Caring for Your Back  You are not alone.  Low back pain is very common. Nearly half of all adults have low back pain in any given year.  The good news is that back pain is rarely a danger to your health. Most people can manage their back pain on their own and about half of them start feeling better within 2 weeks. In 9 out of 10 cases, low back pain goes away or no longer limits daily activity within 6 weeks.  Your outlook is good!  Your symptoms tell us that your low back pain is most likely not a danger to you. Most of the time we do not know the exact cause of low back pain, even if you see a doctor or have an MRI. However, treatment can still work without knowing the cause of the pain. Less than 1 in 100 people need surgery for their back pain.  What can I do about my low back pain?  There are three things you can do to ease low back pain and help it go away.    Use heat or cold packs.    Take medicine as directed.    Use positions, movements and exercises.  Using heat or cold packs  Try cold packs or gentle heat to ease your pain. Use whichever gives you the most relief. Apply the cold pack or heat for 15 minutes at a time, as often as needed.  Taking medicine    If your doctor has prescribed medicine, be sure to follow the directions.    If you take over-the-counter medicine, read and follow the directions.    Talk to your doctor if you have any questions.  Using positions, movements and exercises  Research tells us  that moving your joints and muscles can help you recover from back pain. Such activity should be simple and gentle.  Use the positions in the photos as well as walking to help relieve your pain. Try taking a short walk every 3 to 4 hours during the day. Walk for a few minutes inside your home or take longer walks outside, on a treadmill or at a mall. Slowly increase the amount of time you walk.  Expect discomfort when you begin, but it should lessen as your back starts to heal. When your back feels better, walk daily to keep your back and body healthy.  Finding a comfortable position  When your back pain is new, certain positions will ease your pain. Gently try each of the positions below until you find one that is helpful. Once you find a position of comfort, use it as often as you like when you are resting. You will recover faster if you combine rest with activity.         When should I call my doctor?  Your back pain should improve over the first couple of weeks. As it improves, you should be able to return to your normal activities. But call your doctor if:    You have a sudden change in your ability to control?your bladder or bowels.    You feel tingling in your groin or legs.    The pain spreads down your leg and into your foot.    Your toes, feet or leg muscles feel weak.    You feel generally unwell or sick.    Your pain does not get better or gets worse.    For informational purposes only. Not to replace the advice of your health care provider.  Copyright   2013 [employer]. All rights reserved. [employer] 745544 - REV 03/16.

## 2020-02-12 NOTE — PROGRESS NOTES
Subjective     Pebbles Powell is a 79 year old female who presents to clinic today for the following health issues:    HPI   Back Pain       Duration: x 1 week        Specific cause: none    Description:   Location of pain: low back/buttock area and tailbone  Character of pain: dull ache  Pain radiation:none  New numbness or weakness in legs, not attributed to pain:  no     Intensity: moderate    History:   Pain interferes with job: Not applicable  History of back problems: recurrent self limited episodes of low back pain in the past, this feels different  Any previous MRI or X-rays: None  Sees a specialist for back pain:  No  Therapies tried without relief: nothing    Alleviating factors:   Improved by: acetaminophen (Tylenol). States that she bought a new mattress a week ago and it is helping but she still has pain with pressure on her back and with bending.     Precipitating factors:  Worsened by: Bending, sitting    Accompanying Signs & Symptoms:  Risk of Fracture:  No   Risk of Cauda Equina:  None  Risk of Infection:  None  Risk of Cancer:  None  Risk of Ankylosing Spondylitis:  Onset at age <35, male, AND morning back stiffness. no     Diarrhea      Duration: intermittent for many years and also does constipated off and on as well    Description:       Consistency of stool: runny and loose       Blood in stool: no        Number of loose stools past 24 hours: 2       States that she is incontinent of stool about 3/week    Intensity:  moderate    Accompanying signs and symptoms:       Fever: no        Nausea/vomitting: no        Abdominal pain: no        Weight loss: no     History (recent antibiotics or travel/ill contacts/med changes/testing done): None    Precipitating or alleviating factors: None    Therapies tried and outcome: PeptoBismol, and OTC laxative pill when constipated.       Patient Active Problem List   Diagnosis     CHF (Congestive Heart Failure), diastolic dysfunction     CKD (chronic kidney  disease) stage 3, GFR 30-59 ml/min (H)     Pernicious anemia     Iron deficiency anemia     BCC (basal cell carcinoma), right lower leg     Vulvar itching     Mammogram declined     Moderate major depression (H)     Advance care planning     Total knee replacement status     Hypothyroidism     History of basal cell carcinoma     Porokeratosis     Anemia secondary to renal failure     Past Surgical History:   Procedure Laterality Date     APPENDECTOMY       ARTHROPLASTY KNEE Right 2015    Procedure: ARTHROPLASTY KNEE;  Surgeon: Santino Storm MD;  [facility]: [facility]     ARTHROSCOPY KNEE  2010    ARTHROSCOPY KNEE performed by BR[facility]NN PULIDO at [facility]     C ANTER VESICOURETHROPEXY,SIMPLE       COLONOSCOPY  2011    Procedure:COMBINED COLONOSCOPY, SINGLE BIOPSY/POLYPECTOMY BY BIOPSY; Surgeon:JEANNETTE WYNNE; Location: [facility]     COLPORRHAPHY ANTERIOR       DILATION AND CURETTAGE       ENT SURGERY      T&A     ESOPHAGOSCOPY, GASTROSCOPY, DUODENOSCOPY (EGD), COMBINED  2011    Procedure:COMBINED ESOPHAGOSCOPY, GASTROSCOPY, DUODENOSCOPY (EGD), BIOPSY SINGLE OR MULTIPLE; MULTIPLE; Surgeon:JEANNETTE WYNNE; Location: GI     EXCISE LESION LOWER EXTREMITY  2014    Procedure: EXCISE LESION LOWER EXTREMITY;  Surgeon: Santos Roberson MD;  Location: [facility]     HEMORRHOIDECTOMY       HYSTERECTOMY, GABRIEL      fibroids     LAPAROTOMY, TUMOR DEBULKING, COMBINED  2012    Procedure:COMBINED LAPAROTOMY, TUMOR DEBULKING; Exploratory Laparotomy,  Bilateral Salpingo Oophorectomy; Surgeon:MIKIE LU; Location:[facility]       Social History     Tobacco Use     Smoking status: Former Smoker     Packs/day: 1.50     Years: 50.00     Pack years: 75.00     Types: Cigarettes     Last attempt to quit: 2007     Years since quittin.4     Smokeless tobacco: Never Used   Substance Use Topics     Alcohol use: No     Alcohol/week: 0.0 standard drinks     Comment: Alcoholic, treatment in  and      " Family History   Problem Relation Age of Onset     Alcohol/Drug Mother      Obesity Mother      Alzheimer Disease Mother      Cancer Father         Pancreatic     Cardiovascular Father         heart disease     Alcohol/Drug Father          Current Outpatient Medications   Medication Sig Dispense Refill     acetaminophen 650 MG TABS Take 650 mg by mouth every 6 hours 100 tablet 1     Cyanocobalamin (VITAMIN B 12 PO) Take 2,500 mcg by mouth       Incontinence Supply Disposable (PREVAIL BLADDER CONTROL PADS) MISC        levothyroxine (SYNTHROID/LEVOTHROID) 112 MCG tablet Take 1 tablet (112 mcg) by mouth daily 90 tablet 3     Magnesium 500 MG CAPS Take 250 mg by mouth daily        MULTIVITAMINS OR TABS 1 TABLET DAILY brand varies per pt       order for DME Equipment being ordered: toeless moderate compression socks 15-20 mmHG 1 each 1     Allergies   Allergen Reactions     No Known Drug Allergies        Reviewed and updated as needed this visit by Provider  Tobacco  Allergies  Meds  Problems  Med Hx  Surg Hx  Fam Hx         Review of Systems   ROS COMP: Constitutional, HEENT, cardiovascular, pulmonary, gi and gu systems are negative, except as otherwise noted.      Objective    /68   Pulse 75   Temp 98.4  F (36.9  C) (Oral)   Resp 18   Ht 1.753 m (5' 9\")   Wt 76.2 kg (168 lb)   SpO2 100%   BMI 24.81 kg/m    Body mass index is 24.81 kg/m .  Physical Exam   GENERAL: healthy, alert and no distress  EYES: Eyes grossly normal to inspection, PERRL and conjunctivae and sclerae normal  HENT: ear canals and TM's normal, nose and mouth without ulcers or lesions  NECK: no adenopathy, no asymmetry, masses, or scars and thyroid normal to palpation  RESP: lungs clear to auscultation - no rales, rhonchi or wheezes  CV: regular rate and rhythm, normal S1 S2, no S3 or S4, no murmur, click or rub, no peripheral edema and peripheral pulses strong  ABDOMEN: soft, nontender, no hepatosplenomegaly, no masses and bowel " sounds normal, no CVA tenderness  MS: decreased range of motion with rotation of back, tenderness to palpation of bilateral SI, and spine exam shows no scoliosis  SKIN: no suspicious lesions or rashes  NEURO: Normal strength and tone, mentation intact and speech normal  PSYCH: mentation appears normal, affect normal/bright      Assessment & Plan     1. Acute bilateral low back pain without sciatica  - XR Sacrum and Coccyx 2 Views    2. Diarrhea, unspecified type  - GASTROENTEROLOGY ADULT REF CONSULT ONLY    3. Constipation, unspecified constipation type  - GASTROENTEROLOGY ADULT REF CONSULT ONLY  - recommend Metamucil fiber daily to help with constipation     Return if symptoms worsen or fail to improve.    Leatha Weaver, [facility]

## 2020-02-19 ENCOUNTER — THERAPY VISIT (OUTPATIENT)
Dept: CHIROPRACTIC MEDICINE | Facility: CLINIC | Age: 80
End: 2020-02-19
Payer: COMMERCIAL

## 2020-02-19 DIAGNOSIS — M54.50 CHRONIC LOW BACK PAIN: ICD-10-CM

## 2020-02-19 DIAGNOSIS — M99.03 SEGMENTAL DYSFUNCTION OF LUMBAR REGION: ICD-10-CM

## 2020-02-19 DIAGNOSIS — G89.29 CHRONIC LOW BACK PAIN: ICD-10-CM

## 2020-02-19 DIAGNOSIS — M62.838 SPASM OF MUSCLE: ICD-10-CM

## 2020-02-19 DIAGNOSIS — M99.05 SEGMENTAL DYSFUNCTION OF PELVIC REGION: Primary | ICD-10-CM

## 2020-02-19 PROCEDURE — 99203 OFFICE O/P NEW LOW 30 MIN: CPT | Mod: GY | Performed by: CHIROPRACTOR

## 2020-02-19 PROCEDURE — 98940 CHIROPRACT MANJ 1-2 REGIONS: CPT | Mod: AT | Performed by: CHIROPRACTOR

## 2020-02-19 NOTE — PROGRESS NOTES
Initial Chiropractic Clinic Visit    PCP: No Ref-Primary, Physician    Pebbles Powell is a 79 year old female who is seen  as a self referral presenting with low back pain . Patient reports that the onset was insidious about 2 years ago. When asked, patient denies:, falling, slipping, bending and reaching or sleeping awkwardly. Pebbles reports that she has been having low back pain for the past 2 years.  The pain is located in her lower right lumbar spine. She rate the pain at a 4 out of 10 and describes it as a dull achy pain with periods of sharpness depending on movement/position.  There are no radiating symptoms and her sleep is not interrupted but she does have worse symptoms in the am upon waking.    Injury: none    Location of Pain: right, lower lumbar at the following level(s) L4 , L5  and PSIS Right   Duration of Pain: 2 year(s)  Rating of Pain at worst: 6/10  Rating of Pain Currently: 4/10  Symptoms are better with: tylenol  Symptoms are worse with: sleep  Additional Features:      Health History  as reported by the patient:    How does the patient rate their own health:   Good    Current or past medical history:   Anemia, Heart problems, Incontinence, Kidney disease and Thyroid problems    Medical allergies  None    Past Traumas/Surgeries  Cancer:  Basal cell    Family History  The family history includes Alcohol/Drug in her father and mother; Alzheimer Disease in her mother; Cancer in her father; Cardiovascular in her father; Obesity in her mother.    Medications:  Anti-inflammatory and Thyroid    Occupation:  retired    Primary job tasks:   Computer work, Driving, Lifting/carrying and Prolonged sitting    Barriers as home/work:   none    Additional health Issues:                 Pebbles was asked to complete the Oswestry Low Back Disability Index and Amanda Start Back screening tool, today in the office.  Disability score: 9%. Keel Start Total Score:3 Sub Score: 1     Review of Systems  Musculoskeletal:  as above  Remainder of review of systems is negative including constitutional, CV, pulmonary, GI, Skin and Neurologic except as noted in HPI or medical history.    Past Medical History:   Diagnosis Date     CKD (chronic kidney disease)      PONV (postoperative nausea and vomiting)     usually has N&V with anesthesia     Porokeratosis      Renal disease      Sleep apnea      Unspecified hypothyroidism      Past Surgical History:   Procedure Laterality Date     APPENDECTOMY       ARTHROPLASTY KNEE Right 7/13/2015    Procedure: ARTHROPLASTY KNEE;  Surgeon: Santino Storm MD;  [facility]     ARTHROSCOPY KNEE  11/23/2010    ARTHROSCOPY KNEE performed by BROOKLYNN PULIDO at  OR     C ANTER VESICOURETHROPEXY,SIMPLE       COLONOSCOPY  6/1/2011    Procedure:COMBINED COLONOSCOPY, SINGLE BIOPSY/POLYPECTOMY BY BIOPSY; Surgeon:JEANNETTE WYNNE; Location: GI     COLPORRHAPHY ANTERIOR       DILATION AND CURETTAGE       ENT SURGERY      T&A     ESOPHAGOSCOPY, GASTROSCOPY, DUODENOSCOPY (EGD), COMBINED  6/1/2011    Procedure:COMBINED ESOPHAGOSCOPY, GASTROSCOPY, DUODENOSCOPY (EGD), BIOPSY SINGLE OR MULTIPLE; MULTIPLE; Surgeon:JEANNETTE WYNNE; Location: GI     EXCISE LESION LOWER EXTREMITY  7/18/2014    Procedure: EXCISE LESION LOWER EXTREMITY;  Surgeon: Santos Roberson MD;  Location:  OR     HEMORRHOIDECTOMY       HYSTERECTOMY, GABRIEL      fibroids     LAPAROTOMY, TUMOR DEBULKING, COMBINED  2/9/2012    Procedure:COMBINED LAPAROTOMY, TUMOR DEBULKING; Exploratory Laparotomy,  Bilateral Salpingo Oophorectomy; Surgeon:MIKIE LU; [facility]     Objective  There were no vitals taken for this visit.      GENERAL APPEARANCE: healthy, alert and no distress   GAIT: NORMAL  SKIN: no suspicious lesions or rashes  NEURO: Normal strength and tone, mentation intact and speech normal  PSYCH:  mentation appears normal and affect normal/bright    Low back exam:    Inspection:       Slight forward leaning posture  and high R ilium    ROM:       limited flexion due to pain       limited extension due to pain    Tender:       paraspinal muscles    Non Tender:       remainder of lumbar spine    Strength:       hip flexion 5/5 Normal       knee extension 5/5 Normal       ankle dorsiflexion 5/5 Normal       ankle plantarflexion 5/5 Normal       dorsiflexion of the great toe 5/5 Normal    Reflexes:      Sensation:      grossly intact throughout lower extremities    Special tests:  Yeoman's - Right negative and Left negative, Javier - Right negative and Left negative and Ely's - Right negative and Left negative    Segmental spinal dysfunction/restrictions found at:  L4 , L5  and PSIS Right     The following soft tissue hypotonicities were observed:Piriformis: right, referred pain: no    Trigger points were found in:Lumbar erector spine and Piriformis    Muscle spasm found in:Lumbar erector spine and Piriformis      Radiology:  SACRUM AND COCCYX TWO VIEWS February 12, 2020 12:12 PM      HISTORY: Acute bilateral low back pain without sciatica.     COMPARISON: None.                                                                      IMPRESSION: No acute-appearing bony abnormality. Mild degenerative  changes at the sacroiliac joints bilaterally. Vascular calcifications.       Assessment:    1. Segmental dysfunction of pelvic region    2. Chronic low back pain    3. Segmental dysfunction of lumbar region    4. Spasm of muscle        RX ordered/plan of care  Anticipated outcomes  Possible risks and side effects    After discussing the risk and benefits of care, patient consented to treatment    Prognosis: Good      Patient's condition:  Patient had restrictions pre-manipulation    Treatment effectiveness:  Post manipulation there is better intersegmental movement and Patient claims to feel looser post manipulation      Plan:    Procedures:  Evaluation and Management:  11980 Moderate level exam 30 min    CMT:  55547 Chiropractic  manipulative treatment 1-2 regions performed   Lumbar: Activator, L4, L5, Prone  Pelvis: Activator, PSIS Right , Prone    Modalities:  78553: MSTM:  To Lumbar erector spine and Piriformis  for 5 min    Therapeutic procedures:  None    Response to Treatment  Reduction in symptoms as reported by patient      Treatment plan and goals:  Goals:  SITTING: the patient specific goal is to attain pre-injury status of  2 hours comfortably  PAOLA: reduce PAOLA from 9 to 0    Frequency of care  Duration of care is estimated to be 8 weeks, from the initial treatment.  It is estimated that the patient will need a total of 8 visits to resolve this episode.  For the initial therapeutic trial of care, the frequency is recommended at 1 X week, once daily.  A reevaluation would be clinically appropriate in 8 visits, to determine progress and further course of care.    In-Office Treatment  Evaluation  Spinal Chiropractic Manipulative Therapy:    Modalities: MSTM        Recommendations:    Instructions:  ice 20 minutes every other hour as needed    Follow-up:    Return to care in one week.       Discussed the assessment with the patient.      Disclaimer: This note consists of symbols derived from keyboarding, dictation and/or voice recognition software. As a result, there may be errors in the script that have gone undetected. Please consider this when interpreting information found in this chart.

## 2020-02-23 ENCOUNTER — HEALTH MAINTENANCE LETTER (OUTPATIENT)
Age: 80
End: 2020-02-23

## 2020-02-25 ENCOUNTER — THERAPY VISIT (OUTPATIENT)
Dept: CHIROPRACTIC MEDICINE | Facility: CLINIC | Age: 80
End: 2020-02-25
Payer: COMMERCIAL

## 2020-02-25 DIAGNOSIS — M99.02 SEGMENTAL DYSFUNCTION OF THORACIC REGION: ICD-10-CM

## 2020-02-25 DIAGNOSIS — N18.30 CHRONIC KIDNEY DISEASE, STAGE III (MODERATE) (H): ICD-10-CM

## 2020-02-25 DIAGNOSIS — M99.03 SEGMENTAL DYSFUNCTION OF LUMBAR REGION: ICD-10-CM

## 2020-02-25 DIAGNOSIS — M62.838 SPASM OF RIGHT PIRIFORMIS MUSCLE: ICD-10-CM

## 2020-02-25 DIAGNOSIS — D50.9 IRON DEFICIENCY ANEMIA, UNSPECIFIED IRON DEFICIENCY ANEMIA TYPE: ICD-10-CM

## 2020-02-25 DIAGNOSIS — D63.1 ANEMIA SECONDARY TO RENAL FAILURE: ICD-10-CM

## 2020-02-25 DIAGNOSIS — M99.05 SEGMENTAL DYSFUNCTION OF PELVIC REGION: Primary | ICD-10-CM

## 2020-02-25 DIAGNOSIS — M54.50 LOW BACK ACHE: ICD-10-CM

## 2020-02-25 DIAGNOSIS — N18.9 ANEMIA SECONDARY TO RENAL FAILURE: ICD-10-CM

## 2020-02-25 DIAGNOSIS — M62.830 LUMBAR PARASPINAL MUSCLE SPASM: ICD-10-CM

## 2020-02-25 LAB
ALBUMIN SERPL-MCNC: 3.1 G/DL (ref 3.4–5)
ANION GAP SERPL CALCULATED.3IONS-SCNC: 6 MMOL/L (ref 3–14)
ANISOCYTOSIS BLD QL SMEAR: SLIGHT
BUN SERPL-MCNC: 33 MG/DL (ref 7–30)
CALCIUM SERPL-MCNC: 8.7 MG/DL (ref 8.5–10.1)
CHLORIDE SERPL-SCNC: 114 MMOL/L (ref 94–109)
CO2 SERPL-SCNC: 22 MMOL/L (ref 20–32)
CREAT SERPL-MCNC: 1.54 MG/DL (ref 0.52–1.04)
DIFFERENTIAL METHOD BLD: ABNORMAL
ERYTHROCYTE [DISTWIDTH] IN BLOOD BY AUTOMATED COUNT: 13.3 % (ref 10–15)
ERYTHROCYTE [DISTWIDTH] IN BLOOD BY AUTOMATED COUNT: NORMAL % (ref 10–15)
FERRITIN SERPL-MCNC: 360 NG/ML (ref 8–252)
GFR SERPL CREATININE-BSD FRML MDRD: 32 ML/MIN/{1.73_M2}
GLUCOSE SERPL-MCNC: 83 MG/DL (ref 70–99)
HCT VFR BLD AUTO: 29.9 % (ref 35–47)
HCT VFR BLD AUTO: NORMAL % (ref 35–47)
HGB BLD-MCNC: 9.6 G/DL (ref 11.7–15.7)
HGB BLD-MCNC: NORMAL G/DL (ref 11.7–15.7)
IRON SATN MFR SERPL: 33 % (ref 15–46)
IRON SERPL-MCNC: 74 UG/DL (ref 35–180)
LYMPHOCYTES # BLD AUTO: 3.1 10E9/L (ref 0.8–5.3)
LYMPHOCYTES NFR BLD AUTO: 59 %
MAGNESIUM SERPL-MCNC: 2.1 MG/DL (ref 1.6–2.3)
MCH RBC QN AUTO: 31 PG (ref 26.5–33)
MCH RBC QN AUTO: NORMAL PG (ref 26.5–33)
MCHC RBC AUTO-ENTMCNC: 32.1 G/DL (ref 31.5–36.5)
MCHC RBC AUTO-ENTMCNC: NORMAL G/DL (ref 31.5–36.5)
MCV RBC AUTO: 97 FL (ref 78–100)
MCV RBC AUTO: NORMAL FL (ref 78–100)
MONOCYTES # BLD AUTO: 0.7 10E9/L (ref 0–1.3)
MONOCYTES NFR BLD AUTO: 13 %
NEUTROPHILS # BLD AUTO: 1.5 10E9/L (ref 1.6–8.3)
NEUTROPHILS NFR BLD AUTO: 28 %
PHOSPHATE SERPL-MCNC: 4.2 MG/DL (ref 2.5–4.5)
PLATELET # BLD AUTO: 185 10E9/L (ref 150–450)
PLATELET # BLD AUTO: NORMAL 10E9/L (ref 150–450)
PLATELET # BLD EST: ABNORMAL 10*3/UL
POTASSIUM SERPL-SCNC: 4.7 MMOL/L (ref 3.4–5.3)
RBC # BLD AUTO: 3.1 10E12/L (ref 3.8–5.2)
RBC # BLD AUTO: NORMAL 10E12/L (ref 3.8–5.2)
RBC MORPH BLD: NORMAL
SODIUM SERPL-SCNC: 142 MMOL/L (ref 133–144)
TIBC SERPL-MCNC: 224 UG/DL (ref 240–430)
VARIANT LYMPHS BLD QL SMEAR: PRESENT
WBC # BLD AUTO: 5.3 10E9/L (ref 4–11)
WBC # BLD AUTO: NORMAL 10E9/L (ref 4–11)

## 2020-02-25 PROCEDURE — 83735 ASSAY OF MAGNESIUM: CPT | Performed by: INTERNAL MEDICINE

## 2020-02-25 PROCEDURE — 83550 IRON BINDING TEST: CPT | Performed by: INTERNAL MEDICINE

## 2020-02-25 PROCEDURE — 85025 COMPLETE CBC W/AUTO DIFF WBC: CPT | Performed by: INTERNAL MEDICINE

## 2020-02-25 PROCEDURE — 98941 CHIROPRACT MANJ 3-4 REGIONS: CPT | Mod: AT | Performed by: CHIROPRACTOR

## 2020-02-25 PROCEDURE — 83540 ASSAY OF IRON: CPT | Performed by: INTERNAL MEDICINE

## 2020-02-25 PROCEDURE — 80069 RENAL FUNCTION PANEL: CPT | Performed by: INTERNAL MEDICINE

## 2020-02-25 PROCEDURE — 36415 COLL VENOUS BLD VENIPUNCTURE: CPT | Performed by: INTERNAL MEDICINE

## 2020-02-25 PROCEDURE — 82728 ASSAY OF FERRITIN: CPT | Performed by: INTERNAL MEDICINE

## 2020-02-25 NOTE — PROGRESS NOTES
Visit #:  2    Subjective:  Pebbles Powell is a 79 year old female who is seen in f/u up for:        Segmental dysfunction of pelvic region  Low back ache  Segmental dysfunction of lumbar region  Lumbar paraspinal muscle spasm  Spasm of right piriformis muscle  Segmental dysfunction of thoracic region.     Since last visit on 2/19/2020,  Pebbles Powell reports:    Area of chief complaint:  Lumbar :  Symptoms are graded at 3/10. The quality is described as stiff, achey.  Motion has increased, but is still not normal. Patient feels that they are improved due to a reduction in symptoms. Zully reports that she felt better for about 2-3 days after last visit and then her symptoms returned.  Today she is feeling stiff and sore in her lower right lumbar region.  She did have to take some tylenol this morning.      Objective:  The following was observed:    P: palpatory tenderness Piriformis R>>L  A: static palpation demonstrates intersegmental asymmetry , thoracic, lumbar, pelvis  R: motion palpation notes restricted motion, T10, T11 , T12 , L4 , L5  and PSIS Right   T: hypertonicity at: Lumbar erector spine and Piriformis R>>L    Segmental spinal dysfunction/restrictions found at:  T10, T11 , T12 , L4 , L5  and PSIS Right       Assessment:    Diagnoses:      1. Segmental dysfunction of pelvic region    2. Low back ache    3. Segmental dysfunction of lumbar region    4. Lumbar paraspinal muscle spasm    5. Spasm of right piriformis muscle    6. Segmental dysfunction of thoracic region        Patient's condition:  Patient had restrictions pre-manipulation    Treatment effectiveness:  Post manipulation there is better intersegmental movement and Patient claims to feel looser post manipulation      Procedures:  CMT:  75375 Chiropractic manipulative treatment 3-4 regions performed   Thoracic: Activator, T10, T11, T12, Prone  Lumbar: Activator, L4, L5, Prone  Pelvis: Activator, PSIS Right , Prone    Modalities:  97803: MSTM:   To Lumbar erector spine and Piriformis  for 5 min    Therapeutic procedures:  None    Response to Treatment  Reduction in symptoms as reported by patient    Prognosis: Good    Progress towards Goals: Patient is making progress towards the goal.     Recommendations:    Instructions:  ice 20 minutes every other hour as needed    Follow-up:    Return to care in one week.

## 2020-03-16 ENCOUNTER — TELEPHONE (OUTPATIENT)
Dept: FAMILY MEDICINE | Facility: CLINIC | Age: 80
End: 2020-03-16

## 2020-03-16 NOTE — TELEPHONE ENCOUNTER
patient has physical on 3/18/2020 @8:00am. called and left her Vm to return call to cancel appt until July due to Covid 19 if not important to be seen and if appt can wait or offer telephone if needing medication refills or [facility]. Please transfer call to pink team if call is return   Patsy Miranda CMA on 3/16/2020 at 9:27 AM

## 2020-03-18 ENCOUNTER — OFFICE VISIT (OUTPATIENT)
Dept: FAMILY MEDICINE | Facility: CLINIC | Age: 80
End: 2020-03-18
Payer: COMMERCIAL

## 2020-03-18 VITALS
TEMPERATURE: 97.5 F | WEIGHT: 162.8 LBS | OXYGEN SATURATION: 99 % | BODY MASS INDEX: 24.11 KG/M2 | DIASTOLIC BLOOD PRESSURE: 70 MMHG | RESPIRATION RATE: 18 BRPM | HEART RATE: 76 BPM | HEIGHT: 69 IN | SYSTOLIC BLOOD PRESSURE: 120 MMHG

## 2020-03-18 DIAGNOSIS — R15.9 INCONTINENCE OF FECES, UNSPECIFIED FECAL INCONTINENCE TYPE: ICD-10-CM

## 2020-03-18 DIAGNOSIS — Z00.00 MEDICARE ANNUAL WELLNESS VISIT, SUBSEQUENT: Primary | ICD-10-CM

## 2020-03-18 DIAGNOSIS — R19.5 DARK STOOLS: ICD-10-CM

## 2020-03-18 DIAGNOSIS — F32.1 MODERATE MAJOR DEPRESSION (H): ICD-10-CM

## 2020-03-18 DIAGNOSIS — J30.0 VASOMOTOR RHINITIS: ICD-10-CM

## 2020-03-18 DIAGNOSIS — H61.21 IMPACTED CERUMEN OF RIGHT EAR: ICD-10-CM

## 2020-03-18 DIAGNOSIS — I50.32 CHRONIC DIASTOLIC CONGESTIVE HEART FAILURE (H): ICD-10-CM

## 2020-03-18 DIAGNOSIS — N18.4 CKD (CHRONIC KIDNEY DISEASE) STAGE 4, GFR 15-29 ML/MIN (H): ICD-10-CM

## 2020-03-18 DIAGNOSIS — Z85.828 HISTORY OF BASAL CELL CARCINOMA: ICD-10-CM

## 2020-03-18 PROCEDURE — 99397 PER PM REEVAL EST PAT 65+ YR: CPT | Mod: 25 | Performed by: NURSE PRACTITIONER

## 2020-03-18 PROCEDURE — 69209 REMOVE IMPACTED EAR WAX UNI: CPT | Mod: RT | Performed by: NURSE PRACTITIONER

## 2020-03-18 RX ORDER — IPRATROPIUM BROMIDE 42 UG/1
2 SPRAY, METERED NASAL 4 TIMES DAILY
Qty: 15 ML | Refills: 1 | Status: SHIPPED | OUTPATIENT
Start: 2020-03-18 | End: 2022-02-02

## 2020-03-18 ASSESSMENT — MIFFLIN-ST. JEOR: SCORE: 1288.71

## 2020-03-18 ASSESSMENT — ACTIVITIES OF DAILY LIVING (ADL): CURRENT_FUNCTION: NO ASSISTANCE NEEDED

## 2020-03-18 NOTE — LETTER
My Heart Failure Action Plan   Name: Pebbles Powell    YOB: 1940   Date: 3/18/2020    My doctor: No Ref-Primary, Physician     [address]  [place] [address] [phone]  My Diagnosis: Reduced (HFr)- EF:Over 50%   My Exercise Goal: 30 minutes daily  .     My Weight Plan:   Wt Readings from Last 2 Encounters:   03/18/20 73.8 kg (162 lb 12.8 oz)   02/12/20 76.2 kg (168 lb)     Weigh yourself daily using the same scale. If you gain more than 2 pounds in 24 hours or 5 pounds in a week call the clinic    My Diet Goal: No added salt    Emergency Room Visits:    Our goal is to improve your quality of life and help you avoid a visit to the emergency room or hospital.  If we work together, we can achieve this goal. But, if you feel you need to call 911 or go to the emergency room, please do so.  If you go to the emergency room, please bring your list of medicines and your daily weight chart with you.       GREEN ZONE     Doing well today    Weight gained is no more than 2 pounds a day or 5 pounds a week.    No swelling in feet, ankles, legs or stomach.    No more swelling than usual.    No more trouble breathing than usual.    No change in my sleep.    No other problems. Actions:    I am doing fine.  I will take my medicine, follow my diet, see my doctor, exercise, and watch for symptoms.           YELLOW ZONE         Having a bad day or flare up    Weight gain of more than 2 pounds in one day or 5 pounds in one week.    New swelling in ankle, leg, knee or thigh.    Bloating in belly, pants feel tighter.    Swelling in hands or face.    Coughing or trouble breathing while walking or talking.    Harder to breathe last night.    Have trouble sleeping, wake up short of breath.    Much more tired than usual.    Not eating.    Pain in my chest or bad leg cramps.    Feel weak or dizzy. Actions:    I need to take action and call my doctor or nurse today.                  RED ZONE         Need medical care now    Weight gain of 5 pounds overnight.    Chest pain or pressure that does not go away.    Feel less alert.    Wheezing or have trouble breathing when at rest.    Cannot sleep lying down.    Cannot take my water pill.    Pass out or faint. Actions:    I need to call my doctor or nurse now!    Call 911 if I have chest pain or cannot breathe.

## 2020-03-18 NOTE — PROGRESS NOTES
"SUBJECTIVE:   Pebbles Powell is a 79 year old female who presents for Preventive Visit.    Are you in the first 12 months of your Medicare coverage?  No    Healthy Habits:     In general, how would you rate your overall health?  Good    Frequency of exercise:  None    Duration of exercise:  Less than 15 minutes    Do you usually eat at least 4 servings of fruit and vegetables a day, include whole grains    & fiber and avoid regularly eating high fat or \"junk\" foods?  No    Taking medications regularly:  Yes    Barriers to taking medications:  None    Medication side effects:  None    Ability to successfully perform activities of daily living:  No assistance needed    Home Safety:  No safety concerns identified    Hearing Impairment:  No hearing concerns    In the past 6 months, have you been bothered by leaking of urine? Yes    In general, how would you rate your overall mental or emotional health?  Very good      PHQ-2 Total Score: 1    Additional concerns today:  Yes (Runny Nose)    CHF-  Patient denies weight gain, edema, chest pain, shortness of breath.    Chronic kidney disease- patient denies NSAID use.  She continues to follow-up with nephrology.    Depression-  Mood is okay per patient.    Patient will be following up with GI for fecal incontinence.  She notes intermittent constipation and diarrhea.  She is taking metamucil intermittently.  She notes that stools are dark at times.  She does not like the taste of metamucil.    Patient notes clear rhinorrhea and dripping nose.  She feels pressure and \"water\" to her right ear.  Symptoms have been ongoing for some time.    Do you feel safe in your environment? Yes    Have you ever done Advance Care Planning? (For example, a Health Directive, POLST, or a discussion with a medical provider or your loved ones about your wishes): No, advance care planning information given to patient to review.  Patient declined advance care planning discussion at this " time.      Fall risk  Fallen 2 or more times in the past year?: No  Any fall with injury in the past year?: No    Cognitive Screening   1) Repeat 3 items (Leader, Season, Table)    2) Clock draw: ABNORMAL   3) 3 item recall: Recalls 3 objects  Results: 3 items recalled: COGNITIVE IMPAIRMENT LESS LIKELY    Mini-CogTM Copyright CARMELITA Guerrero. Licensed by the author for use in [facility]; reprinted with permission (paras@[facility]). All rights reserved.      Do you have sleep apnea, excessive snoring or daytime drowsiness?: no    Reviewed and updated as needed this visit by clinical staff  Tobacco  Allergies  Meds  Med Hx  Surg Hx  Fam Hx  Soc Hx        Reviewed and updated as needed this visit by Provider        Social History     Tobacco Use     Smoking status: Former Smoker     Packs/day: 1.50     Years: 50.00     Pack years: 75.00     Types: Cigarettes     Last attempt to quit: 2007     Years since quittin.5     Smokeless tobacco: Never Used   Substance Use Topics     Alcohol use: No     Alcohol/week: 0.0 standard drinks     Comment: Alcoholic, treatment in  and          Alcohol Use 3/13/2019   Prescreen: >3 drinks/day or >7 drinks/week? Not Applicable   Prescreen: >3 drinks/day or >7 drinks/week? -           -------------------------------------    Current providers sharing in care for this patient include:   Patient Care Team:  No Ref-Primary, Physician as PCP - General  Harriet Da Silva APRN CNP as Assigned PCP    The following health maintenance items are reviewed in Epic and correct as of today:  Health Maintenance   Topic Date Due     ZOSTER IMMUNIZATION (2 of 3) 2008     MEDICARE ANNUAL WELLNESS VISIT  2019     INFLUENZA VACCINE (1) 2019     HF ACTION PLAN  10/05/2019     LIPID  2020     FALL RISK ASSESSMENT  2020     ADVANCE CARE PLANNING  2020     ALT  2020     PHQ-9  2020     BMP  2020     MICROALBUMIN  10/17/2020      CBC  02/25/2021     DTAP/TDAP/TD IMMUNIZATION (3 - Td) 10/05/2026     DEXA  Completed     TSH W/FREE T4 REFLEX  Completed     DEPRESSION ACTION PLAN  Completed     PNEUMOCOCCAL IMMUNIZATION 65+ LOW/MEDIUM RISK  Completed     IPV IMMUNIZATION  Aged Out     MENINGITIS IMMUNIZATION  Aged Out     Lab work is in process  BP Readings from Last 3 Encounters:   03/18/20 120/70   02/12/20 124/68   11/04/19 123/68    Wt Readings from Last 3 Encounters:   03/18/20 73.8 kg (162 lb 12.8 oz)   02/12/20 76.2 kg (168 lb)   10/28/19 76.2 kg (168 lb)                  Patient Active Problem List   Diagnosis     CHF (Congestive Heart Failure), diastolic dysfunction     CKD (chronic kidney disease) stage 3, GFR 30-59 ml/min (H)     Pernicious anemia     Iron deficiency anemia     BCC (basal cell carcinoma), right lower leg     Vulvar itching     Mammogram declined     Moderate major depression (H)     Advance care planning     Total knee replacement status     Hypothyroidism     History of basal cell carcinoma     Porokeratosis     Anemia secondary to renal failure     CKD (chronic kidney disease) stage 4, GFR 15-29 ml/min (H)     Past Surgical History:   Procedure Laterality Date     APPENDECTOMY       ARTHROPLASTY KNEE Right 7/13/2015    Procedure: ARTHROPLASTY KNEE;  Surgeon: Santino Storm MD;  Location:  OR     ARTHROSCOPY KNEE  11/23/2010    ARTHROSCOPY KNEE performed by BROOKLYNN PULIDO at  OR     BIOPSY       C ANTER VESICOURETHROPEXY,SIMPLE       COLONOSCOPY  6/1/2011    Procedure:COMBINED COLONOSCOPY, SINGLE BIOPSY/POLYPECTOMY BY BIOPSY; Surgeon:JEANNETTE WYNNE; Location: GI     COLPORRHAPHY ANTERIOR       DILATION AND CURETTAGE       ENT SURGERY      T&A     ESOPHAGOSCOPY, GASTROSCOPY, DUODENOSCOPY (EGD), COMBINED  6/1/2011    Procedure:COMBINED ESOPHAGOSCOPY, GASTROSCOPY, DUODENOSCOPY (EGD), BIOPSY SINGLE OR MULTIPLE; MULTIPLE; Surgeon:JEANNETTE WYNNE; Location: GI     EXCISE LESION LOWER  EXTREMITY  2014    Procedure: EXCISE LESION LOWER EXTREMITY;  Surgeon: Santos Roberson MD;  Location: [facility]     HEMORRHOIDECTOMY       HYSTERECTOMY, GABRIEL      fibroids     LAPAROTOMY, TUMOR DEBULKING, COMBINED  2012    Procedure:COMBINED LAPAROTOMY, TUMOR DEBULKING; Exploratory Laparotomy,  Bilateral Salpingo Oophorectomy; Surgeon:MIKIE LU; Location:[facility]       Social History     Tobacco Use     Smoking status: Former Smoker     Packs/day: 1.50     Years: 50.00     Pack years: 75.00     Types: Cigarettes     Last attempt to quit: 2007     Years since quittin.5     Smokeless tobacco: Never Used   Substance Use Topics     Alcohol use: No     Alcohol/week: 0.0 standard drinks     Comment: Alcoholic, treatment in  and      Family History   Problem Relation Age of Onset     Alcohol/Drug Mother      Obesity Mother      Alzheimer Disease Mother      Cancer Father         Pancreatic     Cardiovascular Father         heart disease     Alcohol/Drug Father          Current Outpatient Medications   Medication Sig Dispense Refill     acetaminophen 650 MG TABS Take 650 mg by mouth every 6 hours 100 tablet 1     Cyanocobalamin (VITAMIN B 12 PO) Take 2,500 mcg by mouth       Incontinence Supply Disposable (PREVAIL BLADDER CONTROL PADS) MISC        levothyroxine (SYNTHROID/LEVOTHROID) 112 MCG tablet Take 1 tablet (112 mcg) by mouth daily 90 tablet 3     Magnesium 500 MG CAPS Take 250 mg by mouth daily        MULTIVITAMINS OR TABS 1 TABLET DAILY brand varies per pt       order for DME Equipment being ordered: toeless moderate compression socks 15-20 mmHG 1 each 1     Allergies   Allergen Reactions     No Known Drug Allergies      Mammogram Screening: Patient over age 75, has elected to stop mammography screening.    Review of Systems  Constitutional, HEENT, cardiovascular, pulmonary, GI, , musculoskeletal, neuro, skin, endocrine and psych systems are negative, except as otherwise  "noted.    OBJECTIVE:   /70   Pulse 76   Temp 97.5  F (36.4  C) (Oral)   Resp 18   Ht 1.754 m (5' 9.06\")   Wt 73.8 kg (162 lb 12.8 oz)   SpO2 99%   BMI 24.00 kg/m   Estimated body mass index is 24 kg/m  as calculated from the following:    Height as of this encounter: 1.754 m (5' 9.06\").    Weight as of this encounter: 73.8 kg (162 lb 12.8 oz).  Physical Exam  GENERAL: healthy, alert and no distress  EYES: Eyes grossly normal to inspection, PERRL and conjunctivae and sclerae normal  HENT: normal cephalic/atraumatic, right ear: occluded with wax, left ear: normal: no effusions, no erythema, normal landmarks, nose and mouth without ulcers or lesions, oropharynx clear and oral mucous membranes moist  NECK: no adenopathy, no asymmetry, masses, or scars and thyroid normal to palpation  RESP: lungs clear to auscultation - no rales, rhonchi or wheezes  CV: regular rate and rhythm, normal S1 S2, no S3 or S4, no murmur, click or rub, no peripheral edema and peripheral pulses strong  ABDOMEN: soft, nontender, no hepatosplenomegaly, no masses and bowel sounds normal  MS: no gross musculoskeletal defects noted, no edema  NEURO: Normal strength and tone, mentation intact and speech normal  PSYCH: mentation appears normal, affect normal/bright    Diagnostic Test Results:  In process    ASSESSMENT / PLAN:   1. Medicare annual wellness visit, subsequent      2. Moderate major depression (H)  Stable.  Continue current treatment plan and medications.     3. Chronic diastolic congestive heart failure (H)  Stable.  Continue current treatment plan and medications.   - LDL cholesterol direct    4. CKD (chronic kidney disease) stage 4, GFR 15-29 ml/min (H)  Stable.  Continue current treatment plan and medications.     5. Incontinence of feces, unspecified fecal incontinence type  Patient to try metamucil in pill form.  Follow-up with GI as planned.    6. Dark stools    - Hemoglobin    7. History of basal cell carcinoma    - " "DERMATOLOGY REFERRAL    8. Vasomotor rhinitis    - ipratropium (ATROVENT) 0.06 % nasal spray; Spray 2 sprays into both nostrils 4 times daily  Dispense: 15 mL; Refill: 1    9. Impacted cerumen of right ear  Ear wash performed by staff.      COUNSELING:  Reviewed preventive health counseling, as reflected in patient instructions       Regular exercise       Healthy diet/nutrition       Immunizations    Declined: Influenza due to Conscientious objector            Estimated body mass index is 24 kg/m  as calculated from the following:    Height as of this encounter: 1.754 m (5' 9.06\").    Weight as of this encounter: 73.8 kg (162 lb 12.8 oz).         reports that she quit smoking about 12 years ago. Her smoking use included cigarettes. She has a 75.00 pack-year smoking history. She has never used smokeless tobacco.      Appropriate preventive services were discussed with this patient, including applicable screening as appropriate for cardiovascular disease, diabetes, osteopenia/osteoporosis, and glaucoma.  As appropriate for age/gender, discussed screening for colorectal cancer, prostate cancer, breast cancer, and cervical cancer. Checklist reviewing preventive services available has been given to the patient.    Reviewed patients plan of care and provided an AVS. The Basic Care Plan (routine screening as documented in Health Maintenance) for Pebbles meets the Care Plan requirement. This Care Plan has been established and reviewed with the Patient.    Counseling Resources:  ATP IV Guidelines  Pooled Cohorts Equation Calculator  Breast Cancer Risk Calculator  FRAX Risk Assessment  ICSI Preventive Guidelines  Dietary Guidelines for Americans, 2010  USDA's MyPlate  ASA Prophylaxis  Lung CA Screening    RANDOLPH Toussaint CNP  [facility]    Identified Health Risks:  "

## 2020-04-15 ENCOUNTER — OFFICE VISIT (OUTPATIENT)
Dept: URGENT CARE | Facility: URGENT CARE | Age: 80
End: 2020-04-15
Payer: COMMERCIAL

## 2020-04-15 ENCOUNTER — NURSE TRIAGE (OUTPATIENT)
Dept: FAMILY MEDICINE | Facility: CLINIC | Age: 80
End: 2020-04-15

## 2020-04-15 ENCOUNTER — ANCILLARY PROCEDURE (OUTPATIENT)
Dept: GENERAL RADIOLOGY | Facility: CLINIC | Age: 80
End: 2020-04-15
Attending: PHYSICIAN ASSISTANT
Payer: COMMERCIAL

## 2020-04-15 VITALS
SYSTOLIC BLOOD PRESSURE: 126 MMHG | HEIGHT: 70 IN | HEART RATE: 77 BPM | DIASTOLIC BLOOD PRESSURE: 64 MMHG | TEMPERATURE: 97.4 F | BODY MASS INDEX: 23.48 KG/M2 | OXYGEN SATURATION: 100 % | WEIGHT: 164 LBS

## 2020-04-15 DIAGNOSIS — S69.92XA HAND INJURY, LEFT, INITIAL ENCOUNTER: Primary | ICD-10-CM

## 2020-04-15 DIAGNOSIS — L03.114 CELLULITIS OF LEFT HAND: ICD-10-CM

## 2020-04-15 DIAGNOSIS — S69.92XA HAND INJURY, LEFT, INITIAL ENCOUNTER: ICD-10-CM

## 2020-04-15 DIAGNOSIS — N18.30 CKD (CHRONIC KIDNEY DISEASE) STAGE 3, GFR 30-59 ML/MIN (H): ICD-10-CM

## 2020-04-15 PROCEDURE — 73130 X-RAY EXAM OF HAND: CPT | Mod: LT

## 2020-04-15 PROCEDURE — 99214 OFFICE O/P EST MOD 30 MIN: CPT | Performed by: PHYSICIAN ASSISTANT

## 2020-04-15 RX ORDER — CEPHALEXIN 500 MG/1
500 CAPSULE ORAL 2 TIMES DAILY
Qty: 20 CAPSULE | Refills: 0 | Status: SHIPPED | OUTPATIENT
Start: 2020-04-15 | End: 2020-06-19

## 2020-04-15 ASSESSMENT — MIFFLIN-ST. JEOR: SCORE: 1301.21

## 2020-04-15 NOTE — PATIENT INSTRUCTIONS
Warm soaks in Dreft or Epsom salts 2 times a day for 10 minutes.     Elevate. Ace wrap. Recheck 3 days if not better.

## 2020-04-15 NOTE — TELEPHONE ENCOUNTER
Reason for call:  Patient reporting a symptom    Symptom or request: Swollen on top side of left hand    Duration (how long have symptoms been present): 5 Days    Have you been treated for this before? No    Additional comments: Patient would like to get in clinic have it check and if possible get an x-ray.    Phone Number patient can be reached at:  Home number on file [phone] (home)    Best Time:  any    Can we leave a detailed message on this number:  YES    Call taken on 4/15/2020 at 3:14 PM by Floyd Miranda

## 2020-04-15 NOTE — TELEPHONE ENCOUNTER
"Called and spoke with patient. Reports she bumped her L hand on the corner of the counter 5-6 days ago. Reports the top of her L hand has been swollen, red and warm and seems like it is getting worse. Reports it hurts like a \"son of a gun\" when she tries to use hand or make a fist. Advised patient she should be seen today due to possible cellulitis. Gave UC information. Verbalized understanding & no further questions.    Additional Information    Negative: Similar pain previously and it was from 'heart attack'    Negative: Similar pain previously from 'angina' and not relieved by nitroglycerin    Negative: Sounds like a life-threatening emergency to the triager    Negative: Fever and red area (or area very tender to touch)    Negative: Fever and swollen joint    Negative: Patient sounds very sick or weak to the triager    SEVERE pain (e.g., excruciating, unable to use hand at all)    Red area or streak and large (> 2 in or 5 cm)    Protocols used: HAND AND WRIST PAIN-A-OH    Dulce Maria Franco RN  "

## 2020-04-15 NOTE — PROGRESS NOTES
S:  78 yo female is here for left hand infection.  A few days ago she hit her hand on the countertop.  She did develop a bruise but now it is becoming red, tender and swollen.  She has a mild red streak going up the volar radial forearm.  No fever.        Allergies   Allergen Reactions     No Known Drug Allergies        Past Medical History:   Diagnosis Date     Arthritis      BCC (basal cell carcinoma), right lower leg      CHF (Congestive Heart Failure), diastolic dysfunction      CKD (chronic kidney disease)      Congestive heart failure (H)      History of basal cell carcinoma      PONV (postoperative nausea and vomiting)     usually has N&V with anesthesia     Porokeratosis      Renal disease      Sleep apnea      Unspecified hypothyroidism        acetaminophen 650 MG TABS, Take 650 mg by mouth every 6 hours  Cyanocobalamin (VITAMIN B 12 PO), Take 2,500 mcg by mouth  Incontinence Supply Disposable (PREVAIL BLADDER CONTROL PADS) MISC,   ipratropium (ATROVENT) 0.06 % nasal spray, Spray 2 sprays into both nostrils 4 times daily  levothyroxine (SYNTHROID/LEVOTHROID) 112 MCG tablet, Take 1 tablet (112 mcg) by mouth daily  Magnesium 500 MG CAPS, Take 250 mg by mouth daily   MULTIVITAMINS OR TABS, 1 TABLET DAILY brand varies per pt  order for DME, Equipment being ordered: toeless moderate compression socks 15-20 mmHG    No current facility-administered medications on file prior to visit.       Social History     Tobacco Use     Smoking status: Former Smoker     Packs/day: 1.50     Years: 50.00     Pack years: 75.00     Types: Cigarettes     Last attempt to quit: 2007     Years since quittin.6     Smokeless tobacco: Never Used   Substance Use Topics     Alcohol use: No     Alcohol/week: 0.0 standard drinks     Comment: Alcoholic, treatment in  and      Drug use: No       ROS:  General: negative for fever  SKIN: + as above  neuro- no paresthesias  Psych- nl mentation  resp- no SOB    Physcial  "Exam:  /64   Pulse 77   Temp 97.4  F (36.3  C) (Tympanic)   Ht 1.765 m (5' 9.5\")   Wt 74.4 kg (164 lb)   SpO2 100%   BMI 23.87 kg/m      GENERAL: alert, no acute distress  EYES: conjunctival clear  RESP: Regular breathing rate  NEURO: awake .  SKIN: Left dorsal radial hand is with mild redness,  Swelling, ecchymosis and tenderness over the second and third metatarsal.  Wrist joint is nontender with full range of motion.  Full range of motion of all fingers.  Sensation to soft touch intact.  Good palpable radial pulse.  Faint red streak going up the radial aspect of wrist approximately 1 inch into forearm.    Study Result     HAND LEFT THREE OR MORE VIEWS   4/15/2020 5:25 PM      HISTORY: Left hand infection and pain.     COMPARISON: None.                                                                      IMPRESSION: Dorsal soft tissue swelling. No evidence of acute fracture  or radiopaque foreign body. Advanced first carpometacarpal  degenerative changes. Scattered IP degenerative changes.       ASSESSMENT:    ICD-10-CM    1. Hand injury, left, initial encounter  S69.92XA XR Hand Left G/E 3 Views     cephALEXin (KEFLEX) 500 MG capsule   2. Cellulitis of left hand  L03.114 cephALEXin (KEFLEX) 500 MG capsule   3. CKD (chronic kidney disease) stage 3, GFR 30-59 ml/min (H)  N18.3          PLAN: Cellulitis/lymphangitis from crack in skin when she bumped it.Warm soaks in Dreft or Epsom salts 2 times a day for 10 minutes.   Keflex dose decreased due to kidney disease.  Elevate. Ace wrap. Recheck 3 days if not better.    See today's orders.  Follow-up with primary clinic if not improving.  Advised about symptoms which might herald more serious problems.      Swathi Chan PA-C    "

## 2020-06-16 NOTE — PROGRESS NOTES
Subjective     Pebbles Powell is a 79 year old female who presents to clinic today for the following health issues:    HPI   Chief Complaint   Patient presents with     Anemia     Fatigue   don't want to be on iron pills. Wants to take a booster medication instead for her anemia    Patient has pernicious anemia.  She wonders if it would be okay to take ensure to improve her iron.  She notes that oral iron causes constipation or diarrhea.  She has had injectofer infusions in the past through nephrology.      Patient's follow-up with GI was cancelled for fecal incontinence.  She has been taking metamucil and is has not improved incontinence.  She notes symptoms if she walks a lot.  She notes smearing, not full incontinence of feces.    Patient notes rash to her lower legs that is not improving.  She has applied lotion without much change.  She wonders what she should do.      Patient Active Problem List   Diagnosis     CHF (Congestive Heart Failure), diastolic dysfunction     CKD (chronic kidney disease) stage 3, GFR 30-59 ml/min (H)     Pernicious anemia     Iron deficiency anemia     BCC (basal cell carcinoma), right lower leg     Vulvar itching     Mammogram declined     Moderate major depression (H)     Advance care planning     Total knee replacement status     Hypothyroidism     History of basal cell carcinoma     Porokeratosis     Anemia secondary to renal failure     CKD (chronic kidney disease) stage 4, GFR 15-29 ml/min (H)     Past Surgical History:   Procedure Laterality Date     APPENDECTOMY       ARTHROPLASTY KNEE Right 7/13/2015    Procedure: ARTHROPLASTY KNEE;  Surgeon: Santino Storm MD;  Location:  OR     ARTHROSCOPY KNEE  11/23/2010    ARTHROSCOPY KNEE performed by BROOKLYNN PULIDO at  OR     BIOPSY       C ANTER VESICOURETHROPEXY,SIMPLE       COLONOSCOPY  6/1/2011    Procedure:COMBINED COLONOSCOPY, SINGLE BIOPSY/POLYPECTOMY BY BIOPSY; Surgeon:JEANNETTE WYNNE; Location: GI      COLPORRHAPHY ANTERIOR       DILATION AND CURETTAGE       ENT SURGERY      T&A     ESOPHAGOSCOPY, GASTROSCOPY, DUODENOSCOPY (EGD), COMBINED  2011    Procedure:COMBINED ESOPHAGOSCOPY, GASTROSCOPY, DUODENOSCOPY (EGD), BIOPSY SINGLE OR MULTIPLE; MULTIPLE; Surgeon:JEANNETTE WYNNE; Location:[facility]     EXCISE LESION LOWER EXTREMITY  2014    Procedure: EXCISE LESION LOWER EXTREMITY;  Surgeon: Santos Roberson MD;  Location: [facility]     HEMORRHOIDECTOMY       HYSTERECTOMY, GABRIEL      fibroids     LAPAROTOMY, TUMOR DEBULKING, COMBINED  2012    Procedure:COMBINED LAPAROTOMY, TUMOR DEBULKING; Exploratory Laparotomy,  Bilateral Salpingo Oophorectomy; Surgeon:MIKIE LU; Location[facility]       Social History     Tobacco Use     Smoking status: Former Smoker     Packs/day: 1.50     Years: 50.00     Pack years: 75.00     Types: Cigarettes     Last attempt to quit: 2007     Years since quittin.8     Smokeless tobacco: Never Used   Substance Use Topics     Alcohol use: No     Alcohol/week: 0.0 standard drinks     Comment: Alcoholic, treatment in  and      Family History   Problem Relation Age of Onset     Alcohol/Drug Mother      Obesity Mother      Alzheimer Disease Mother      Cancer Father         Pancreatic     Cardiovascular Father         heart disease     Alcohol/Drug Father          Current Outpatient Medications   Medication Sig Dispense Refill     acetaminophen 650 MG TABS Take 650 mg by mouth every 6 hours 100 tablet 1     Cyanocobalamin (VITAMIN B 12 PO) Take 2,500 mcg by mouth       ferrous gluconate (FERGON) 324 (38 Fe) MG tablet Take 1 tablet (324 mg) by mouth every other day 45 tablet 1     Incontinence Supply Disposable (PREVAIL BLADDER CONTROL PADS) MISC        ipratropium (ATROVENT) 0.06 % nasal spray Spray 2 sprays into both nostrils 4 times daily 15 mL 1     levothyroxine (SYNTHROID/LEVOTHROID) 112 MCG tablet Take 1 tablet (112 mcg) by mouth daily 90 tablet 3     Magnesium  500 MG CAPS Take 250 mg by mouth daily        MULTIVITAMINS OR TABS 1 TABLET DAILY brand varies per pt       order for DME Equipment being ordered: toeless moderate compression socks 15-20 mmHG 1 each 1     triamcinolone (KENALOG) 0.1 % external cream Apply topically 2 times daily for 14 days To rash to legs 45 g 1     Allergies   Allergen Reactions     No Known Drug Allergies      BP Readings from Last 3 Encounters:   06/19/20 112/68   04/15/20 126/64   03/18/20 120/70    Wt Readings from Last 3 Encounters:   06/19/20 76.2 kg (168 lb)   04/15/20 74.4 kg (164 lb)   03/18/20 73.8 kg (162 lb 12.8 oz)                      Reviewed and updated as needed this visit by Provider         Review of Systems   Constitutional, HEENT, cardiovascular, pulmonary, gi and gu systems are negative, except as otherwise noted.      Objective    There were no vitals taken for this visit.  There is no height or weight on file to calculate BMI.  Physical Exam   GENERAL: healthy, alert and no distress  RESP: lungs clear to auscultation - no rales, rhonchi or wheezes  CV: regular rate and rhythm, normal S1 S2, no S3 or S4, no murmur, click or rub, no peripheral edema and peripheral pulses strong  MS: no gross musculoskeletal defects noted, no edema  Skin: macular rash noted to lower legs bilaterally.  Diagnostic Test Results:  none         Assessment & Plan     1. History of basal cell carcinoma  Patient due for follow-up.  - DERMATOLOGY REFERRAL    2. Incontinence of feces, unspecified fecal incontinence type    - GASTROENTEROLOGY ADULT REF CONSULT ONLY; Future    3. Rash  Patient to trial triamcinolone.  If no improvement, follow-up with dermatology.  - DERMATOLOGY REFERRAL  - triamcinolone (KENALOG) 0.1 % external cream; Apply topically 2 times daily for 14 days To rash to legs  Dispense: 45 g; Refill: 1    4. Anemia secondary to renal failure  Patient to try low dose oral iron every other day to see if this is tolerable.  Await lab  results to see if infusion needed at this time.  - Ferritin  - Iron and iron binding capacity  - CBC with platelets and differential  - ferrous gluconate (FERGON) 324 (38 Fe) MG tablet; Take 1 tablet (324 mg) by mouth every other day  Dispense: 45 tablet; Refill: 1    5. Hypothyroidism, unspecified type  Stable.  - TSH with free T4 reflex    6. Chronic diastolic congestive heart failure (H)  Stable.  - LDL cholesterol direct           Return in about 6 months (around 12/19/2020) for Medication Recheck.    RANDOLPH Toussaint CNP  [facility]

## 2020-06-19 ENCOUNTER — OFFICE VISIT (OUTPATIENT)
Dept: FAMILY MEDICINE | Facility: CLINIC | Age: 80
End: 2020-06-19
Payer: COMMERCIAL

## 2020-06-19 VITALS
OXYGEN SATURATION: 98 % | HEART RATE: 89 BPM | TEMPERATURE: 98 F | BODY MASS INDEX: 24.88 KG/M2 | SYSTOLIC BLOOD PRESSURE: 112 MMHG | HEIGHT: 69 IN | DIASTOLIC BLOOD PRESSURE: 68 MMHG | RESPIRATION RATE: 16 BRPM | WEIGHT: 168 LBS

## 2020-06-19 DIAGNOSIS — N18.9 ANEMIA SECONDARY TO RENAL FAILURE: ICD-10-CM

## 2020-06-19 DIAGNOSIS — R15.9 INCONTINENCE OF FECES, UNSPECIFIED FECAL INCONTINENCE TYPE: ICD-10-CM

## 2020-06-19 DIAGNOSIS — D63.1 ANEMIA SECONDARY TO RENAL FAILURE: ICD-10-CM

## 2020-06-19 DIAGNOSIS — I50.32 CHRONIC DIASTOLIC CONGESTIVE HEART FAILURE (H): ICD-10-CM

## 2020-06-19 DIAGNOSIS — Z85.828 HISTORY OF BASAL CELL CARCINOMA: Primary | ICD-10-CM

## 2020-06-19 DIAGNOSIS — E03.9 HYPOTHYROIDISM, UNSPECIFIED TYPE: ICD-10-CM

## 2020-06-19 DIAGNOSIS — R21 RASH: ICD-10-CM

## 2020-06-19 LAB
BASOPHILS # BLD AUTO: 0 10E9/L (ref 0–0.2)
BASOPHILS NFR BLD AUTO: 0 %
DIFFERENTIAL METHOD BLD: ABNORMAL
EOSINOPHIL # BLD AUTO: 0 10E9/L (ref 0–0.7)
EOSINOPHIL NFR BLD AUTO: 0.1 %
ERYTHROCYTE [DISTWIDTH] IN BLOOD BY AUTOMATED COUNT: 12.7 % (ref 10–15)
FERRITIN SERPL-MCNC: 295 NG/ML (ref 8–252)
HCT VFR BLD AUTO: 30.7 % (ref 35–47)
HGB BLD-MCNC: 9.8 G/DL (ref 11.7–15.7)
IRON SATN MFR SERPL: 21 % (ref 15–46)
IRON SERPL-MCNC: 49 UG/DL (ref 35–180)
LDLC SERPL DIRECT ASSAY-MCNC: 98 MG/DL
LYMPHOCYTES # BLD AUTO: 3.5 10E9/L (ref 0.8–5.3)
LYMPHOCYTES NFR BLD AUTO: 40.2 %
MCH RBC QN AUTO: 30.5 PG (ref 26.5–33)
MCHC RBC AUTO-ENTMCNC: 31.9 G/DL (ref 31.5–36.5)
MCV RBC AUTO: 96 FL (ref 78–100)
MONOCYTES # BLD AUTO: 0.9 10E9/L (ref 0–1.3)
MONOCYTES NFR BLD AUTO: 9.8 %
NEUTROPHILS # BLD AUTO: 4.4 10E9/L (ref 1.6–8.3)
NEUTROPHILS NFR BLD AUTO: 49.9 %
PLATELET # BLD AUTO: 226 10E9/L (ref 150–450)
RBC # BLD AUTO: 3.21 10E12/L (ref 3.8–5.2)
TIBC SERPL-MCNC: 230 UG/DL (ref 240–430)
TSH SERPL DL<=0.005 MIU/L-ACNC: 1.29 MU/L (ref 0.4–4)
WBC # BLD AUTO: 8.7 10E9/L (ref 4–11)

## 2020-06-19 PROCEDURE — 82728 ASSAY OF FERRITIN: CPT | Performed by: NURSE PRACTITIONER

## 2020-06-19 PROCEDURE — 83540 ASSAY OF IRON: CPT | Performed by: NURSE PRACTITIONER

## 2020-06-19 PROCEDURE — 85025 COMPLETE CBC W/AUTO DIFF WBC: CPT | Performed by: NURSE PRACTITIONER

## 2020-06-19 PROCEDURE — 83721 ASSAY OF BLOOD LIPOPROTEIN: CPT | Performed by: NURSE PRACTITIONER

## 2020-06-19 PROCEDURE — 84443 ASSAY THYROID STIM HORMONE: CPT | Performed by: NURSE PRACTITIONER

## 2020-06-19 PROCEDURE — 83550 IRON BINDING TEST: CPT | Performed by: NURSE PRACTITIONER

## 2020-06-19 PROCEDURE — 36415 COLL VENOUS BLD VENIPUNCTURE: CPT | Performed by: NURSE PRACTITIONER

## 2020-06-19 PROCEDURE — 99214 OFFICE O/P EST MOD 30 MIN: CPT | Performed by: NURSE PRACTITIONER

## 2020-06-19 RX ORDER — FERROUS GLUCONATE 324(38)MG
324 TABLET ORAL EVERY OTHER DAY
Qty: 45 TABLET | Refills: 1 | Status: SHIPPED | OUTPATIENT
Start: 2020-06-19 | End: 2022-05-13

## 2020-06-19 RX ORDER — TRIAMCINOLONE ACETONIDE 1 MG/G
CREAM TOPICAL 2 TIMES DAILY
Qty: 45 G | Refills: 1 | Status: SHIPPED | OUTPATIENT
Start: 2020-06-19 | End: 2020-07-03

## 2020-06-19 ASSESSMENT — MIFFLIN-ST. JEOR: SCORE: 1312.37

## 2020-06-19 NOTE — RESULT ENCOUNTER NOTE
Dear Pebbles,    Your recent test results are attached.      Stable anemia.    If you have any questions please feel free to contact [phone] or myself via [employer].    Sincerely,  Harriet Da Silva, CNP

## 2020-06-19 NOTE — RESULT ENCOUNTER NOTE
Dear Pebbles,    Your recent test results are attached.      Normal thyroid.  Good cholesterol.  Iron stores are okay.  Please try the oral iron as discussed in clinic.    If you have any questions please feel free to contact [phone] or myself via [employer].    Sincerely,  Harriet Da Silva, CNP

## 2020-06-30 ENCOUNTER — OFFICE VISIT (OUTPATIENT)
Dept: DERMATOLOGY | Facility: CLINIC | Age: 80
End: 2020-06-30
Payer: COMMERCIAL

## 2020-06-30 DIAGNOSIS — D48.9 NEOPLASM OF UNCERTAIN BEHAVIOR: Primary | ICD-10-CM

## 2020-06-30 DIAGNOSIS — L57.0 ACTINIC KERATOSIS: ICD-10-CM

## 2020-06-30 DIAGNOSIS — Z85.828 HISTORY OF NONMELANOMA SKIN CANCER: ICD-10-CM

## 2020-06-30 ASSESSMENT — PAIN SCALES - GENERAL: PAINLEVEL: NO PAIN (0)

## 2020-06-30 NOTE — NURSING NOTE
Lidocaine-epinephrine 1-1:558257 % injection   1.5mL once for one use, starting 6/30/2020 ending 6/30/2020,  2mL disp, R-0, injection  Injected by Leatha Flores LPN

## 2020-06-30 NOTE — LETTER
"6/30/2020       RE: Pebbles Powell  [address]  [address]  [place] [address]     Dear Colleague,    Thank you for referring your patient, Pebbles Powell, to the [facility] DERMATOLOGY at [facility]. Please see a copy of my visit note below.    Formerly [facility] Dermatology Note      Dermatology Problem List:  1. NMSC:  - BCC, R jesus, 2014.  2. AKs. Cryo.  3. Suspected DSAP, bx pending as below.  # NUBs:  - Left elbow, s/p shave bx 6/30/2020  - R shin superior, s/p shave bx 6/30/2020  - R shin inferior, s/p shave bx 6/30/2020    CC:   Chief Complaint   Patient presents with     Derm Problem     Zully is here today for a skin check. She states \" I have multiple areas that concern me today\"         Encounter Date: Jun 30, 2020    History of Present Illness:  Ms. Pebbles Powell is a 79 year old female who presents for evaluation of skin check and lesions of concern. She notes many scaly spots on her legs. Most of these are symptomatic but one on her right lower leg is a little bigger and different than the others. She's had many spots frozen in the past. Has a history of BCC on right shin treated in 2014. Dad with pancreatic cancer but no family history of skin cancer. No painful, bleeding, non-healing, or otherwise symptomatic lesions. Otherwise in usual state of health. No additional skin concerns.    Past Medical History:   Patient Active Problem List   Diagnosis     CHF (Congestive Heart Failure), diastolic dysfunction     CKD (chronic kidney disease) stage 3, GFR 30-59 ml/min (H)     Pernicious anemia     Iron deficiency anemia     BCC (basal cell carcinoma), right lower leg     Vulvar itching     Mammogram declined     Moderate major depression (H)     Advance care planning     Total knee replacement status     Hypothyroidism     History of basal cell carcinoma     Porokeratosis     Anemia secondary to renal failure     CKD (chronic kidney disease) " stage 4, GFR 15-29 ml/min (H)     Past Medical History:   Diagnosis Date     Arthritis      BCC (basal cell carcinoma), right lower leg      CHF (Congestive Heart Failure), diastolic dysfunction      CKD (chronic kidney disease)      Congestive heart failure (H)      History of basal cell carcinoma      PONV (postoperative nausea and vomiting)     usually has N&V with anesthesia     Porokeratosis      Renal disease      Sleep apnea      Unspecified hypothyroidism      Past Surgical History:   Procedure Laterality Date     APPENDECTOMY       ARTHROPLASTY KNEE Right 7/13/2015    Procedure: ARTHROPLASTY KNEE;  Surgeon: Santino Storm MD;  [facility]: [facility]     ARTHROSCOPY KNEE  11/23/2010    ARTHROSCOPY KNEE performed by BROOKLYNN PULIDO at [facility]     BIOPSY       C ANTER VESICOURETHROPEXY,SIMPLE       COLONOSCOPY  6/1/2011    Procedure:COMBINED COLONOSCOPY, SINGLE BIOPSY/POLYPECTOMY BY BIOPSY; Surgeon:JEANNETTE WYNNE; Location: [facility]     COLPORRHAPHY ANTERIOR       DILATION AND CURETTAGE       ENT SURGERY      T&A     ESOPHAGOSCOPY, GASTROSCOPY, DUODENOSCOPY (EGD), COMBINED  6/1/2011    Procedure:COMBINED ESOPHAGOSCOPY, GASTROSCOPY, DUODENOSCOPY (EGD), BIOPSY SINGLE OR MULTIPLE; MULTIPLE; Surgeon:JEANNETTE WYNNE; Location: GI     EXCISE LESION LOWER EXTREMITY  7/18/2014    Procedure: EXCISE LESION LOWER EXTREMITY;  Surgeon: Santos Roberson MD;  Location: [facility]     HEMORRHOIDECTOMY       HYSTERECTOMY, GABRIEL      fibroids     LAPAROTOMY, TUMOR DEBULKING, COMBINED  2/9/2012    Procedure:COMBINED LAPAROTOMY, TUMOR DEBULKING; Exploratory Laparotomy,  Bilateral Salpingo Oophorectomy; Surgeon:MIKIE LU; [facility]       Social History:  Patient reports that she quit smoking about 12 years ago. Her smoking use included cigarettes. She has a 75.00 pack-year smoking history. She has never used smokeless tobacco. She reports that she does not drink alcohol or use drugs.    Family History:  Family  History   Problem Relation Age of Onset     Alcohol/Drug Mother      Obesity Mother      Alzheimer Disease Mother      Cancer Father         Pancreatic     Cardiovascular Father         heart disease     Alcohol/Drug Father        Medications:  Current Outpatient Medications   Medication Sig Dispense Refill     acetaminophen 650 MG TABS Take 650 mg by mouth every 6 hours 100 tablet 1     Cyanocobalamin (VITAMIN B 12 PO) Take 2,500 mcg by mouth       ferrous gluconate (FERGON) 324 (38 Fe) MG tablet Take 1 tablet (324 mg) by mouth every other day 45 tablet 1     Incontinence Supply Disposable (PREVAIL BLADDER CONTROL PADS) MISC        ipratropium (ATROVENT) 0.06 % nasal spray Spray 2 sprays into both nostrils 4 times daily 15 mL 1     levothyroxine (SYNTHROID/LEVOTHROID) 112 MCG tablet Take 1 tablet (112 mcg) by mouth daily 90 tablet 3     Magnesium 500 MG CAPS Take 250 mg by mouth daily        MULTIVITAMINS OR TABS 1 TABLET DAILY brand varies per pt       order for DME Equipment being ordered: toeless moderate compression socks 15-20 mmHG 1 each 1     triamcinolone (KENALOG) 0.1 % external cream Apply topically 2 times daily for 14 days To rash to legs 45 g 1     COMPOUNDED NON-CONTROLLED SUBSTANCE (CMPD RX) - PHARMACY TO MIX COMPOUNDED MEDICATION 2% cholesterol/2% lovastatin ointment - apply twice daily to arms and legs 454 g 3     Allergies   Allergen Reactions     No Known Drug Allergies          Review of Systems:  -Constitutional: Otherwise feeling well today, in usual state of health.  -Skin: As above in HPI. No additional skin concerns.    Physical exam:  GEN: This is a well developed, well-nourished female in no acute distress, in a pleasant mood.    SKIN: Total skin excluding the undergarment areas was performed. The exam included the head/face, neck, both arms, chest, back, abdomen, both legs, digits and/or nails.   -Otto skin type: II  -Several gritty pink papules on forearms.  -Forearms and lower  legs with many scaly flesh-colored thin papules with collarette of scale.  -Right shin inferior with thicker excoriated pink plaque.  -Left elbow with ~8 mm light pink thin papule. Dermoscopy with dotted vessels.  - Well-healed scar R shin.  -No other lesions of concern on areas examined.     Impression/Plan:  1. NUBs:  - Left elbow, s/p shave bx 6/30/2020 - ddx BLK v SCC v BCC  - R shin superior, s/p shave bx 6/30/2020 - ddx porokeratosis, suspect patient may have DSAP  - R shin inferior, s/p shave bx 6/30/2020 - ddx BCC v SCC  - Shave biopsy (x3):  After discussion of benefits and risks including but not limited to bleeding/bruising, pain/swelling, infection, scar, incomplete removal, nerve damage/numbness, recurrence, and non-diagnostic biopsy, written consent, verbal consent and photographs were obtained. Time-out was performed. The area was cleaned with isopropyl alcohol. 0.5ml of 1% lidocaine with 1:100,000 epinephrine was injected to obtain adequate anesthesia. A shave biopsy was performed. Hemostasis was achieved with aluminium chloride. Vaseline and a sterile dressing were applied. The patient tolerated the procedure and no complications were noted. The patient was provided with verbal and written post care instructions.  - If bx confirm porokeratosis, will try 2% cholesterol/2% lovastatin compounded cream recently reported for use in DSAP (J Am Acad Dermatol. 2020 Jan;82(1):123-131.)    2. Actinic keratoses.  - Cryotherapy procedure note: After verbal consent and discussion of risks and benefits including but no limited to dyspigmentation/scar, blister, and pain, 8 was(were) treated with 1-2mm freeze border for 2 cycles with liquid nitrogen. Post cryotherapy instructions were provided.    3. History of NMSC. No evidence of recurrent disease.  - Continue photoprotection  - Continue yearly skin exams  - Advised to monitor for changing, non-healing, bleeding, painful, changing, or otherwise symptomatic  lesions    CC Harriet Da Silva, APRN CNP  [address] [place] [address] on close of this encounter.    Follow-up in 1 year, earlier for new or changing lesions.       Staff Involved:  Staff Only    Yoana Valentine MD    Department of Dermatology  [facility]: Phone: [phone], Fax: [phone]  7/3/2020    Again, thank you for allowing me to participate in the care of your patient.      Sincerely,    Yoana Valentine MD

## 2020-06-30 NOTE — NURSING NOTE
"Chief Complaint   Patient presents with     Derm Problem     Zully is here today for a skin check. She states \" I have multiple areas that concern me today\"     Chiara Stoddard, GUILLAUME  "

## 2020-06-30 NOTE — PATIENT INSTRUCTIONS
Wound Care After a Biopsy    What is a skin biopsy?  A skin biopsy allows the doctor to examine a very small piece of tissue under the microscope to determine the diagnosis and the best treatment for the skin condition. A local anesthetic (numbing medicine)  is injected with a very small needle into the skin area to be tested. A small piece of skin is taken from the area. Sometimes a suture (stitch) is used.     What are the risks of a skin biopsy?  I will experience scar, bleeding, swelling, pain, crusting and redness. I may experience incomplete removal or recurrence. Risks of this procedure are excessive bleeding, bruising, infection, nerve damage, numbness, thick (hypertrophic or keloidal) scar and non-diagnostic biopsy.    How should I care for my wound for the first 24 hours?    Keep the wound dry and covered for 24 hours    If it bleeds, hold direct pressure on the area for 15 minutes. If bleeding does not stop then go to the emergency room    Avoid strenuous exercise the first 1-2 days or as your doctor instructs you    How should I care for the wound after 24 hours?    After 24 hours, remove the bandage    You may bathe or shower as normal    If you had a scalp biopsy, you can shampoo as usual and can use shower water to clean the biopsy site daily    Clean the wound twice a day with gentle soap and water    Do not scrub, be gentle    Apply white petroleum/Vaseline after cleaning the wound with a cotton swab or a clean finger, and keep the site covered with a Bandaid /bandage. Bandages are not necessary with a scalp biopsy    If you are unable to cover the site with a Bandaid /bandage, re-apply ointment 2-3 times a day to keep the site moist. Moisture will help with healing    Avoid strenuous activity for first 1-2 days    Avoid lakes, rivers, pools, and oceans until the stitches are removed or the site is healed    How do I clean my wound?    Wash hands thoroughly with soap or use hand  before all  wound care    Clean the wound with gentle soap and water    Apply white petroleum/Vaseline  to wound after it is clean    Replace the Bandaid /bandage to keep the wound covered for the first few days or as instructed by your doctor    If you had a scalp biopsy, warm shower water to the area on a daily basis should suffice    What should I use to clean my wound?     Cotton-tipped applicators (Qtips )    White petroleum jelly (Vaseline ). Use a clean new container and use Q-tips to apply.    Bandaids   as needed    Gentle soap     How should I care for my wound long term?    Do not get your wound dirty    Keep up with wound care for one week or until the area is healed.    A small scab will form and fall off by itself when the area is completely healed. The area will be red and will become pink in color as it heals. Sun protection is very important for how your scar will turn out. Sunscreen with an SPF 30 or greater is recommended once the area is healed.    If you have stitches, stitches need to be removed in 14 days. You may return to our clinic for this or you may have it done locally at your doctor s office.    You should have some soreness but it should be mild and slowly go away over several days. Talk to your doctor about using tylenol for pain,    When should I call my doctor?  If you have increased:     Pain or swelling    Pus or drainage (clear or slightly yellow drainage is ok)    Temperature over 100F    Spreading redness or warmth around wound    When will I hear about my results?  The biopsy results can take 2-3 weeks to come back. The clinic will call you with the results, send you a [facility] message, or have you schedule a follow-up clinic or phone time to discuss the results. Contact our clinics if you do not hear from us in 3 weeks.     Who should I call with questions?    [facility]: [phone]     [facility]: [phone]    For  urgent needs outside of business hours call the [facility] at [phone] and ask for the dermatology resident on call  Cryotherapy    What is it?    Use of a very cold liquid, such as liquid nitrogen, to freeze and destroy abnormal skin cells that need to be removed    What should I expect?    Tenderness and redness    A small blister that might grow and fill with dark purple blood. There may be crusting.    More than one treatment may be needed if the lesions do not go away.    How do I care for the treated area?    Gently wash the area with your hands when bathing.    Use a thin layer of Vaseline to help with healing. You may use a Band-Aid.     The area should heal within 7-10 days and may leave behind a pink or lighter color.     Do not use an antibiotic or Neosporin ointment.     You may take acetaminophen (Tylenol) for pain.     Call your Doctor if you have:    Severe pain    Signs of infection (warmth, redness, cloudy yellow drainage, and or a bad smell)    Questions or concerns    Who should I call with questions?       [facility]: [phone]       [facility]: [phone]       For urgent needs outside of business hours call the [facility] at [phone]        and ask for the dermatology resident on call

## 2020-07-02 DIAGNOSIS — L56.5 DSAP (DISSEMINATED SUPERFICIAL ACTINIC POROKERATOSIS): Primary | ICD-10-CM

## 2020-07-02 LAB — COPATH REPORT: NORMAL

## 2020-07-03 NOTE — PROGRESS NOTES
"[facility] Dermatology Note      Dermatology Problem List:  1. NMSC:  - BCC, R jesus, 2014.  2. AKs. Cryo.  3. Suspected DSAP, bx pending as below.  # NUBs:  - Left elbow, s/p shave bx 6/30/2020  - R shin superior, s/p shave bx 6/30/2020  - R shin inferior, s/p shave bx 6/30/2020    CC:   Chief Complaint   Patient presents with     Derm Problem     Zully is here today for a skin check. She states \" I have multiple areas that concern me today\"         Encounter Date: Jun 30, 2020    History of Present Illness:  Ms. Pebbles Powell is a 79 year old female who presents for evaluation of skin check and lesions of concern. She notes many scaly spots on her legs. Most of these are symptomatic but one on her right lower leg is a little bigger and different than the others. She's had many spots frozen in the past. Has a history of BCC on right shin treated in 2014. Dad with pancreatic cancer but no family history of skin cancer. No painful, bleeding, non-healing, or otherwise symptomatic lesions. Otherwise in usual state of health. No additional skin concerns.    Past Medical History:   Patient Active Problem List   Diagnosis     CHF (Congestive Heart Failure), diastolic dysfunction     CKD (chronic kidney disease) stage 3, GFR 30-59 ml/min (H)     Pernicious anemia     Iron deficiency anemia     BCC (basal cell carcinoma), right lower leg     Vulvar itching     Mammogram declined     Moderate major depression (H)     Advance care planning     Total knee replacement status     Hypothyroidism     History of basal cell carcinoma     Porokeratosis     Anemia secondary to renal failure     CKD (chronic kidney disease) stage 4, GFR 15-29 ml/min (H)     Past Medical History:   Diagnosis Date     Arthritis      BCC (basal cell carcinoma), right lower leg      CHF (Congestive Heart Failure), diastolic dysfunction      CKD (chronic kidney disease)      Congestive heart failure (H)      History of basal cell " carcinoma      PONV (postoperative nausea and vomiting)     usually has N&V with anesthesia     Porokeratosis      Renal disease      Sleep apnea      Unspecified hypothyroidism      Past Surgical History:   Procedure Laterality Date     APPENDECTOMY       ARTHROPLASTY KNEE Right 7/13/2015    Procedure: ARTHROPLASTY KNEE;  Surgeon: Santino Storm MD;  [facility]     ARTHROSCOPY KNEE  11/23/2010    ARTHROSCOPY KNEE performed by BROOKLNYN PULIDO at [facility]     BIOPSY       C ANTER VESICOURETHROPEXY,SIMPLE       COLONOSCOPY  6/1/2011    Procedure:COMBINED COLONOSCOPY, SINGLE BIOPSY/POLYPECTOMY BY BIOPSY; Surgeon:JEANNETTE WYNNE; Location: [facility]     COLPORRHAPHY ANTERIOR       DILATION AND CURETTAGE       ENT SURGERY      T&A     ESOPHAGOSCOPY, GASTROSCOPY, DUODENOSCOPY (EGD), COMBINED  6/1/2011    Procedure:COMBINED ESOPHAGOSCOPY, GASTROSCOPY, DUODENOSCOPY (EGD), BIOPSY SINGLE OR MULTIPLE; MULTIPLE; Surgeon:JEANNETTE WYNNE; Location: GI     EXCISE LESION LOWER EXTREMITY  7/18/2014    Procedure: EXCISE LESION LOWER EXTREMITY;  Surgeon: Santos Roberson MD;  Location: [facility]     HEMORRHOIDECTOMY       HYSTERECTOMY, GABRIEL      fibroids     LAPAROTOMY, TUMOR DEBULKING, COMBINED  2/9/2012    Procedure:COMBINED LAPAROTOMY, TUMOR DEBULKING; Exploratory Laparotomy,  Bilateral Salpingo Oophorectomy; Surgeon:MIKIE LU; [facility]       Social History:  Patient reports that she quit smoking about 12 years ago. Her smoking use included cigarettes. She has a 75.00 pack-year smoking history. She has never used smokeless tobacco. She reports that she does not drink alcohol or use drugs.    Family History:  Family History   Problem Relation Age of Onset     Alcohol/Drug Mother      Obesity Mother      Alzheimer Disease Mother      Cancer Father         Pancreatic     Cardiovascular Father         heart disease     Alcohol/Drug Father        Medications:  Current Outpatient Medications   Medication Sig  Dispense Refill     acetaminophen 650 MG TABS Take 650 mg by mouth every 6 hours 100 tablet 1     Cyanocobalamin (VITAMIN B 12 PO) Take 2,500 mcg by mouth       ferrous gluconate (FERGON) 324 (38 Fe) MG tablet Take 1 tablet (324 mg) by mouth every other day 45 tablet 1     Incontinence Supply Disposable (PREVAIL BLADDER CONTROL PADS) MISC        ipratropium (ATROVENT) 0.06 % nasal spray Spray 2 sprays into both nostrils 4 times daily 15 mL 1     levothyroxine (SYNTHROID/LEVOTHROID) 112 MCG tablet Take 1 tablet (112 mcg) by mouth daily 90 tablet 3     Magnesium 500 MG CAPS Take 250 mg by mouth daily        MULTIVITAMINS OR TABS 1 TABLET DAILY brand varies per pt       order for DME Equipment being ordered: toeless moderate compression socks 15-20 mmHG 1 each 1     triamcinolone (KENALOG) 0.1 % external cream Apply topically 2 times daily for 14 days To rash to legs 45 g 1     COMPOUNDED NON-CONTROLLED SUBSTANCE (CMPD RX) - PHARMACY TO MIX COMPOUNDED MEDICATION 2% cholesterol/2% lovastatin ointment - apply twice daily to arms and legs 454 g 3     Allergies   Allergen Reactions     No Known Drug Allergies          Review of Systems:  -Constitutional: Otherwise feeling well today, in usual state of health.  -Skin: As above in HPI. No additional skin concerns.    Physical exam:  GEN: This is a well developed, well-nourished female in no acute distress, in a pleasant mood.    SKIN: Total skin excluding the undergarment areas was performed. The exam included the head/face, neck, both arms, chest, back, abdomen, both legs, digits and/or nails.   -Otto skin type: II  -Several gritty pink papules on forearms.  -Forearms and lower legs with many scaly flesh-colored thin papules with collarette of scale.  -Right shin inferior with thicker excoriated pink plaque.  -Left elbow with ~8 mm light pink thin papule. Dermoscopy with dotted vessels.  - Well-healed scar R shin.  -No other lesions of concern on areas examined.      Impression/Plan:  1. NUBs:  - Left elbow, s/p shave bx 6/30/2020 - ddx BLK v SCC v BCC  - R shin superior, s/p shave bx 6/30/2020 - ddx porokeratosis, suspect patient may have DSAP  - R shin inferior, s/p shave bx 6/30/2020 - ddx BCC v SCC  - Shave biopsy (x3):  After discussion of benefits and risks including but not limited to bleeding/bruising, pain/swelling, infection, scar, incomplete removal, nerve damage/numbness, recurrence, and non-diagnostic biopsy, written consent, verbal consent and photographs were obtained. Time-out was performed. The area was cleaned with isopropyl alcohol. 0.5ml of 1% lidocaine with 1:100,000 epinephrine was injected to obtain adequate anesthesia. A shave biopsy was performed. Hemostasis was achieved with aluminium chloride. Vaseline and a sterile dressing were applied. The patient tolerated the procedure and no complications were noted. The patient was provided with verbal and written post care instructions.  - If bx confirm porokeratosis, will try 2% cholesterol/2% lovastatin compounded cream recently reported for use in DSAP (J Am Acad Dermatol. 2020 Jan;82(1):123-131.)    2. Actinic keratoses.  - Cryotherapy procedure note: After verbal consent and discussion of risks and benefits including but no limited to dyspigmentation/scar, blister, and pain, 8 was(were) treated with 1-2mm freeze border for 2 cycles with liquid nitrogen. Post cryotherapy instructions were provided.    3. History of NMSC. No evidence of recurrent disease.  - Continue photoprotection  - Continue yearly skin exams  - Advised to monitor for changing, non-healing, bleeding, painful, changing, or otherwise symptomatic lesions    NACHO Da Silva, RANDOLPH CNP  [address] [place] [address] on close of this encounter.    Follow-up in 1 year, earlier for new or changing lesions.       Staff Involved:  Staff Only    Yoana Valentine MD    Department of  Dermatology  [facility]: Phone: [phone], Fax: [phone]  7/3/2020

## 2020-07-25 ENCOUNTER — MYC REFILL (OUTPATIENT)
Dept: DERMATOLOGY | Facility: CLINIC | Age: 80
End: 2020-07-25

## 2020-07-25 DIAGNOSIS — L56.5 DSAP (DISSEMINATED SUPERFICIAL ACTINIC POROKERATOSIS): ICD-10-CM

## 2020-07-29 ENCOUNTER — TELEPHONE (OUTPATIENT)
Dept: DERMATOLOGY | Facility: CLINIC | Age: 80
End: 2020-07-29

## 2020-07-29 NOTE — LETTER
[facility]  Dermatology  [address] [address]  Phone: [phone]  Fax: [phone]       Name: Pebbles Powell   : 1940   MRN: [id number]       To whom it may concern:    I am contacting you as a dermatologist caring for Pebbles Powell with regard to the patient's diagnosis of disseminated superficial actinic porokeratoses (DSAP).    I recently prescribed this patient a compounded prescription for Cholesterol 2% Lovastatin 2% ointment, which required a prior authorization. The prior authorization was denied and the patient was unable to fill their prescription. I have reviewed the patient's diagnosis, care plan and clinical guidelines for treatment and request a formal appeal of your denial for Cholesterol 2% Lovastatin 2% ointment.    When treating a patient with disseminated superficial actinic porokeratoses (DSAP), it is necessary to have access to the full spectrum of accepted treatments as patients may not be able to use one particular treatment due to lack of response, the potential for side effects or even an allergic reaction. It can become a serious safety issue for the patient if I am not able to prescribe a wide variety of treatments for this condition.    I strongly believe Pebbles needs access to cholesterol 2%/lovastatin 2% ointment. There are no FDA-approved treatments for this condition and given its rarity, we need to rely on case reports and small case series. Promisingly, cholesterol 2%/lovastatin 2% ointment is one of the first medications identified that specifically targets the underlying pathogenesis of porokeratosis (see reference below).    Ms. Powell has previously failed cryotherapy and topical chemotherapy with 5-fluorouracil.  As porokeratoses are considered a premalignant condition with a malignant transformation rate of 7.5%, we need to give Ms. Powell an option to help her treat this.     Additionally, I  "request that you review the following evidence showing how this medication can be effectively utilized to treat disseminated superficial actinic porokeratoses (DSAP).    1. Olive JUARES, Roderick YH, Brad JENSEN. \"Topical cholesterol/lovastatin for the treatment of porokeratosis: A pathogenesis-directed therapy.\" J Am Acad Dermatol. 2020 Jan;82(1):123-131.     On behalf of Pebbels Powell,  I would appreciate your prompt reconsideration of this denial. Please feel free to contact me at information below for any additional information you may require. I look forward to receiving your response and approval of coverage for this medication.    Sincerely,        Yoana Valentine MD    Department of Dermatology  [facility]: Phone: [phone], Fax: [phone]  8/21/2020    "

## 2020-07-29 NOTE — TELEPHONE ENCOUNTER
A prior authorization is needed for the following medication prescribed.  Please complete a prior authorization with the information included below.    Medication: FV-Cholesterol 2% Lovastatin 2% (WP) Oint   Ingredients: Cholesterol Powd NDC: [address]-41 Qty: 9.080gm  Lovastatin 40mg Tabs NDC: [address]-58 Qty: 227 tabs  [employer] Gel  NDC: [address]-77 Qty: 217.920gm  RX #: [id number]  Reason for Rejection: Not covered    Pharmacy Insurance plan: [facility] [place] Part [place]  BIN #:[id number]  ID #: [id number]  PCN #: [id number]  Phone #: [phone]      Pharmacy NPI:[id number]      Please advise the pharmacy when the prior authorization is approved or denied.     Thank you for your time.    [facility]  [phone]

## 2020-07-30 NOTE — TELEPHONE ENCOUNTER
PA Initiation    Medication: FV-Cholesterol 2% Lovastatin 2% (WP) Oint  Insurance Company: [employer] [place] - Phone [phone] Fax [phone]  Pharmacy Filling the Rx: [facility] - [place], [place] - [facility] [address]  Filling Pharmacy Phone: [phone]  Filling Pharmacy Fax:    Start Date: 7/30/2020    Central Prior Authorization Team   Phone: [phone]

## 2020-08-04 NOTE — TELEPHONE ENCOUNTER
Prior Authorization Follow Up    Called [facility] - Phone [phone] Fax [phone] to check status of FV-Cholesterol 2% Lovastatin 2% (WP) Oint. Request is in review. Insurance will have a determination by 08/09/2020.

## 2020-08-13 NOTE — TELEPHONE ENCOUNTER
Prior Authorization Follow Up    Called [facility] - Phone [phone] to check status of FV-Cholesterol 2% Lovastatin 2% (WP) Oint. But automated message stated that reps were not available. Called back again and was transferred to the same message, will try again later. leslie also fax request asking for call/status.

## 2020-08-18 NOTE — TELEPHONE ENCOUNTER
Prior Authorization Denied    Medication: FV-Cholesterol 2% Lovastatin 2% (WP) Oint    It appears the medication requested is excluded from the patients plan. Patient may receive medication, but would have to pay out of pocket.  Please close encounter when finished.      Central Prior Authorization Team  [phone]

## 2020-08-21 ENCOUNTER — OFFICE VISIT (OUTPATIENT)
Dept: URGENT CARE | Facility: URGENT CARE | Age: 80
End: 2020-08-21
Payer: COMMERCIAL

## 2020-08-21 VITALS
HEART RATE: 97 BPM | TEMPERATURE: 99 F | SYSTOLIC BLOOD PRESSURE: 104 MMHG | WEIGHT: 161 LBS | DIASTOLIC BLOOD PRESSURE: 57 MMHG | RESPIRATION RATE: 18 BRPM | BODY MASS INDEX: 23.73 KG/M2 | OXYGEN SATURATION: 96 %

## 2020-08-21 DIAGNOSIS — R19.7 DIARRHEA, UNSPECIFIED TYPE: Primary | ICD-10-CM

## 2020-08-21 DIAGNOSIS — D72.829 LEUKOCYTOSIS, UNSPECIFIED TYPE: ICD-10-CM

## 2020-08-21 LAB
BASOPHILS # BLD AUTO: 0 10E9/L (ref 0–0.2)
BASOPHILS NFR BLD AUTO: 0 %
DIFFERENTIAL METHOD BLD: ABNORMAL
EOSINOPHIL # BLD AUTO: 0 10E9/L (ref 0–0.7)
EOSINOPHIL NFR BLD AUTO: 0 %
ERYTHROCYTE [DISTWIDTH] IN BLOOD BY AUTOMATED COUNT: 13.1 % (ref 10–15)
HCT VFR BLD AUTO: 31.2 % (ref 35–47)
HGB BLD-MCNC: 9.9 G/DL (ref 11.7–15.7)
LYMPHOCYTES # BLD AUTO: 2.7 10E9/L (ref 0.8–5.3)
LYMPHOCYTES NFR BLD AUTO: 16.5 %
MCH RBC QN AUTO: 30.1 PG (ref 26.5–33)
MCHC RBC AUTO-ENTMCNC: 31.7 G/DL (ref 31.5–36.5)
MCV RBC AUTO: 95 FL (ref 78–100)
MONOCYTES # BLD AUTO: 1.4 10E9/L (ref 0–1.3)
MONOCYTES NFR BLD AUTO: 8.4 %
NEUTROPHILS # BLD AUTO: 12.3 10E9/L (ref 1.6–8.3)
NEUTROPHILS NFR BLD AUTO: 75.1 %
PLATELET # BLD AUTO: 198 10E9/L (ref 150–450)
RBC # BLD AUTO: 3.29 10E12/L (ref 3.8–5.2)
WBC # BLD AUTO: 16.4 10E9/L (ref 4–11)

## 2020-08-21 PROCEDURE — 99214 OFFICE O/P EST MOD 30 MIN: CPT | Performed by: PHYSICIAN ASSISTANT

## 2020-08-21 PROCEDURE — 36415 COLL VENOUS BLD VENIPUNCTURE: CPT | Performed by: PHYSICIAN ASSISTANT

## 2020-08-21 PROCEDURE — 85025 COMPLETE CBC W/AUTO DIFF WBC: CPT | Performed by: PHYSICIAN ASSISTANT

## 2020-08-21 RX ORDER — LOPERAMIDE HYDROCHLORIDE 2 MG/1
2 TABLET ORAL 4 TIMES DAILY PRN
Qty: 30 TABLET | Refills: 0 | Status: SHIPPED | OUTPATIENT
Start: 2020-08-21 | End: 2021-02-05

## 2020-08-21 ASSESSMENT — ENCOUNTER SYMPTOMS
CARDIOVASCULAR NEGATIVE: 1
PALPITATIONS: 0
RESPIRATORY NEGATIVE: 1
FEVER: 0
SHORTNESS OF BREATH: 0
CONSTIPATION: 0
CHEST TIGHTNESS: 0
FREQUENCY: 0
WHEEZING: 0
COUGH: 0
NAUSEA: 1
VOMITING: 0
HEARTBURN: 0
FATIGUE: 1
HEMATOCHEZIA: 0
CHILLS: 1
ABDOMINAL PAIN: 0
HEADACHES: 1
DIARRHEA: 1
DYSURIA: 0
HEMATURIA: 0
FLANK PAIN: 0

## 2020-08-21 NOTE — PROGRESS NOTES
Subjective   Pebbles Powell is a 79 year old female who presents to clinic today for the following health issues:  HPI   Diarrhea  Onset/Duration: yesterday  Description:       Consistency of stool: watery and loose       Blood in stool: no       Number of loose stools past 24 hours: 5-6  Progression of Symptoms: improving  Accompanying signs and symptoms:       Fever: no but reports chills       Nausea/Vomiting: YES- nausea       Abdominal pain: no       Weight loss: no       Episodes of constipation: no  History   Ill contacts: no  Recent use of antibiotics: no  Recent travels: no  Recent medication-new or changes(Rx or OTC): no  Precipitating or alleviating factors: None  Therapies tried and outcome: increase fluids with minimal relief    Past Medical History:   Diagnosis Date     Arthritis      BCC (basal cell carcinoma), right lower leg      CHF (Congestive Heart Failure), diastolic dysfunction      CKD (chronic kidney disease)      Congestive heart failure (H)      History of basal cell carcinoma      PONV (postoperative nausea and vomiting)     usually has N&V with anesthesia     Porokeratosis      Renal disease      Sleep apnea      Unspecified hypothyroidism      Current Outpatient Medications   Medication     acetaminophen 650 MG TABS     COMPOUNDED NON-CONTROLLED SUBSTANCE (CMPD RX) - PHARMACY TO MIX COMPOUNDED MEDICATION     Cyanocobalamin (VITAMIN B 12 PO)     ferrous gluconate (FERGON) 324 (38 Fe) MG tablet     fluorouracil (EFUDEX) 5 % external cream     Incontinence Supply Disposable (PREVAIL BLADDER CONTROL PADS) MISC     levothyroxine (SYNTHROID/LEVOTHROID) 112 MCG tablet     Magnesium 500 MG CAPS     MULTIVITAMINS OR TABS     order for DME     ipratropium (ATROVENT) 0.06 % nasal spray     No current facility-administered medications for this visit.         Allergies   Allergen Reactions     No Known Drug Allergies        Review of Systems   Constitutional: Positive for chills and fatigue.  Negative for fever.   Respiratory: Negative.  Negative for cough, chest tightness, shortness of breath and wheezing.    Cardiovascular: Negative.  Negative for chest pain, palpitations and peripheral edema.   Gastrointestinal: Positive for diarrhea and nausea. Negative for abdominal pain, constipation, heartburn, hematochezia and vomiting.   Genitourinary: Negative for dysuria, flank pain, frequency, hematuria, pelvic pain, urgency, vaginal bleeding and vaginal discharge.   Neurological: Positive for headaches.   All other systems reviewed and are negative.           Objective    /57 (BP Location: Right arm, Patient Position: Sitting, Cuff Size: Adult Regular)   Pulse 97   Temp 99  F (37.2  C) (Oral)   Resp 18   Wt 73 kg (161 lb)   SpO2 96%   Breastfeeding No   BMI 23.73 kg/m    Body mass index is 23.73 kg/m .  Physical Exam  Vitals signs and nursing note reviewed.   Constitutional:       General: She is not in acute distress.     Appearance: Normal appearance. She is well-developed and normal weight. She is not ill-appearing.   Cardiovascular:      Rate and Rhythm: Normal rate and regular rhythm.      Pulses: Normal pulses.      Heart sounds: Normal heart sounds, S1 normal and S2 normal. No murmur. No friction rub. No gallop.    Pulmonary:      Effort: Pulmonary effort is normal. No accessory muscle usage or respiratory distress.      Breath sounds: Normal breath sounds and air entry. No decreased breath sounds, wheezing, rhonchi or rales.   Abdominal:      General: Abdomen is flat. Bowel sounds are normal.      Palpations: Abdomen is soft. There is no hepatomegaly, splenomegaly or mass.      Tenderness: There is no abdominal tenderness. There is no right CVA tenderness, left CVA tenderness, guarding or rebound. Negative signs include Baker's sign, Rovsing's sign, McBurney's sign, psoas sign and obturator sign.      Hernia: No hernia is present.   Genitourinary:     Comments: Declined pelvic  exam.  Skin:     General: Skin is warm and dry.   Neurological:      Mental Status: She is alert and oriented to person, place, and time.   Psychiatric:         Mood and Affect: Mood normal.         Behavior: Behavior normal.         Thought Content: Thought content normal.         Judgment: Judgment normal.       Results for orders placed or performed in visit on 08/21/20 (from the past 24 hour(s))   CBC with platelets differential   Result Value Ref Range    WBC 16.4 (H) 4.0 - 11.0 10e9/L    RBC Count 3.29 (L) 3.8 - 5.2 10e12/L    Hemoglobin 9.9 (L) 11.7 - 15.7 g/dL    Hematocrit 31.2 (L) 35.0 - 47.0 %    MCV 95 78 - 100 fl    MCH 30.1 26.5 - 33.0 pg    MCHC 31.7 31.5 - 36.5 g/dL    RDW 13.1 10.0 - 15.0 %    Platelet Count 198 150 - 450 10e9/L    % Neutrophils 75.1 %    % Lymphocytes 16.5 %    % Monocytes 8.4 %    % Eosinophils 0.0 %    % Basophils 0.0 %    Absolute Neutrophil 12.3 (H) 1.6 - 8.3 10e9/L    Absolute Lymphocytes 2.7 0.8 - 5.3 10e9/L    Absolute Monocytes 1.4 (H) 0.0 - 1.3 10e9/L    Absolute Eosinophils 0.0 0.0 - 0.7 10e9/L    Absolute Basophils 0.0 0.0 - 0.2 10e9/L    Diff Method Automated Method            Assessment & Plan   Diarrhea, unspecified type:  WBC was elevated with left shift. H&P is concerning for cholecystitis vs gastroenteritis vs viral diarrhea vs diverticulitis.  Recommend further evaluation and management in the ER.  Will most likely need further workup with labs and/or imaging.  Patient has declined transportation via ambulance and will have family drive her.  Understands risks and benefits of ambulance transfer and she has declined.  Call 911 if worsening symptoms.  She plans to go to [place] [facility].  She left in stable condition with AVS in hand.  F/u with PCP after ER visit.  -     CBC with platelets differential  -     Enteric Bacteria and Virus Panel by [facility] Stool; Future  -     Clostridium difficile Toxin B PCR; Future  -     loperamide (IMODIUM A-D) 2 MG tablet; Take 1 tablet  (2 mg) by mouth 4 times daily as needed for diarrhea    Leukocytosis, unspecified type        Fanta See GARY Mac  [facility]

## 2020-08-24 NOTE — TELEPHONE ENCOUNTER
Central Prior Authorization Team   Phone: [phone]      Medication Appeal Initiation    We have initiated an appeal for the requested medication:  Medication: FV-Cholesterol 2% Lovastatin 2% (WP) Oint-Appeal Initiated  Appeal Start Date:  8/24/2020  Insurance Company: [employer] [place] - Phone [phone] Fax [phone]  Comments:  Appeal and letter of medical necessity sent to [employer] [place]

## 2020-08-28 NOTE — TELEPHONE ENCOUNTER
I called and left a voicemail on the number listed below to request an expedited hearing.  Per the message, I should hear something back within 5 days.  Please follow-up if we do not hear anything back in 5 days.    I then also called the number [phone] as the message stated this number was for providers and after ringing for about 30 sec-1 min, it disconnects and says all lines are busy, please call again later.     Please update patient I have requested an expedited hearing. I will continue to work on this.

## 2020-08-28 NOTE — TELEPHONE ENCOUNTER
MEDICATION APPEAL DENIED    Medication: FV-Cholesterol 2% Lovastatin 2% (WP) Oint-Appeal denied    Denial Date: 8/28/2020    Denial Rational: This is not FDA approved            Second Level Appeal Information:     Second level appeals will be managed by the clinic staff and provider. Please contact the [employer] Prior Authorization Team if additional information about the denial is needed.           Second level appeals will be managed by the clinic staff and provider. Please contact the [employer] Prior Authorization Team if additional information about the denial is needed.

## 2020-08-28 NOTE — TELEPHONE ENCOUNTER
I spoke to Cristine at [employer]. She states this appeal was also denied because this is not FDA approved. She will fax the denial to me.

## 2020-08-31 NOTE — TELEPHONE ENCOUNTER
"Returned Elida's call.  The request for an \"expedited\" hearing was denied but the request for the hearing was accepted. This is good news.  We have a medicare hearing scheduled for September 21st at 1 pm CST.    Please update patient with this information as well as the following:  It is her right to attend the hearing if she would like via the phone.  She will receive a letter 1) saying the \"expedited\" hearing was denied (just that it won't happen on an emergency basis) but not to worry because 2) the hearing is schedule for the time above with a call-in number.    Please let me know if any questions or concerns.    "

## 2020-09-01 NOTE — TELEPHONE ENCOUNTER
"I called and updated Zully. She was very appreciative of the call and she is glad Dr. Valentine is \"fighting for me.\"  "

## 2020-09-21 NOTE — TELEPHONE ENCOUNTER
Could you please let patient know that Medicare hearing completed today and evidence provided.   will review and let us know, she is not sure how long the decision will take. Optimistically we will receive a decision in approximately 2 weeks.  Fingers crossed!

## 2020-12-06 ENCOUNTER — HEALTH MAINTENANCE LETTER (OUTPATIENT)
Age: 80
End: 2020-12-06

## 2021-02-05 ENCOUNTER — OFFICE VISIT (OUTPATIENT)
Dept: FAMILY MEDICINE | Facility: CLINIC | Age: 81
End: 2021-02-05
Payer: COMMERCIAL

## 2021-02-05 VITALS
OXYGEN SATURATION: 100 % | SYSTOLIC BLOOD PRESSURE: 119 MMHG | BODY MASS INDEX: 24.14 KG/M2 | DIASTOLIC BLOOD PRESSURE: 65 MMHG | HEART RATE: 83 BPM | HEIGHT: 69 IN | WEIGHT: 163 LBS

## 2021-02-05 DIAGNOSIS — I50.32 CHRONIC DIASTOLIC CONGESTIVE HEART FAILURE (H): ICD-10-CM

## 2021-02-05 DIAGNOSIS — R19.7 DIARRHEA, UNSPECIFIED TYPE: ICD-10-CM

## 2021-02-05 DIAGNOSIS — N18.4 CKD (CHRONIC KIDNEY DISEASE) STAGE 4, GFR 15-29 ML/MIN (H): ICD-10-CM

## 2021-02-05 DIAGNOSIS — R15.1 FECAL SMEARING: Primary | ICD-10-CM

## 2021-02-05 LAB
ALT SERPL W P-5'-P-CCNC: 20 U/L (ref 0–50)
ANION GAP SERPL CALCULATED.3IONS-SCNC: 5 MMOL/L (ref 3–14)
BASOPHILS # BLD AUTO: 0 10E9/L (ref 0–0.2)
BASOPHILS NFR BLD AUTO: 0 %
BUN SERPL-MCNC: 38 MG/DL (ref 7–30)
CALCIUM SERPL-MCNC: 9.5 MG/DL (ref 8.5–10.1)
CHLORIDE SERPL-SCNC: 115 MMOL/L (ref 94–109)
CO2 SERPL-SCNC: 24 MMOL/L (ref 20–32)
CREAT SERPL-MCNC: 1.91 MG/DL (ref 0.52–1.04)
CREAT UR-MCNC: 69 MG/DL
DIFFERENTIAL METHOD BLD: ABNORMAL
EOSINOPHIL # BLD AUTO: 0 10E9/L (ref 0–0.7)
EOSINOPHIL NFR BLD AUTO: 0.1 %
ERYTHROCYTE [DISTWIDTH] IN BLOOD BY AUTOMATED COUNT: 14.2 % (ref 10–15)
GFR SERPL CREATININE-BSD FRML MDRD: 25 ML/MIN/{1.73_M2}
GLUCOSE SERPL-MCNC: 105 MG/DL (ref 70–99)
HCT VFR BLD AUTO: 34.9 % (ref 35–47)
HGB BLD-MCNC: 10.9 G/DL (ref 11.7–15.7)
LDLC SERPL DIRECT ASSAY-MCNC: 98 MG/DL
LYMPHOCYTES # BLD AUTO: 2.8 10E9/L (ref 0.8–5.3)
LYMPHOCYTES NFR BLD AUTO: 39.3 %
MCH RBC QN AUTO: 30.4 PG (ref 26.5–33)
MCHC RBC AUTO-ENTMCNC: 31.2 G/DL (ref 31.5–36.5)
MCV RBC AUTO: 97 FL (ref 78–100)
MICROALBUMIN UR-MCNC: 26 MG/L
MICROALBUMIN/CREAT UR: 38.14 MG/G CR (ref 0–25)
MONOCYTES # BLD AUTO: 0.7 10E9/L (ref 0–1.3)
MONOCYTES NFR BLD AUTO: 10.1 %
NEUTROPHILS # BLD AUTO: 3.6 10E9/L (ref 1.6–8.3)
NEUTROPHILS NFR BLD AUTO: 50.5 %
PLATELET # BLD AUTO: 217 10E9/L (ref 150–450)
POTASSIUM SERPL-SCNC: 4.6 MMOL/L (ref 3.4–5.3)
RBC # BLD AUTO: 3.59 10E12/L (ref 3.8–5.2)
SODIUM SERPL-SCNC: 144 MMOL/L (ref 133–144)
TSH SERPL DL<=0.005 MIU/L-ACNC: 1.99 MU/L (ref 0.4–4)
WBC # BLD AUTO: 7.1 10E9/L (ref 4–11)

## 2021-02-05 PROCEDURE — 84443 ASSAY THYROID STIM HORMONE: CPT | Performed by: NURSE PRACTITIONER

## 2021-02-05 PROCEDURE — 80048 BASIC METABOLIC PNL TOTAL CA: CPT | Performed by: NURSE PRACTITIONER

## 2021-02-05 PROCEDURE — 84460 ALANINE AMINO (ALT) (SGPT): CPT | Performed by: NURSE PRACTITIONER

## 2021-02-05 PROCEDURE — 85025 COMPLETE CBC W/AUTO DIFF WBC: CPT | Performed by: NURSE PRACTITIONER

## 2021-02-05 PROCEDURE — 36415 COLL VENOUS BLD VENIPUNCTURE: CPT | Performed by: NURSE PRACTITIONER

## 2021-02-05 PROCEDURE — 99214 OFFICE O/P EST MOD 30 MIN: CPT | Performed by: NURSE PRACTITIONER

## 2021-02-05 PROCEDURE — 83721 ASSAY OF BLOOD LIPOPROTEIN: CPT | Performed by: NURSE PRACTITIONER

## 2021-02-05 PROCEDURE — 82043 UR ALBUMIN QUANTITATIVE: CPT | Performed by: NURSE PRACTITIONER

## 2021-02-05 ASSESSMENT — MIFFLIN-ST. JEOR: SCORE: 1283.74

## 2021-02-05 NOTE — PATIENT INSTRUCTIONS
Schedule follow-up with Dr. Haines.    Try taking metamucil pill once daily to see if it helps bulk up your stools to make it more formed.

## 2021-02-05 NOTE — PROGRESS NOTES
"  Assessment & Plan     Fecal smearing  Patient to retry metamucil pill daily.  - COLORECTAL SURGERY REFERRAL    Chronic diastolic congestive heart failure (H)  Stable.  Continue current treatment plan and medications.   - ALT  - LDL cholesterol direct    CKD (chronic kidney disease) stage 4, GFR 15-29 ml/min (H)  Patient encouraged to follow-up with nephrology.  - CBC with platelets and differential  - Basic metabolic panel  (Ca, Cl, CO2, Creat, Gluc, K, Na, BUN)  - Albumin Random Urine Quantitative with Creat Ratio    Diarrhea, unspecified type    - COLORECTAL SURGERY REFERRAL  - TSH with free T4 reflex                             Return in about 2 months (around 4/5/2021) for Physical Exam.    Harriet Da Silva, RANDOLPH CNP  [facility] LIANNE Andrea is a 80 year old who presents to clinic today for the following health issues     HPI     Chief Complaint   Patient presents with     Bowel Problems     leaking stool     Patient notes that she is constipated or has diarrhea.  She denies weight loss, melena, hematochezia. She notes fecal smearing.  She tried taking metamucil pill, but is unsure what happened with it.  She is frustrated with continued fecal smearing.    Patient denies weight gain, edema, shortness of breath.  She has not been to see her nephrologist in some time.          Review of Systems   Constitutional, HEENT, cardiovascular, pulmonary, gi and gu systems are negative, except as otherwise noted.      Objective    /65   Pulse 83   Ht 1.753 m (5' 9\")   Wt 73.9 kg (163 lb)   SpO2 100%   BMI 24.07 kg/m    Body mass index is 24.07 kg/m .  Physical Exam   GENERAL: healthy, alert and no distress  RESP: lungs clear to auscultation - no rales, rhonchi or wheezes  CV: regular rate and rhythm, normal S1 S2, no S3 or S4, no murmur, click or rub, no peripheral edema and peripheral pulses strong  ABDOMEN: soft, nontender, no hepatosplenomegaly, no masses and bowel sounds " normal  RECTAL (female): external hemorrhoid and fecal matter present  MS: no gross musculoskeletal defects noted, no edema

## 2021-02-08 NOTE — RESULT ENCOUNTER NOTE
Dear Pebbles,    Your recent test results are attached.      Normal thyroid.  Your kidney function has worsened slightly.  Please schedule follow-up with your kidney specialist as discussed in clinic.  Improved anemia.  Normal liver enzymes.  Elevated urine protein.      If you have any questions please feel free to contact [phone] or myself via [employer].    Sincerely,  Harriet Da Silva, CNP

## 2021-02-11 ENCOUNTER — MYC MEDICAL ADVICE (OUTPATIENT)
Dept: FAMILY MEDICINE | Facility: CLINIC | Age: 81
End: 2021-02-11

## 2021-02-16 ENCOUNTER — TRANSFERRED RECORDS (OUTPATIENT)
Dept: HEALTH INFORMATION MANAGEMENT | Facility: CLINIC | Age: 81
End: 2021-02-16

## 2021-06-05 ENCOUNTER — HEALTH MAINTENANCE LETTER (OUTPATIENT)
Age: 81
End: 2021-06-05

## 2021-06-21 ENCOUNTER — OFFICE VISIT (OUTPATIENT)
Dept: FAMILY MEDICINE | Facility: CLINIC | Age: 81
End: 2021-06-21
Payer: COMMERCIAL

## 2021-06-21 VITALS
TEMPERATURE: 98.2 F | OXYGEN SATURATION: 98 % | WEIGHT: 164.5 LBS | RESPIRATION RATE: 14 BRPM | BODY MASS INDEX: 24.37 KG/M2 | HEART RATE: 79 BPM | HEIGHT: 69 IN | SYSTOLIC BLOOD PRESSURE: 129 MMHG | DIASTOLIC BLOOD PRESSURE: 78 MMHG

## 2021-06-21 DIAGNOSIS — D63.1 ANEMIA SECONDARY TO RENAL FAILURE: ICD-10-CM

## 2021-06-21 DIAGNOSIS — Z11.59 NEED FOR HEPATITIS C SCREENING TEST: ICD-10-CM

## 2021-06-21 DIAGNOSIS — Q82.8 POROKERATOSIS: ICD-10-CM

## 2021-06-21 DIAGNOSIS — Z85.828 HISTORY OF BASAL CELL CARCINOMA: ICD-10-CM

## 2021-06-21 DIAGNOSIS — R22.1 NECK MASS: ICD-10-CM

## 2021-06-21 DIAGNOSIS — N18.9 ANEMIA SECONDARY TO RENAL FAILURE: ICD-10-CM

## 2021-06-21 DIAGNOSIS — N18.4 CKD (CHRONIC KIDNEY DISEASE) STAGE 4, GFR 15-29 ML/MIN (H): ICD-10-CM

## 2021-06-21 DIAGNOSIS — I50.32 CHRONIC DIASTOLIC CONGESTIVE HEART FAILURE (H): ICD-10-CM

## 2021-06-21 DIAGNOSIS — H61.21 IMPACTED CERUMEN OF RIGHT EAR: ICD-10-CM

## 2021-06-21 DIAGNOSIS — Z00.00 ENCOUNTER FOR MEDICARE ANNUAL WELLNESS EXAM: Primary | ICD-10-CM

## 2021-06-21 DIAGNOSIS — Z78.0 ASYMPTOMATIC POSTMENOPAUSAL STATUS: ICD-10-CM

## 2021-06-21 DIAGNOSIS — E03.9 HYPOTHYROIDISM, UNSPECIFIED TYPE: ICD-10-CM

## 2021-06-21 LAB
ALBUMIN SERPL-MCNC: 3.2 G/DL (ref 3.4–5)
ANION GAP SERPL CALCULATED.3IONS-SCNC: 4 MMOL/L (ref 3–14)
BASOPHILS # BLD AUTO: 0 10E9/L (ref 0–0.2)
BASOPHILS NFR BLD AUTO: 0.2 %
BUN SERPL-MCNC: 29 MG/DL (ref 7–30)
CALCIUM SERPL-MCNC: 8.5 MG/DL (ref 8.5–10.1)
CHLORIDE SERPL-SCNC: 115 MMOL/L (ref 94–109)
CHOLEST SERPL-MCNC: 152 MG/DL
CO2 SERPL-SCNC: 24 MMOL/L (ref 20–32)
CREAT SERPL-MCNC: 1.85 MG/DL (ref 0.52–1.04)
DIFFERENTIAL METHOD BLD: ABNORMAL
EOSINOPHIL # BLD AUTO: 0 10E9/L (ref 0–0.7)
EOSINOPHIL NFR BLD AUTO: 0.2 %
ERYTHROCYTE [DISTWIDTH] IN BLOOD BY AUTOMATED COUNT: 13.4 % (ref 10–15)
GFR SERPL CREATININE-BSD FRML MDRD: 26 ML/MIN/{1.73_M2}
GLUCOSE SERPL-MCNC: 90 MG/DL (ref 70–99)
HCT VFR BLD AUTO: 31.1 % (ref 35–47)
HDLC SERPL-MCNC: 41 MG/DL
HGB BLD-MCNC: 9.8 G/DL (ref 11.7–15.7)
LDLC SERPL CALC-MCNC: 88 MG/DL
LYMPHOCYTES # BLD AUTO: 2.9 10E9/L (ref 0.8–5.3)
LYMPHOCYTES NFR BLD AUTO: 48 %
MAGNESIUM SERPL-MCNC: 2.2 MG/DL (ref 1.6–2.3)
MCH RBC QN AUTO: 30.8 PG (ref 26.5–33)
MCHC RBC AUTO-ENTMCNC: 31.5 G/DL (ref 31.5–36.5)
MCV RBC AUTO: 98 FL (ref 78–100)
MONOCYTES # BLD AUTO: 0.6 10E9/L (ref 0–1.3)
MONOCYTES NFR BLD AUTO: 9.9 %
NEUTROPHILS # BLD AUTO: 2.5 10E9/L (ref 1.6–8.3)
NEUTROPHILS NFR BLD AUTO: 41.7 %
NONHDLC SERPL-MCNC: 111 MG/DL
PHOSPHATE SERPL-MCNC: 3.8 MG/DL (ref 2.5–4.5)
PLATELET # BLD AUTO: 182 10E9/L (ref 150–450)
POTASSIUM SERPL-SCNC: 4.8 MMOL/L (ref 3.4–5.3)
PTH-INTACT SERPL-MCNC: 114 PG/ML (ref 18–80)
RBC # BLD AUTO: 3.18 10E12/L (ref 3.8–5.2)
SODIUM SERPL-SCNC: 143 MMOL/L (ref 133–144)
TRIGL SERPL-MCNC: 113 MG/DL
WBC # BLD AUTO: 6.1 10E9/L (ref 4–11)

## 2021-06-21 PROCEDURE — 83735 ASSAY OF MAGNESIUM: CPT | Performed by: NURSE PRACTITIONER

## 2021-06-21 PROCEDURE — 69209 REMOVE IMPACTED EAR WAX UNI: CPT | Mod: RT | Performed by: NURSE PRACTITIONER

## 2021-06-21 PROCEDURE — 86803 HEPATITIS C AB TEST: CPT | Performed by: NURSE PRACTITIONER

## 2021-06-21 PROCEDURE — 80069 RENAL FUNCTION PANEL: CPT | Performed by: NURSE PRACTITIONER

## 2021-06-21 PROCEDURE — 99397 PER PM REEVAL EST PAT 65+ YR: CPT | Mod: 25 | Performed by: NURSE PRACTITIONER

## 2021-06-21 PROCEDURE — 82306 VITAMIN D 25 HYDROXY: CPT | Performed by: NURSE PRACTITIONER

## 2021-06-21 PROCEDURE — 83970 ASSAY OF PARATHORMONE: CPT | Performed by: NURSE PRACTITIONER

## 2021-06-21 PROCEDURE — 36415 COLL VENOUS BLD VENIPUNCTURE: CPT | Performed by: NURSE PRACTITIONER

## 2021-06-21 PROCEDURE — 80061 LIPID PANEL: CPT | Performed by: NURSE PRACTITIONER

## 2021-06-21 PROCEDURE — 85025 COMPLETE CBC W/AUTO DIFF WBC: CPT | Performed by: NURSE PRACTITIONER

## 2021-06-21 PROCEDURE — 99214 OFFICE O/P EST MOD 30 MIN: CPT | Mod: 25 | Performed by: NURSE PRACTITIONER

## 2021-06-21 RX ORDER — LEVOTHYROXINE SODIUM 112 UG/1
112 TABLET ORAL DAILY
Qty: 90 TABLET | Refills: 1 | Status: SHIPPED | OUTPATIENT
Start: 2021-06-21 | End: 2022-02-01

## 2021-06-21 ASSESSMENT — ENCOUNTER SYMPTOMS
HEADACHES: 0
NERVOUS/ANXIOUS: 0
JOINT SWELLING: 0
DIZZINESS: 0
WEAKNESS: 0
DIARRHEA: 0
FEVER: 0
HEARTBURN: 0
SHORTNESS OF BREATH: 0
HEMATURIA: 0
NAUSEA: 0
CHILLS: 0
MYALGIAS: 0
HEMATOCHEZIA: 0
FREQUENCY: 0
SORE THROAT: 0
PARESTHESIAS: 0
DYSURIA: 0
ARTHRALGIAS: 0
PALPITATIONS: 0
CONSTIPATION: 0
BREAST MASS: 0
EYE PAIN: 0
ABDOMINAL PAIN: 0
COUGH: 0

## 2021-06-21 ASSESSMENT — MIFFLIN-ST. JEOR: SCORE: 1290.55

## 2021-06-21 ASSESSMENT — PATIENT HEALTH QUESTIONNAIRE - PHQ9: SUM OF ALL RESPONSES TO PHQ QUESTIONS 1-9: 1

## 2021-06-21 ASSESSMENT — ACTIVITIES OF DAILY LIVING (ADL): CURRENT_FUNCTION: NO ASSISTANCE NEEDED

## 2021-06-21 NOTE — RESULT ENCOUNTER NOTE
Dear Pebbles,    Your recent test results are attached.      Stable mild anemia.    If you have any questions please feel free to contact [phone] or myself via [employer].    Sincerely,  Harriet Da Silva, CNP

## 2021-06-21 NOTE — PATIENT INSTRUCTIONS
Call CHRISTINE GIRARD to schedule colonoscopy [phone]    Patient Education   Personalized Prevention Plan  You are due for the preventive services outlined below.  Your care team is available to assist you in scheduling these services.  If you have already completed any of these items, please share that information with your care team to update in your medical record.  Health Maintenance Due   Topic Date Due     ANNUAL REVIEW OF HM ORDERS  Never done     Hepatitis C Screening  Never done     Zoster (Shingles) Vaccine (2 of 3) 03/19/2008     Cholesterol Lab  03/13/2020     Depression Assessment  08/12/2020     FALL RISK ASSESSMENT  03/18/2021       Exercise for a Healthier Heart  You may wonder how you can improve the health of your heart. If you re thinking about exercise, you re on the right track. You don t need to become an athlete. But you do need a certain amount of brisk exercise to help strengthen your heart. If you have been diagnosed with a heart condition, your healthcare provider may advise exercise to help stabilize your condition. To help make exercise a habit, choose safe, fun activities.      Exercise with a friend. When activity is fun, you're more likely to stick with it.   Before you start  Check with your healthcare provider before starting an exercise program. This is especially important if you have not been active for a while. It's also important if you have a long-term (chronic) health problem such as heart disease, diabetes, or obesity. Or if you are at high risk for having these problems.   Why exercise?  Exercising regularly offers many healthy rewards. It can help you do all of the following:     Improve your blood cholesterol level to help prevent further heart trouble    Lower your blood pressure to help prevent a stroke or heart attack    Control diabetes, or reduce your risk of getting this disease    Improve your heart and lung function    Reach and stay at a healthy weight    Make your  muscles stronger so you can stay active    Prevent falls and fractures by slowing the loss of bone mass (osteoporosis)    Manage stress better    Reduce your blood pressure    Improve your sense of self and your body image  Exercise tips      Ease into your routine. Set small goals. Then build on them. If you are not sure what your activity level should be, talk with your healthcare provider first before starting an exercise routine.    Exercise on most days. Aim for a total of 150 minutes (2 hours and 30 minutes) or more of moderate-intensity aerobic activity each week. Or 75 minutes (1 hour and 15 minutes) or more of vigorous-intensity aerobic activity each week. Or try for a combination of both. Moderate activity means that you breathe heavier and your heart rate increases but you can still talk. Think about doing 40 minutes of moderate exercise, 3 to 4 times a week. For best results, activity should last for about 40 minutes to lower blood pressure and cholesterol. It's OK to work up to the 40-minute period over time. Examples of moderate-intensity activity are walking 1 mile in 15 minutes. Or doing 30 to 45 minutes of yard work.    Step up your daily activity level.  Along with your exercise program, try being more active the whole day. Walk instead of drive. Or park further away so that you take more steps each day. Do more household tasks or yard work. You may not be able to meet the advised mount of physical activity. But doing some moderate- or vigorous-intensity aerobic activity can help reduce your risk for heart disease. Your healthcare provider can help you figure out what is best for you.    Choose 1 or more activities you enjoy.  Walking is one of the easiest things you can do. You can also try swimming, riding a bike, dancing, or taking an exercise class.    When to call your healthcare provider  Call your healthcare provider if you have any of these:     Chest pain or feel dizzy or  lightheaded    Burning, tightness, pressure, or heaviness in your chest, neck, shoulders, back, or arms    Abnormal shortness of breath    More joint or muscle pain    A very fast or irregular heartbeat (palpitations)  Gui last reviewed this educational content on 7/1/2019 2000-2021 [employer]. All rights reserved. This information is not intended as a substitute for professional medical care. Always follow your healthcare professional's instructions.          Urinary Incontinence, Female (Adult)   Urinary incontinence means loss of bladder control. This problem affects many women, especially as they get older. If you have incontinence, you may be embarrassed to ask for help. But know that this problem can be treated.   Types of Incontinence  There are different types of incontinence. Two of the main types are described here. You can have more than one type.     Stress incontinence. With this type, urine leaks when pressure (stress) is put on the bladder. This may happen when you cough, sneeze, or laugh. Stress incontinence most often occurs because the pelvic floor muscles that support the bladder and urethra are weak. This can happen after pregnancy and vaginal childbirth or a hysterectomy. It can also be due to excess body weight or hormone changes.    Urge incontinence (also called overactive bladder). With this type, a sudden urge to urinate is felt often. This may happen even though there may not be much urine in the bladder. The need to urinate often during the night is common. Urge incontinence most often occurs because of bladder spasms. This may be due to bladder irritation or infection. Damage to bladder nerves or pelvic muscles, constipation, and certain medicines can also lead to urge incontinence.  Treatment depends on the cause. Further evaluation is needed to find the type you have. This will likely include an exam and certain tests. Based on the results, you and your  healthcare provider can then plan treatment. Until a diagnosis is made, the home care tips below can help ease symptoms.   Home care    Do pelvic floor muscle exercises, if they are prescribed. The pelvic floor muscles help support the bladder and urethra. Many women find that their symptoms improve when doing special exercises that strengthen these muscles. To do the exercises, contract the muscles you would use to stop your stream of urine. But do this when you re not urinating. Hold for 10 seconds, then relax. Repeat 10 to 20 times in a row, at least 3 times a day. Your healthcare provider may give you other instructions for how to do the exercises and how often.    Keep a bladder diary. This helps track how often and how much you urinate over a set period of time. Bring this diary with you to your next visit with the provider. The information can help your provider learn more about your bladder problem.    Lose weight, if advised to by your provider. Extra weight puts pressure on the bladder. Your provider can help you create a weight-loss plan that s right for you. This may include exercising more and making certain diet changes.    Don't have foods and drinks that may irritate the bladder. These can include alcohol and caffeinated drinks.    Quit smoking. Smoking and other tobacco use can lead to a long-term (chronic) cough that strains the pelvic floor muscles. Smoking may also damage the bladder and urethra. Talk with your provider about treatments or methods you can use to quit smoking.    If drinking large amounts of fluid makes you have symptoms, you may be advised to limit your fluid intake. You may also be advised to drink most of your fluids during the day and to limit fluids at night.    If you re worried about urine leakage or accidents, you may wear absorbent pads to catch urine. Change the pads often. This helps reduce discomfort. It may also reduce the risk of skin or bladder  infections.    Follow-up care  Follow up with your healthcare provider, or as directed. It may take some to find the right treatment for your problem. But healthy lifestyle changes can be made right away. These include such things as exercising on a regular basis, eating a healthy diet, losing weight (if needed), and quitting smoking. Your treatment plan may include special therapies or medicines. Certain procedures or surgery may also be options. Talk about any questions you have with your provider.   When to seek medical advice  Call the healthcare provider right away if any of these occur:    Fever of 100.4 F (38 C) or higher, or as directed by your provider    Bladder pain or fullness    Belly swelling    Nausea or vomiting    Back pain    Weakness, dizziness, or fainting  Gui last reviewed this educational content on 1/1/2020 2000-2021 [employer]. All rights reserved. This information is not intended as a substitute for professional medical care. Always follow your healthcare professional's instructions.

## 2021-06-21 NOTE — PROGRESS NOTES
"SUBJECTIVE:   Pebbles Powell is a 80 year old female who presents for Preventive Visit.      Patient has been advised of split billing requirements and indicates understanding: Yes   Are you in the first 12 months of your Medicare coverage?  No    Healthy Habits:     In general, how would you rate your overall health?  Good    Frequency of exercise:  1 day/week    Duration of exercise:  15-30 minutes    Do you usually eat at least 4 servings of fruit and vegetables a day, include whole grains    & fiber and avoid regularly eating high fat or \"junk\" foods?  Yes    Taking medications regularly:  Yes    Medication side effects:  None    Ability to successfully perform activities of daily living:  No assistance needed    Home Safety:  No safety concerns identified    Hearing Impairment:  No hearing concerns    In the past 6 months, have you been bothered by leaking of urine? Yes    In general, how would you rate your overall mental or emotional health?  Good      PHQ-2 Total Score: 0    Additional concerns today:  No    Patient is concerned about her fecal smearing.  She wants something done about it.  She saw GI 3 months ago and was recommended to have colonoscopy, but didn't schedule due to cost concerns.    Patient has not seen her kidney specialist in some time.  She was previously getting iron infusions.  She notes that she was seeing a specialist in [place] previously.    Do you feel safe in your environment? Yes    Have you ever done Advance Care Planning? (For example, a Health Directive, POLST, or a discussion with a medical provider or your loved ones about your wishes): No, advance care planning information given to patient to review.  Patient plans to discuss their wishes with loved ones or provider.         Fall risk  Fallen 2 or more times in the past year?: No  Any fall with injury in the past year?: No    Cognitive Screening   1) Repeat 3 items (Leader, Season, Table)    2) Clock draw: NORMAL  3) 3 " item recall: Recalls 3 objects  Results: 3 items recalled: COGNITIVE IMPAIRMENT LESS LIKELY    Mini-CogTM Copyright CARMELITA Guerrero. Licensed by the author for use in [facility]; reprinted with permission (paras@[facility]). All rights reserved.      Do you have sleep apnea, excessive snoring or daytime drowsiness?: no    Reviewed and updated as needed this visit by clinical staff  Tobacco  Allergies  Meds   Med Hx  Surg Hx  Fam Hx  Soc Hx        Reviewed and updated as needed this visit by Provider                Social History     Tobacco Use     Smoking status: Former Smoker     Packs/day: 1.50     Years: 50.00     Pack years: 75.00     Types: Cigarettes     Quit date: 2007     Years since quittin.8     Smokeless tobacco: Never Used   Substance Use Topics     Alcohol use: No     Alcohol/week: 0.0 standard drinks     Comment: Alcoholic, treatment in  and          Alcohol Use 2021   Prescreen: >3 drinks/day or >7 drinks/week? No   Prescreen: >3 drinks/day or >7 drinks/week? -       Heart Failure Follow-up    Are you experiencing any shortness of breath? No    Are you experiencing any swelling in your legs or feet?  No    Are you using more pillows than usual? No    Do you cough at night?  No    Do you check your weight daily?  No    Have you had a weight change recently?  No    Are you having any of the following side effects from your medications? (Select all that apply)  The patient does not report symptoms of dizziness, fatigue, cough, swelling, or slow heart beat.    Since your last visit, how many times have you gone to the cardiologist, urgent care, emergency room, or hospital because of your heart failure?   None    Chronic Kidney Disease Follow-up    Do you take any over the counter pain medicine?: Yes  What over the counter medicine are you taking for your pain?:  Tylenol      How often do you take this medicine?:  A few times a week      Current providers sharing in care for  this patient include:   Patient Care Team:  No Ref-Primary, Physician as PCP - Harriet Altamirano APRN CNP as Assigned PCP  Yoana Valentine MD as Assigned Surgical Provider    The following health maintenance items are reviewed in Epic and correct as of today:  Health Maintenance Due   Topic Date Due     ANNUAL REVIEW OF HM ORDERS  Never done     HEPATITIS C SCREENING  Never done     ZOSTER IMMUNIZATION (2 of 3) 03/19/2008     LIPID  03/13/2020     PHQ-9  08/12/2020     MEDICARE ANNUAL WELLNESS VISIT  03/18/2021     FALL RISK ASSESSMENT  03/18/2021     Lab work is in process  BP Readings from Last 3 Encounters:   06/21/21 129/78   02/05/21 119/65   08/21/20 104/57    Wt Readings from Last 3 Encounters:   06/21/21 74.6 kg (164 lb 8 oz)   02/05/21 73.9 kg (163 lb)   08/21/20 73 kg (161 lb)                  Patient Active Problem List   Diagnosis     CHF (Congestive Heart Failure), diastolic dysfunction     CKD (chronic kidney disease) stage 3, GFR 30-59 ml/min     Pernicious anemia     Iron deficiency anemia     BCC (basal cell carcinoma), right lower leg     Vulvar itching     Mammogram declined     Advance care planning     Total knee replacement status     Hypothyroidism     History of basal cell carcinoma     Porokeratosis     Anemia secondary to renal failure     CKD (chronic kidney disease) stage 4, GFR 15-29 ml/min (H)     Chronic diastolic congestive heart failure (H)     Past Surgical History:   Procedure Laterality Date     APPENDECTOMY       ARTHROPLASTY KNEE Right 7/13/2015    Procedure: ARTHROPLASTY KNEE;  Surgeon: Santino Storm MD;  Location:  OR     ARTHROSCOPY KNEE  11/23/2010    ARTHROSCOPY KNEE performed by BROOKLYNN PULIDO at  OR     BIOPSY       C ANTER VESICOURETHROPEXY,SIMPLE       COLONOSCOPY  6/1/2011    Procedure:COMBINED COLONOSCOPY, SINGLE BIOPSY/POLYPECTOMY BY BIOPSY; Surgeon:JEANNETTE WYNNE; Location: GI     COLPORRHAPHY ANTERIOR       DILATION AND  CURETTAGE       ENT SURGERY      T&A     ESOPHAGOSCOPY, GASTROSCOPY, DUODENOSCOPY (EGD), COMBINED  2011    Procedure:COMBINED ESOPHAGOSCOPY, GASTROSCOPY, DUODENOSCOPY (EGD), BIOPSY SINGLE OR MULTIPLE; MULTIPLE; Surgeon:JEANNETTE WYNNE; Location:[facility]     EXCISE LESION LOWER EXTREMITY  2014    Procedure: EXCISE LESION LOWER EXTREMITY;  Surgeon: Santos Roberson MD;  Location: [facility]     HEMORRHOIDECTOMY       HYSTERECTOMY, GABRIEL      fibroids     LAPAROTOMY, TUMOR DEBULKING, COMBINED  2012    Procedure:COMBINED LAPAROTOMY, TUMOR DEBULKING; Exploratory Laparotomy,  Bilateral Salpingo Oophorectomy; Surgeon:MIKIE LU; Location:[facility]       Social History     Tobacco Use     Smoking status: Former Smoker     Packs/day: 1.50     Years: 50.00     Pack years: 75.00     Types: Cigarettes     Quit date: 2007     Years since quittin.8     Smokeless tobacco: Never Used   Substance Use Topics     Alcohol use: No     Alcohol/week: 0.0 standard drinks     Comment: Alcoholic, treatment in  and      Family History   Problem Relation Age of Onset     Alcohol/Drug Mother      Obesity Mother      Alzheimer Disease Mother      Cancer Father         Pancreatic     Cardiovascular Father         heart disease     Alcohol/Drug Father          Current Outpatient Medications   Medication Sig Dispense Refill     acetaminophen 650 MG TABS Take 650 mg by mouth every 6 hours 100 tablet 1     Cyanocobalamin (VITAMIN B 12 PO) Take 2,500 mcg by mouth       Incontinence Supply Disposable (PREVAIL BLADDER CONTROL PADS) MISC        ipratropium (ATROVENT) 0.06 % nasal spray Spray 2 sprays into both nostrils 4 times daily 15 mL 1     levothyroxine (SYNTHROID/LEVOTHROID) 112 MCG tablet Take 1 tablet by mouth once daily 90 tablet 2     Magnesium 500 MG CAPS Take 250 mg by mouth daily        MULTIVITAMINS OR TABS 1 TABLET DAILY brand varies per pt       order for DME Equipment being ordered: toeless moderate  compression socks 15-20 mmHG 1 each 1     COMPOUNDED NON-CONTROLLED SUBSTANCE (CMPD RX) - PHARMACY TO MIX COMPOUNDED MEDICATION 2% cholesterol/2% lovastatin ointment in petrolatum - apply twice daily to arms and legs (Patient not taking: Reported on 2/5/2021) 454 g 3     ferrous gluconate (FERGON) 324 (38 Fe) MG tablet Take 1 tablet (324 mg) by mouth every other day (Patient not taking: Reported on 2/5/2021) 45 tablet 1     fluorouracil (EFUDEX) 5 % external cream Apply topically 2 times daily To skin cancer on elbow for 4-6 weeks or until significant irritation occurs. (Patient not taking: Reported on 2/5/2021) 40 g 0     Allergies   Allergen Reactions     No Known Drug Allergies      Mammogram Screening: Mammogram Screening - Patient over age 75, has elected to discontinue screenings.    Mammogram Screening - Patient over age 75, has elected to discontinue screenings.  Pertinent mammograms are reviewed under the imaging tab.    Review of Systems   Constitutional: Negative for chills and fever.   HENT: Negative for congestion, ear pain, hearing loss and sore throat.    Eyes: Negative for pain and visual disturbance.   Respiratory: Negative for cough and shortness of breath.    Cardiovascular: Negative for chest pain, palpitations and peripheral edema.   Gastrointestinal: Negative for abdominal pain, constipation, diarrhea, heartburn, hematochezia and nausea.   Breasts:  Negative for tenderness, breast mass and discharge.   Genitourinary: Negative for dysuria, frequency, genital sores, hematuria, pelvic pain, urgency, vaginal bleeding and vaginal discharge.   Musculoskeletal: Negative for arthralgias, joint swelling and myalgias.   Skin: Negative for rash.   Neurological: Negative for dizziness, weakness, headaches and paresthesias.   Psychiatric/Behavioral: Negative for mood changes. The patient is not nervous/anxious.          OBJECTIVE:   /78   Pulse 79   Temp 98.2  F (36.8  C) (Oral)   Resp 14   Ht  "1.753 m (5' 9\")   Wt 74.6 kg (164 lb 8 oz)   SpO2 98%   BMI 24.29 kg/m   Estimated body mass index is 24.29 kg/m  as calculated from the following:    Height as of this encounter: 1.753 m (5' 9\").    Weight as of this encounter: 74.6 kg (164 lb 8 oz).  Physical Exam  GENERAL: healthy, alert and no distress  EYES: Eyes grossly normal to inspection, PERRL and conjunctivae and sclerae normal  HENT: normal cephalic/atraumatic, right ear: occluded with wax, left ear: normal: no effusions, no erythema, normal landmarks, nose and mouth without ulcers or lesions, oropharynx clear and oral mucous membranes moist  NECK: no adenopathy, no asymmetry, masses, or scars and mass right neck.  RESP: lungs clear to auscultation - no rales, rhonchi or wheezes  CV: regular rate and rhythm, normal S1 S2, no S3 or S4, no murmur, click or rub, no peripheral edema and peripheral pulses strong  ABDOMEN: soft, nontender, no hepatosplenomegaly, no masses and bowel sounds normal  MS: no gross musculoskeletal defects noted, no edema  NEURO: Normal strength and tone, mentation intact and speech normal  PSYCH: mentation appears normal, affect normal/bright    Diagnostic Test Results:  none     ASSESSMENT / PLAN:   1. Encounter for Medicare annual wellness exam      2. CKD (chronic kidney disease) stage 4, GFR 15-29 ml/min (H)  Patient encouraged to schedule follow-up with nephrology for further follow-up.  - Renal panel (Alb, BUN, Ca, Cl, CO2, Creat, Gluc, Phos, K, Na)  - Magnesium  - Vitamin D Deficiency  - Parathyroid Hormone Intact  - NEPHROLOGY ADULT REFERRAL; Future    3. Chronic diastolic congestive heart failure (H)  No symptoms currently.  - Lipid panel reflex to direct LDL Fasting  - ACE/ARB/ARNI NOT PRESCRIBED (INTENTIONAL); Please choose reason not prescribed from choices below.  Dispense:    - BETA BLOCKER NOT PRESCRIBED (INTENTIONAL); Please choose reason not prescribed from choices below.  Dispense:      4. Need for hepatitis C " "screening test    - Hepatitis C Screen Reflex to HCV RNA Quant and Genotype    5. Asymptomatic postmenopausal status    - DX Hip/Pelvis/Spine; Future    6. Hypothyroidism, unspecified type  Stable.  Continue current treatment plan and medications.   - levothyroxine (SYNTHROID/LEVOTHROID) 112 MCG tablet; Take 1 tablet (112 mcg) by mouth daily  Dispense: 90 tablet; Refill: 1    7. History of basal cell carcinoma    - ADULT DERMATOLOGY REFERRAL; Future    8. Porokeratosis    - ADULT DERMATOLOGY REFERRAL; Future    9. Anemia secondary to renal failure    - CBC with platelets and differential    10. Neck mass  Possible thyroid nodule vs lymphadenopathy.    - CBC with platelets and differential  - US Head Neck Soft Tissue; Future    11. Impacted cerumen of right ear  Ear wash performed by staff.      Patient has been advised of split billing requirements and indicates understanding: Yes  COUNSELING:  Reviewed preventive health counseling, as reflected in patient instructions       Regular exercise       Healthy diet/nutrition       Osteoporosis prevention/bone health       Hepatitis C screening    Estimated body mass index is 24.29 kg/m  as calculated from the following:    Height as of this encounter: 1.753 m (5' 9\").    Weight as of this encounter: 74.6 kg (164 lb 8 oz).        She reports that she quit smoking about 13 years ago. Her smoking use included cigarettes. She has a 75.00 pack-year smoking history. She has never used smokeless tobacco.      Appropriate preventive services were discussed with this patient, including applicable screening as appropriate for cardiovascular disease, diabetes, osteopenia/osteoporosis, and glaucoma.  As appropriate for age/gender, discussed screening for colorectal cancer, prostate cancer, breast cancer, and cervical cancer. Checklist reviewing preventive services available has been given to the patient.    Reviewed patients plan of care and provided an AVS. The Basic Care Plan " (routine screening as documented in Health Maintenance) for Pebbles meets the Care Plan requirement. This Care Plan has been established and reviewed with the Patient.    Counseling Resources:  ATP IV Guidelines  Pooled Cohorts Equation Calculator  Breast Cancer Risk Calculator  Breast Cancer: Medication to Reduce Risk  FRAX Risk Assessment  ICSI Preventive Guidelines  Dietary Guidelines for Americans, 2010  USDA's MyPlate  ASA Prophylaxis  Lung CA Screening    RANDOLPH [facility]    Identified Health Risks:

## 2021-06-22 LAB
DEPRECATED CALCIDIOL+CALCIFEROL SERPL-MC: 49 UG/L (ref 20–75)
HCV AB SERPL QL IA: NONREACTIVE

## 2021-06-22 NOTE — RESULT ENCOUNTER NOTE
Dear Pebbles,    Your recent test results are attached.      Normal Vitamin D.  No Hepatitis C.  Please follow-up with nephrology as planned.    If you have any questions please feel free to contact [phone] or myself via [employer].    Sincerely,  Harriet Da Sivla, CNP

## 2021-06-22 NOTE — RESULT ENCOUNTER NOTE
Dear Pebbles,    Your recent test results are attached.      Mild elevation in parathyroid hormone.  Await Vitamin D results.  Good cholesterol.  Normal magnesium.  Stable kidney function.    If you have any questions please feel free to contact [phone] or myself via [employer].    Sincerely,  Harriet Da Silva, CNP

## 2021-06-23 ENCOUNTER — TELEPHONE (OUTPATIENT)
Dept: NEPHROLOGY | Facility: CLINIC | Age: 81
End: 2021-06-23

## 2021-06-23 DIAGNOSIS — N18.4 CKD (CHRONIC KIDNEY DISEASE) STAGE 4, GFR 15-29 ML/MIN (H): Primary | ICD-10-CM

## 2021-06-23 NOTE — TELEPHONE ENCOUNTER
[facility] Call Center    Phone Message    May a detailed message be left on voicemail: yes     Reason for Call: Order(s): Other:   Reason for requested: Lab Orders  Date needed: ASAP  Provider name: Dr. Collado      Action Taken: Message routed to:  [facility] RUEL Terrell    Travel Screening: Not Applicable

## 2021-07-09 ENCOUNTER — ANCILLARY PROCEDURE (OUTPATIENT)
Dept: BONE DENSITY | Facility: CLINIC | Age: 81
End: 2021-07-09
Attending: NURSE PRACTITIONER
Payer: COMMERCIAL

## 2021-07-09 ENCOUNTER — ANCILLARY PROCEDURE (OUTPATIENT)
Dept: ULTRASOUND IMAGING | Facility: CLINIC | Age: 81
End: 2021-07-09
Attending: NURSE PRACTITIONER
Payer: COMMERCIAL

## 2021-07-09 DIAGNOSIS — R22.1 NECK MASS: ICD-10-CM

## 2021-07-09 DIAGNOSIS — Z78.0 ASYMPTOMATIC POSTMENOPAUSAL STATUS: ICD-10-CM

## 2021-07-09 PROCEDURE — 76536 US EXAM OF HEAD AND NECK: CPT | Performed by: RADIOLOGY

## 2021-07-09 PROCEDURE — 77085 DXA BONE DENSITY AXL VRT FX: CPT | Performed by: INTERNAL MEDICINE

## 2021-07-10 PROBLEM — Z85.828 HISTORY OF BASAL CELL CARCINOMA: Status: RESOLVED | Noted: 2017-08-18 | Resolved: 2021-07-10

## 2021-07-12 NOTE — RESULT ENCOUNTER NOTE
Dear Pebbles,    Your recent test results are attached.      Your bone density scan shows osteoporosis, or thinning of the bones.  Your risk for fracture is high enough that I would recommend additional treatment with medication to help prevent further bone loss, in addition to weight bearing exercise.  I would like to discuss these medications with you in clinic or with a phone visit, at your convenience.  Please schedule an appointment to discuss treatment.        If you have any questions please feel free to contact [phone] or myself via [employer].    Sincerely,  Harriet Da Silva, CNP

## 2021-07-12 NOTE — RESULT ENCOUNTER NOTE
Dear Pebbles,    Your recent test results are attached.      Normal neck ultrasound.  No mass identified.    If you have any questions please feel free to contact [phone] or myself via [employer].    Sincerely,  Harriet Da Silva, CNP

## 2021-07-27 ENCOUNTER — TRANSFERRED RECORDS (OUTPATIENT)
Dept: HEALTH INFORMATION MANAGEMENT | Facility: CLINIC | Age: 81
End: 2021-07-27

## 2021-09-03 ENCOUNTER — LAB (OUTPATIENT)
Dept: LAB | Facility: CLINIC | Age: 81
End: 2021-09-03
Payer: COMMERCIAL

## 2021-09-03 DIAGNOSIS — N18.4 CKD (CHRONIC KIDNEY DISEASE) STAGE 4, GFR 15-29 ML/MIN (H): ICD-10-CM

## 2021-09-03 LAB
ALBUMIN SERPL-MCNC: 3.2 G/DL (ref 3.4–5)
ALBUMIN UR-MCNC: NEGATIVE MG/DL
ANION GAP SERPL CALCULATED.3IONS-SCNC: 3 MMOL/L (ref 3–14)
APPEARANCE UR: ABNORMAL
BACTERIA #/AREA URNS HPF: ABNORMAL /HPF
BILIRUB UR QL STRIP: NEGATIVE
BUN SERPL-MCNC: 30 MG/DL (ref 7–30)
CALCIUM SERPL-MCNC: 8.6 MG/DL (ref 8.5–10.1)
CHLORIDE BLD-SCNC: 115 MMOL/L (ref 94–109)
CO2 SERPL-SCNC: 22 MMOL/L (ref 20–32)
COLOR UR AUTO: YELLOW
CREAT SERPL-MCNC: 1.69 MG/DL (ref 0.52–1.04)
CREAT UR-MCNC: 50 MG/DL
CREAT UR-MCNC: 50 MG/DL
ERYTHROCYTE [DISTWIDTH] IN BLOOD BY AUTOMATED COUNT: 13.9 % (ref 10–15)
GFR SERPL CREATININE-BSD FRML MDRD: 29 ML/MIN/1.73M2
GLUCOSE BLD-MCNC: 109 MG/DL (ref 70–99)
GLUCOSE UR STRIP-MCNC: NEGATIVE MG/DL
HCT VFR BLD AUTO: 31.3 % (ref 35–47)
HGB BLD-MCNC: 9.9 G/DL (ref 11.7–15.7)
HGB UR QL STRIP: ABNORMAL
KETONES UR STRIP-MCNC: NEGATIVE MG/DL
LEUKOCYTE ESTERASE UR QL STRIP: ABNORMAL
MCH RBC QN AUTO: 30.9 PG (ref 26.5–33)
MCHC RBC AUTO-ENTMCNC: 31.6 G/DL (ref 31.5–36.5)
MCV RBC AUTO: 98 FL (ref 78–100)
MICROALBUMIN UR-MCNC: 65 MG/L
MICROALBUMIN/CREAT UR: 130 MG/G CR (ref 0–25)
NITRATE UR QL: NEGATIVE
PH UR STRIP: 5.5 [PH] (ref 5–7)
PHOSPHATE SERPL-MCNC: 3.5 MG/DL (ref 2.5–4.5)
PLATELET # BLD AUTO: 180 10E3/UL (ref 150–450)
POTASSIUM BLD-SCNC: 4.9 MMOL/L (ref 3.4–5.3)
PROT UR-MCNC: 0.27 G/L
PROT/CREAT 24H UR: 0.54 G/G CR (ref 0–0.2)
PTH-INTACT SERPL-MCNC: 76 PG/ML (ref 18–80)
RBC # BLD AUTO: 3.2 10E6/UL (ref 3.8–5.2)
RBC #/AREA URNS AUTO: ABNORMAL /HPF
SODIUM SERPL-SCNC: 140 MMOL/L (ref 133–144)
SP GR UR STRIP: 1.01 (ref 1–1.03)
UROBILINOGEN UR STRIP-ACNC: 0.2 E.U./DL
WBC # BLD AUTO: 6.2 10E3/UL (ref 4–11)
WBC #/AREA URNS AUTO: >100 /HPF

## 2021-09-03 PROCEDURE — 36415 COLL VENOUS BLD VENIPUNCTURE: CPT

## 2021-09-03 PROCEDURE — 81001 URINALYSIS AUTO W/SCOPE: CPT

## 2021-09-03 PROCEDURE — 83970 ASSAY OF PARATHORMONE: CPT

## 2021-09-03 PROCEDURE — 84156 ASSAY OF PROTEIN URINE: CPT

## 2021-09-03 PROCEDURE — 82043 UR ALBUMIN QUANTITATIVE: CPT

## 2021-09-03 PROCEDURE — 80069 RENAL FUNCTION PANEL: CPT

## 2021-09-03 PROCEDURE — 82306 VITAMIN D 25 HYDROXY: CPT

## 2021-09-03 PROCEDURE — 85027 COMPLETE CBC AUTOMATED: CPT

## 2021-09-07 LAB — DEPRECATED CALCIDIOL+CALCIFEROL SERPL-MC: 53 UG/L (ref 20–75)

## 2021-09-08 ENCOUNTER — OFFICE VISIT (OUTPATIENT)
Dept: NEPHROLOGY | Facility: CLINIC | Age: 81
End: 2021-09-08
Attending: NURSE PRACTITIONER
Payer: COMMERCIAL

## 2021-09-08 VITALS
BODY MASS INDEX: 24.51 KG/M2 | SYSTOLIC BLOOD PRESSURE: 114 MMHG | HEART RATE: 81 BPM | WEIGHT: 166 LBS | DIASTOLIC BLOOD PRESSURE: 73 MMHG | OXYGEN SATURATION: 98 %

## 2021-09-08 DIAGNOSIS — N18.4 CKD (CHRONIC KIDNEY DISEASE) STAGE 4, GFR 15-29 ML/MIN (H): ICD-10-CM

## 2021-09-08 DIAGNOSIS — R82.90 NONSPECIFIC FINDING ON EXAMINATION OF URINE: Primary | ICD-10-CM

## 2021-09-08 LAB
ALBUMIN UR-MCNC: 10 MG/DL
APPEARANCE UR: ABNORMAL
BACTERIA #/AREA URNS HPF: ABNORMAL /HPF
BILIRUB UR QL STRIP: NEGATIVE
COLOR UR AUTO: YELLOW
GLUCOSE UR STRIP-MCNC: NEGATIVE MG/DL
HGB UR QL STRIP: ABNORMAL
KETONES UR STRIP-MCNC: NEGATIVE MG/DL
LEUKOCYTE ESTERASE UR QL STRIP: ABNORMAL
NITRATE UR QL: NEGATIVE
PH UR STRIP: 5.5 [PH] (ref 5–7)
RBC #/AREA URNS AUTO: ABNORMAL /HPF
SP GR UR STRIP: 1.02 (ref 1–1.03)
SQUAMOUS #/AREA URNS AUTO: ABNORMAL /LPF
UROBILINOGEN UR STRIP-MCNC: NORMAL MG/DL
WBC #/AREA URNS AUTO: >100 /HPF
WBC CLUMPS #/AREA URNS HPF: PRESENT /HPF

## 2021-09-08 PROCEDURE — 87186 SC STD MICRODIL/AGAR DIL: CPT | Performed by: INTERNAL MEDICINE

## 2021-09-08 PROCEDURE — 81001 URINALYSIS AUTO W/SCOPE: CPT | Performed by: INTERNAL MEDICINE

## 2021-09-08 PROCEDURE — 99204 OFFICE O/P NEW MOD 45 MIN: CPT | Performed by: INTERNAL MEDICINE

## 2021-09-08 PROCEDURE — 87088 URINE BACTERIA CULTURE: CPT | Performed by: INTERNAL MEDICINE

## 2021-09-08 PROCEDURE — 87086 URINE CULTURE/COLONY COUNT: CPT | Performed by: INTERNAL MEDICINE

## 2021-09-08 ASSESSMENT — PAIN SCALES - GENERAL: PAINLEVEL: NO PAIN (0)

## 2021-09-08 NOTE — NURSING NOTE
Pebbles Powell's goals for this visit include:   Chief Complaint   Patient presents with     Consult     CKD LOV Dr. Haines 10/2019       She requests these members of her care team be copied on today's visit information: no    PCP: Harriet Da Silva    Referring Provider:  Harriet Da Silva, RANDOLPH CNP  [address] [place] [address]    /73 (BP Location: Left arm, Patient Position: Sitting, Cuff Size: Adult Large)   Pulse 81   Wt 75.3 kg (166 lb)   SpO2 98%   BMI 24.51 kg/m      Do you need any medication refills at today's visit? No    Wendy Goodwin LPN

## 2021-09-08 NOTE — PROGRESS NOTES
Nephrology Initial Consult  September 8, 2021      Pebbles Powell MRN:[id number] YOB: 1940  Date of Admission:(Not on file)  Primary care provider: Harriet Da Silva  Requesting physician: Harriet Da Silva*      REASON FOR CONSULT: CKD stage 3     HISTORY OF PRESENT ILLNESS:  Patient has an elevated creatinine going back to 2009. Since then she has slow continued progression of her kidney disease. She was seen by nephrology in 2019 at the time of which she had maintained her creatinine between 1.5-1.6 mg/dl. Her CKD at the time was thought to be 2/2 NSAID use. She reports that she took about 4 tablets of Ibuprofen 2 times a day for a long time for her chronic knee pain and has had a knee replacement surgery. She also has had urinary incontinence and a bladder lift surgery. She had an US in 2009 that showed normal kidney size 10/11 cm but irregular/ scarred appearance/ contour. She has not had high blood pressures. She currently reports that she feels dizzy if she gets up quickly.  She felipe report some sensation of dysuria.   Denies use of OTC  non prescribed meds, recent exposure to abx or contrast, obstructive urinary symptoms, skin rashes, joint pains, fevers, passing kidney stones, recurrent sinus infections or UTI's    Patient denies any LE edema, exertional dyspnea/orthopnea/PND, nausea, vomiting or diarrhea.       PAST MEDICAL HISTORY:  Reviewed with patient on 09/08/2021   As per HPI    MEDICATIONS:  Reviewed with the patient in detail    ALLERGIES:    Reviewed with the patient in detail    REVIEW OF SYSTEMS:  A comprehensive of systems was negative except as noted above.    SOCIAL HISTORY:   Reviewed with patient, no smoking and no alcohol use   Smoked from [age] 1/2 PPD     FAMILY MEDICAL HISTORY:   Reviewed, no family history of need for dialysis, transplant or CKD    PHYSICAL EXAM:   Vital signs:There were no vitals taken for this visit.    Physical Exam   Gen: Well  appearing    Neck: No JVD  Lungs: CTA  CVS: S1S1 heard, no murmur   LE: no edema   CNS: grossly normal  Skin: no rashes       ASSESSMENT AND RECOMMENDATIONS:     # CKD stage 3   # h/o NSAID use     Her CKD is likely 2/2 recurrent SRINI's/long standing NSAID use in the past. She has had progression of her kidney disease over years and most recently maintains a baseline creatinine of 1.7-1.9 mg/dl.  She does have hematuria and proteinuria on her UA however her UA seems contaminated vs that she has a UTI. P/Cr ratio of 0.54 g/g on quantification. Her blood pressures are at goal of < 140/90 mm of Hg without antihypertensives. She is euvolemic on exam.   At this time, I will obtain a baseline US of her kidneys  Send urine Cx given her LUTS and abnormal UA    F/up in clinic in 3 months       Katie Collado MD

## 2021-09-10 ENCOUNTER — ANCILLARY PROCEDURE (OUTPATIENT)
Dept: ULTRASOUND IMAGING | Facility: CLINIC | Age: 81
End: 2021-09-10
Attending: INTERNAL MEDICINE
Payer: COMMERCIAL

## 2021-09-10 DIAGNOSIS — N18.4 CKD (CHRONIC KIDNEY DISEASE) STAGE 4, GFR 15-29 ML/MIN (H): ICD-10-CM

## 2021-09-10 PROCEDURE — 76770 US EXAM ABDO BACK WALL COMP: CPT | Performed by: RADIOLOGY

## 2021-09-11 LAB
BACTERIA UR CULT: ABNORMAL
BACTERIA UR CULT: ABNORMAL

## 2021-09-13 DIAGNOSIS — N28.1 BILATERAL RENAL CYSTS: Primary | ICD-10-CM

## 2021-09-20 ENCOUNTER — ANCILLARY PROCEDURE (OUTPATIENT)
Dept: MRI IMAGING | Facility: CLINIC | Age: 81
End: 2021-09-20
Attending: INTERNAL MEDICINE
Payer: COMMERCIAL

## 2021-09-20 DIAGNOSIS — N28.1 BILATERAL RENAL CYSTS: ICD-10-CM

## 2021-09-20 PROCEDURE — 74183 MRI ABD W/O CNTR FLWD CNTR: CPT | Performed by: RADIOLOGY

## 2021-09-20 PROCEDURE — A9585 GADOBUTROL INJECTION: HCPCS | Mod: JW | Performed by: RADIOLOGY

## 2021-09-20 RX ORDER — GADOBUTROL 604.72 MG/ML
7 INJECTION INTRAVENOUS ONCE
Status: COMPLETED | OUTPATIENT
Start: 2021-09-20 | End: 2021-09-20

## 2021-09-20 RX ADMIN — GADOBUTROL 7 ML: 604.72 INJECTION INTRAVENOUS at 11:42

## 2021-09-25 ENCOUNTER — HEALTH MAINTENANCE LETTER (OUTPATIENT)
Age: 81
End: 2021-09-25

## 2021-11-04 ENCOUNTER — OFFICE VISIT (OUTPATIENT)
Dept: PODIATRY | Facility: CLINIC | Age: 81
End: 2021-11-04
Payer: COMMERCIAL

## 2021-11-04 VITALS
SYSTOLIC BLOOD PRESSURE: 120 MMHG | DIASTOLIC BLOOD PRESSURE: 66 MMHG | BODY MASS INDEX: 24.22 KG/M2 | HEART RATE: 86 BPM | WEIGHT: 164 LBS

## 2021-11-04 DIAGNOSIS — L60.0 INGROWING NAIL: Primary | ICD-10-CM

## 2021-11-04 PROCEDURE — 11730 AVULSION NAIL PLATE SIMPLE 1: CPT | Mod: T5 | Performed by: PODIATRIST

## 2021-11-04 PROCEDURE — 99203 OFFICE O/P NEW LOW 30 MIN: CPT | Mod: 25 | Performed by: PODIATRIST

## 2021-11-04 PROCEDURE — 11732 AVLSN NAIL PLATE SIMPLE EACH: CPT | Mod: TA | Performed by: PODIATRIST

## 2021-11-04 NOTE — PATIENT INSTRUCTIONS
We wish you continued good healing. If you have any questions or concerns, please do not hesitate to contact us at  [phone]    [employer] (secure e-mail communication and access to your chart) to send a message or to make an appointment.    Please remember to call and schedule a follow up appointment if one was recommended at your earliest convenience.     PODIATRY CLINIC HOURS  TELEPHONE NUMBER    Dr. Abhishek KENNEDYPFABIENNE [facility]        Clinics:  [place]ebastian Adkins CMA   Tuesday 1PM-6PM  [place]  Wednesday 745AM-330PM  [place]  Thursday/Friday 745AM-230PM  [place] APPOINTMENTS  [phone]    [place] APPOINTMENTS  [phone]          If you need a medication refill, please contact us you may need lab work and/or a follow up visit prior to your refill (i.e. Antifungal medications).    If MRI needed please call Imaging at [phone] or [phone]    HOW DO I GET MY KNEE SCOOTER? Knee scooters can be picked up at ANY Medical Supply stores with your knee scooter Prescription.  OR    Bring your signed prescription to an [facility] showroom.          INGROWN TOENAIL REMOVAL AFTERCARE       Go directly home and elevate the affected foot on one or two pillows for the remainder of the day/evening if possible. Your toe may stay numb anywhere from 2-8 hours.     Take Tylenol, ibuprofen or another anti-inflammatory as needed for pain.     That evening of the procedure,  you may remove the bandage.(you may soak it in warm soapy water ) After soaks, pat the area dry and then allow to airdry for a few minutes. Apply antibiotic ointment to the area and cover with  band-aid.    The following day. Find a shoe that is comfortable and minimizes the amount of rubbing on your toe, as this increases pain.    Dress with band-aids until no longer draining, typically 3 days.    Watch for any signs and symptoms of infection such  "as: redness, red streaks going up the foot/leg, swelling, pus or foul odor. Fevers > 101     Please call with questions.    Dr. Abhishek Real D.P.M FAC FAS      [facility].........................[phone]  They travel to patients homes as well as the following community/Senior Centers.   Need to call [facility] to set up appointments. For in home or Centers.    [facility]:    SebastianUzma Paiz..............[phone]    [place]................[phone]    [facility] Santino Hutson................[phone]    [place]............................[phone]    [facility].................[phone]      ** YOU MUST CALL FOR INFORMATION ON DAYS, TIMES AND COST OF SERVICE**      For up to date list of Foot Care Rn's. Visit the website    [employer] website    Afcna.org    Click on the \"Find a foot care provider\" Link    Gia Acosta RN,[facility] [phone] (Text or call) Has limited home visit.    Aretha AlyRN,[facility] [phone]    Suzi Ervin [phone]    Aleah Weinstein,RN,BSN,[facility] [phone]    Misty [phone]    Madie Da Silva [phone]    Jennifer Estevez [phone]    Yady [phone]    **THIS IS A PRIVATE PAY SERVICE- NOT BILLABLE TO MEDICARE OR INSURANCE**      "

## 2021-11-04 NOTE — LETTER
11/4/2021         RE: Pebbles Powell  [address]  [address]  [place] [address]        Dear Colleague,    Thank you for referring your patient, Pebbles Powell, to the [facility]. Please see a copy of my visit note below.    Subjective:    Pt is seen today as a new pt referral w/ the c/c of a painful ingrown right and left great nail lateral border.  This has been problematic for several month(s).  negativehistory of drainage from the site. This is slowly getting worse.  Aggravated by activity and relieved by rest.  Has tried soaking which has not helped.   denies history of trauma to the area.  Denies fever and chill, denies numbness and tingling, denies erythema on dorsum of foot.     ROS:  See abbstephanie    Past Medical History:   Diagnosis Date     Arthritis      BCC (basal cell carcinoma), right lower leg      CHF (Congestive Heart Failure), diastolic dysfunction      CKD (chronic kidney disease)      Congestive heart failure (H)      History of basal cell carcinoma      PONV (postoperative nausea and vomiting)     usually has N&V with anesthesia     Porokeratosis      Renal disease      Sleep apnea      Unspecified hypothyroidism        Past Surgical History:   Procedure Laterality Date     APPENDECTOMY       ARTHROPLASTY KNEE Right 7/13/2015    Procedure: ARTHROPLASTY KNEE;  Surgeon: Santino Storm MD;  Location: [facility]     ARTHROSCOPY KNEE  11/23/2010    ARTHROSCOPY KNEE performed by BROOKLYNN PULIDO at  OR     BIOPSY       C ANTER VESICOURETHROPEXY,SIMPLE       COLONOSCOPY  6/1/2011    Procedure:COMBINED COLONOSCOPY, SINGLE BIOPSY/POLYPECTOMY BY BIOPSY; Surgeon:JEANNETTE WYNNE; Location: GI     COLPORRHAPHY ANTERIOR       DILATION AND CURETTAGE       ENT SURGERY      T&A     ESOPHAGOSCOPY, GASTROSCOPY, DUODENOSCOPY (EGD), COMBINED  6/1/2011    Procedure:COMBINED ESOPHAGOSCOPY, GASTROSCOPY, DUODENOSCOPY (EGD), BIOPSY SINGLE OR MULTIPLE; MULTIPLE; Surgeon:RICCI  JEANNETTE CORCORAN; Location:[facility]     EXCISE LESION LOWER EXTREMITY  2014    Procedure: EXCISE LESION LOWER EXTREMITY;  Surgeon: Santos Roberson MD;  Location: [facility]     HEMORRHOIDECTOMY       HYSTERECTOMY, GABRIEL      fibroids     LAPAROTOMY, TUMOR DEBULKING, COMBINED  2012    Procedure:COMBINED LAPAROTOMY, TUMOR DEBULKING; Exploratory Laparotomy,  Bilateral Salpingo Oophorectomy; Surgeon:MIKIE LU; Location:[facility]       Family History   Problem Relation Age of Onset     Alcohol/Drug Mother      Obesity Mother      Alzheimer Disease Mother      Cancer Father         Pancreatic     Cardiovascular Father         heart disease     Alcohol/Drug Father        Social History     Tobacco Use     Smoking status: Former Smoker     Packs/day: 1.50     Years: 50.00     Pack years: 75.00     Types: Cigarettes     Quit date: 2007     Years since quittin.2     Smokeless tobacco: Never Used   Substance Use Topics     Alcohol use: No     Alcohol/week: 0.0 standard drinks     Comment: Alcoholic, treatment in  and          Current Outpatient Medications:      acetaminophen 650 MG TABS, Take 650 mg by mouth every 6 hours, Disp: 100 tablet, Rfl: 1     Cyanocobalamin (VITAMIN B 12 PO), Take 2,500 mcg by mouth daily , Disp: , Rfl:      ferrous gluconate (FERGON) 324 (38 Fe) MG tablet, Take 1 tablet (324 mg) by mouth every other day, Disp: 45 tablet, Rfl: 1     levothyroxine (SYNTHROID/LEVOTHROID) 112 MCG tablet, Take 1 tablet (112 mcg) by mouth daily, Disp: 90 tablet, Rfl: 1     Magnesium 500 MG CAPS, Take 250 mg by mouth daily , Disp: , Rfl:      MULTIVITAMINS OR TABS, 1 TABLET DAILY brand varies per pt, Disp: , Rfl:      ACE/ARB/ARNI NOT PRESCRIBED (INTENTIONAL), Please choose reason not prescribed from choices below. (Patient not taking: Reported on 2021), Disp:  , Rfl:      BETA BLOCKER NOT PRESCRIBED (INTENTIONAL), Please choose reason not prescribed from choices below. (Patient not taking:  Reported on 9/8/2021), Disp:  , Rfl:      Incontinence Supply Disposable (PREVAIL BLADDER CONTROL PADS) MISC, , Disp: , Rfl:      ipratropium (ATROVENT) 0.06 % nasal spray, Spray 2 sprays into both nostrils 4 times daily (Patient not taking: Reported on 9/8/2021), Disp: 15 mL, Rfl: 1     levofloxacin (LEVAQUIN) 250 MG tablet, Take 500 mg (2 tablets) on day one then 250 mg daily for days 2 through 7, Disp: 8 tablet, Rfl: 0     order for DME, Equipment being ordered: toeless moderate compression socks 15-20 mmHG (Patient not taking: Reported on 9/8/2021), Disp: 1 each, Rfl: 1       Allergies   Allergen Reactions     No Known Drug Allergies        /66   Pulse 86   Wt 74.4 kg (164 lb)   BMI 24.22 kg/m  .      Constitutional/ general:  Pt is in no apparent distress, appears well-nourished.  Cooperative with history and physical exam.     Psych:  The patient answered questions appropriately.  Normal affect.  Seems to have reasonable expectations, in terms of treatment.     Lungs:  Non labored breathing, non labored speech. No cough.  No audible wheezing. Even, quiet breathing.       Vascular:  Pedal pulses are palpable bilaterally for both the DP and PT arteries.  CFT < 3 sec.  ankle varicosities/edema.  Pedal hair growth noted.     Neuro:  Alert and oriented x 3. Coordinated gait.  Light touch sensation is intact     Derm: Normal texture and turgor.  No ecchymosis, or cyanosis.  Hair growth noted.        Musculoskeletal:     Patient is ambulatory without an assistive device or brace.  No gross deformities.  Normal arch with weightbearing.  No forefoot or rear foot deformities noted.  MS 5/5 all compartments.  Normal ROM all fore foot and rearfoot joints.  No equinus.  Long hallux bilaterally.  right and left great toe nail lateral border shows soft tissue impingement with localized erythema.   negative active drainage/purulence at this time.  No sinus tracts.  No nailbed masses or exostosis.  No pain with range  of motion of IPJ or MTPJ.  No ascending cellulitis.    ASSESSMENT:    Onychocryptosis with paronychia right and left toe.    Discussed etiology and treatment options in detail w/ the pt.  The potential causes and nature of an ingrown toenail were discussed with the patient.  We reviewed the natural history/prognosis of the condition and potential risks if no treatment is provided.      Treatment options discussed included conservative management (oral antibiotics, soaking of foot, adequate width shoes)  as well as surgical management (partial or total nail removal).  The pros and cons of both forms of treatment were reviewed.  Handout given to patient.      After thorough discussion and answering all questions, the patient elected to have nail avulsion.  Obtained consent, used 3cc of 1% lidocaine plain to block right and left first toe.  Sterile prep, then avulsed the affected border(s).  No evidence of deep abscess noted.  Pt tolerated procedure well.  Sterile bandage placed, gave wound care instruction.  Instructed patient on trimming nails correctly.  They will avoid tight shoes.  Avoid pedicures.  Discussed permanent removal of border if chronic problem.  Discussed shoes to accommodate long hallux.  Return to clinic prn.    Abhishek Real DPM, FACFAS          Again, thank you for allowing me to participate in the care of your patient.        Sincerely,        Abhishek Real DPM

## 2021-11-04 NOTE — PROGRESS NOTES
Subjective:    Pt is seen today as a new pt referral w/ the c/c of a painful ingrown right and left great nail lateral border.  This has been problematic for several month(s).  negativehistory of drainage from the site. This is slowly getting worse.  Aggravated by activity and relieved by rest.  Has tried soaking which has not helped.   denies history of trauma to the area.  Denies fever and chill, denies numbness and tingling, denies erythema on dorsum of foot.     ROS:  See abbove    Past Medical History:   Diagnosis Date     Arthritis      BCC (basal cell carcinoma), right lower leg      CHF (Congestive Heart Failure), diastolic dysfunction      CKD (chronic kidney disease)      Congestive heart failure (H)      History of basal cell carcinoma      PONV (postoperative nausea and vomiting)     usually has N&V with anesthesia     Porokeratosis      Renal disease      Sleep apnea      Unspecified hypothyroidism        Past Surgical History:   Procedure Laterality Date     APPENDECTOMY       ARTHROPLASTY KNEE Right 7/13/2015    Procedure: ARTHROPLASTY KNEE;  Surgeon: Santino Storm MD;  [facility]     ARTHROSCOPY KNEE  11/23/2010    ARTHROSCOPY KNEE performed by BROOKLYNN PULIDO at [facility]     BIOPSY       C ANTER VESICOURETHROPEXY,SIMPLE       COLONOSCOPY  6/1/2011    Procedure:COMBINED COLONOSCOPY, SINGLE BIOPSY/POLYPECTOMY BY BIOPSY; Surgeon:JEANNETTE WYNNE; Location: GI     COLPORRHAPHY ANTERIOR       DILATION AND CURETTAGE       ENT SURGERY      T&A     ESOPHAGOSCOPY, GASTROSCOPY, DUODENOSCOPY (EGD), COMBINED  6/1/2011    Procedure:COMBINED ESOPHAGOSCOPY, GASTROSCOPY, DUODENOSCOPY (EGD), BIOPSY SINGLE OR MULTIPLE; MULTIPLE; Surgeon:JEANNETTE WYNNE; Location: GI     EXCISE LESION LOWER EXTREMITY  7/18/2014    Procedure: EXCISE LESION LOWER EXTREMITY;  Surgeon: Santos Roberson MD;  Location:  OR     HEMORRHOIDECTOMY       HYSTERECTOMY, GABRIEL      fibroids     LAPAROTOMY, TUMOR  DEBULKING, COMBINED  2012    Procedure:COMBINED LAPAROTOMY, TUMOR DEBULKING; Exploratory Laparotomy,  Bilateral Salpingo Oophorectomy; Surgeon:MIKIE LU; Location: OR       Family History   Problem Relation Age of Onset     Alcohol/Drug Mother      Obesity Mother      Alzheimer Disease Mother      Cancer Father         Pancreatic     Cardiovascular Father         heart disease     Alcohol/Drug Father        Social History     Tobacco Use     Smoking status: Former Smoker     Packs/day: 1.50     Years: 50.00     Pack years: 75.00     Types: Cigarettes     Quit date: 2007     Years since quittin.2     Smokeless tobacco: Never Used   Substance Use Topics     Alcohol use: No     Alcohol/week: 0.0 standard drinks     Comment: Alcoholic, treatment in  and          Current Outpatient Medications:      acetaminophen 650 MG TABS, Take 650 mg by mouth every 6 hours, Disp: 100 tablet, Rfl: 1     Cyanocobalamin (VITAMIN B 12 PO), Take 2,500 mcg by mouth daily , Disp: , Rfl:      ferrous gluconate (FERGON) 324 (38 Fe) MG tablet, Take 1 tablet (324 mg) by mouth every other day, Disp: 45 tablet, Rfl: 1     levothyroxine (SYNTHROID/LEVOTHROID) 112 MCG tablet, Take 1 tablet (112 mcg) by mouth daily, Disp: 90 tablet, Rfl: 1     Magnesium 500 MG CAPS, Take 250 mg by mouth daily , Disp: , Rfl:      MULTIVITAMINS OR TABS, 1 TABLET DAILY brand varies per pt, Disp: , Rfl:      ACE/ARB/ARNI NOT PRESCRIBED (INTENTIONAL), Please choose reason not prescribed from choices below. (Patient not taking: Reported on 2021), Disp:  , Rfl:      BETA BLOCKER NOT PRESCRIBED (INTENTIONAL), Please choose reason not prescribed from choices below. (Patient not taking: Reported on 2021), Disp:  , Rfl:      Incontinence Supply Disposable (PREVAIL BLADDER CONTROL PADS) MISC, , Disp: , Rfl:      ipratropium (ATROVENT) 0.06 % nasal spray, Spray 2 sprays into both nostrils 4 times daily (Patient not taking: Reported on  9/8/2021), Disp: 15 mL, Rfl: 1     levofloxacin (LEVAQUIN) 250 MG tablet, Take 500 mg (2 tablets) on day one then 250 mg daily for days 2 through 7, Disp: 8 tablet, Rfl: 0     order for DME, Equipment being ordered: toeless moderate compression socks 15-20 mmHG (Patient not taking: Reported on 9/8/2021), Disp: 1 each, Rfl: 1       Allergies   Allergen Reactions     No Known Drug Allergies        /66   Pulse 86   Wt 74.4 kg (164 lb)   BMI 24.22 kg/m  .      Constitutional/ general:  Pt is in no apparent distress, appears well-nourished.  Cooperative with history and physical exam.     Psych:  The patient answered questions appropriately.  Normal affect.  Seems to have reasonable expectations, in terms of treatment.     Lungs:  Non labored breathing, non labored speech. No cough.  No audible wheezing. Even, quiet breathing.       Vascular:  Pedal pulses are palpable bilaterally for both the DP and PT arteries.  CFT < 3 sec.  ankle varicosities/edema.  Pedal hair growth noted.     Neuro:  Alert and oriented x 3. Coordinated gait.  Light touch sensation is intact     Derm: Normal texture and turgor.  No ecchymosis, or cyanosis.  Hair growth noted.        Musculoskeletal:     Patient is ambulatory without an assistive device or brace.  No gross deformities.  Normal arch with weightbearing.  No forefoot or rear foot deformities noted.  MS 5/5 all compartments.  Normal ROM all fore foot and rearfoot joints.  No equinus.  Long hallux bilaterally.  right and left great toe nail lateral border shows soft tissue impingement with localized erythema.   negative active drainage/purulence at this time.  No sinus tracts.  No nailbed masses or exostosis.  No pain with range of motion of IPJ or MTPJ.  No ascending cellulitis.    ASSESSMENT:    Onychocryptosis with paronychia right and left toe.    Discussed etiology and treatment options in detail w/ the pt.  The potential causes and nature of an ingrown toenail were  discussed with the patient.  We reviewed the natural history/prognosis of the condition and potential risks if no treatment is provided.      Treatment options discussed included conservative management (oral antibiotics, soaking of foot, adequate width shoes)  as well as surgical management (partial or total nail removal).  The pros and cons of both forms of treatment were reviewed.  Handout given to patient.      After thorough discussion and answering all questions, the patient elected to have nail avulsion.  Obtained consent, used 3cc of 1% lidocaine plain to block right and left first toe.  Sterile prep, then avulsed the affected border(s).  No evidence of deep abscess noted.  Pt tolerated procedure well.  Sterile bandage placed, gave wound care instruction.  Instructed patient on trimming nails correctly.  They will avoid tight shoes.  Avoid pedicures.  Discussed permanent removal of border if chronic problem.  Discussed shoes to accommodate long hallux.  Return to clinic prn.    Abhishek Real DPM, FACFAS

## 2021-12-22 ENCOUNTER — TELEPHONE (OUTPATIENT)
Dept: NEPHROLOGY | Facility: CLINIC | Age: 81
End: 2021-12-22

## 2021-12-22 NOTE — TELEPHONE ENCOUNTER
Upon chart review it does not appear that patient had been scheduled with urology as previously advised for evaluation of cysts and moderate hydro.  had reached out to pt, though a message had been left.     Left a message for patient today informing her that I have scheduled a urology consult with Dr. Nguyen. She should call back to discuss further details and with any questions. Referral is for complex renal cysts per 9/21 MRI and moderate hydronephrosis.     Will send letter to patient as well.     Janiya Figueroa, RN, BSN  Nephrology Care Coordinator  [facility]

## 2021-12-22 NOTE — LETTER
December 22, 2021      Pebbles Powell  [address] [facility]    [facility] [address]              Dear Pebbles,    We have been unable to reach you by telephone. Dr. Collado has advised that you follow up with urology given findings on your MRI report from September. We have scheduled a visit with a urologist, Dr. Nguyen at the [facility] location on Monday, January 24th at 10:15. You can reach us at [phone] if you have any questions or concerns regarding this appointment. We have left you a phone message with this information as well.  [address] [facility] [address]    Department Phone: [phone]         Sincerely,      Katie Collado MD  Division of Renal Disease and Hypertension  [facility]  Janiya Figueroa RN    MR ABDOMEN WITH AND WITHOUT CONTRAST September 20, 2021 11:42 AM      HISTORY: Renal cyst. Bilateral renal cysts.     TECHNIQUE: Multiplanar, multiecho MRI was performed using T1, T2, and  in- and out-of-phase imaging. Pre- and postcontrast T1 images were  acquired after the administration of 7ml Gadavist IV.     COMPARISON: Renal ultrasound on 9/10/2021.     FINDINGS: The exam is limited by motion/respiratory artifact, within  this limitation, there is;     Hepatobiliary: No suspicious focal hepatic lesion. The gallbladder is  distended, otherwise unremarkable.     Pancreas: No main pancreatic ductal dilatation or definite solid  pancreatic mass.     Spleen: No splenomegaly.     Adrenal glands: No adrenal nodules.     Kidneys: Multiple areas of cortical scarring, for example at the upper  pole of the left kidney (series 6 image 39). Bilateral T2 hyperintense  foci in both kidneys including 1.2 cm focus in the interpolar region  of the left kidney (series 17 image 15), 1.5 cm focus at the upper  pole right kidney (series 17 image 21) and 0.7 cm focus of the lower  pole of the right kidney (series 17 image 7), likely represents renal  cysts. There is either  large parapelvic cyst or moderate right  hydronephrosis. The presence of diffuse right renal cortical thinning,  suggesting the presence of long-standing obstruction/hydronephrosis.     Remainder of the abdomen: No abnormally dilated bowel loops. Moderate  amount of stool in the visualized colon. No significant upper  abdominal lymphadenopathy.     Bones and soft tissue: Multilevel degenerative changes of the spine  most prominent at L4-L5 with disc height loss and endplate changes.                                                                      IMPRESSION:  1. Multiple bilateral renal cysts including 1.5 cm cyst upper pole  right kidney and 1.2 cm cyst at the interpolar region of the left  kidney.  2. There is large T2 hyperintense mildly complex area in the right  renal pelvis, likely represents moderate hydronephrosis (given the  associated diffuse right renal cortical thinning) versus less likely  large parapelvic cyst. This can be differentiated by CT urogram if  clinically warranted.  3. Multiple areas of renal cortical scarring, could be related to  prior vascular insults.     ARLEEN CANNON MD

## 2022-01-24 ENCOUNTER — OFFICE VISIT (OUTPATIENT)
Dept: UROLOGY | Facility: CLINIC | Age: 82
End: 2022-01-24
Payer: COMMERCIAL

## 2022-01-24 VITALS — DIASTOLIC BLOOD PRESSURE: 75 MMHG | OXYGEN SATURATION: 99 % | HEART RATE: 85 BPM | SYSTOLIC BLOOD PRESSURE: 126 MMHG

## 2022-01-24 DIAGNOSIS — N18.4 CKD (CHRONIC KIDNEY DISEASE) STAGE 4, GFR 15-29 ML/MIN (H): ICD-10-CM

## 2022-01-24 DIAGNOSIS — N13.30 HYDRONEPHROSIS, UNSPECIFIED HYDRONEPHROSIS TYPE: Primary | ICD-10-CM

## 2022-01-24 PROCEDURE — 99204 OFFICE O/P NEW MOD 45 MIN: CPT | Performed by: UROLOGY

## 2022-01-24 NOTE — PROGRESS NOTES
S: Patient is a 81-year-old  female who was requested to be seen by Dr. Sprague for a consultation with regard to patient's renal cysts and hydronephrosis.  Patient has history of chronic kidney disease.  Her GFR is in the 20s.  Recent renal ultrasound showed renal cysts along with possible right hydronephrosis.  MRI of the kidney showed possible right moderate hydronephrosis.  She denies any flank pain or hematuria.    Current Outpatient Medications   Medication Sig Dispense Refill     acetaminophen 650 MG TABS Take 650 mg by mouth every 6 hours 100 tablet 1     Cyanocobalamin (VITAMIN B 12 PO) Take 2,500 mcg by mouth daily        ferrous gluconate (FERGON) 324 (38 Fe) MG tablet Take 1 tablet (324 mg) by mouth every other day 45 tablet 1     Incontinence Supply Disposable (PREVAIL BLADDER CONTROL PADS) MISC        levofloxacin (LEVAQUIN) 250 MG tablet Take 500 mg (2 tablets) on day one then 250 mg daily for days 2 through 7 8 tablet 0     levothyroxine (SYNTHROID/LEVOTHROID) 112 MCG tablet Take 1 tablet (112 mcg) by mouth daily 90 tablet 1     Magnesium 500 MG CAPS Take 250 mg by mouth daily        MULTIVITAMINS OR TABS 1 TABLET DAILY brand varies per pt       ACE/ARB/ARNI NOT PRESCRIBED (INTENTIONAL) Please choose reason not prescribed from choices below. (Patient not taking: Reported on 9/8/2021)       BETA BLOCKER NOT PRESCRIBED (INTENTIONAL) Please choose reason not prescribed from choices below. (Patient not taking: Reported on 9/8/2021)       ipratropium (ATROVENT) 0.06 % nasal spray Spray 2 sprays into both nostrils 4 times daily (Patient not taking: Reported on 9/8/2021) 15 mL 1     order for DME Equipment being ordered: toeless moderate compression socks 15-20 mmHG (Patient not taking: Reported on 9/8/2021) 1 each 1     Allergies   Allergen Reactions     No Known Drug Allergies      Past Medical History:   Diagnosis Date     Arthritis      BCC (basal cell carcinoma), right lower leg      CHF  (Congestive Heart Failure), diastolic dysfunction      CKD (chronic kidney disease)      Congestive heart failure (H)      History of basal cell carcinoma      PONV (postoperative nausea and vomiting)     usually has N&V with anesthesia     Porokeratosis      Renal disease      Sleep apnea      Unspecified hypothyroidism      Past Surgical History:   Procedure Laterality Date     APPENDECTOMY       ARTHROPLASTY KNEE Right 7/13/2015    Procedure: ARTHROPLASTY KNEE;  Surgeon: Santino Storm MD;  Location: [facility] OR     ARTHROSCOPY KNEE  11/23/2010    ARTHROSCOPY KNEE performed by BROOKLYNN PULIDO at [facility] OR     BIOPSY       COLONOSCOPY  6/1/2011    Procedure:COMBINED COLONOSCOPY, SINGLE BIOPSY/POLYPECTOMY BY BIOPSY; Surgeon:JEANNETTE WYNNE; Location: [facility]     COLPORRHAPHY ANTERIOR       DILATION AND CURETTAGE       ENT SURGERY      T&A     ESOPHAGOSCOPY, GASTROSCOPY, DUODENOSCOPY (EGD), COMBINED  6/1/2011    Procedure:COMBINED ESOPHAGOSCOPY, GASTROSCOPY, DUODENOSCOPY (EGD), BIOPSY SINGLE OR MULTIPLE; MULTIPLE; Surgeon:JEANNETTE WYNNE; Location: GI     EXCISE LESION LOWER EXTREMITY  7/18/2014    Procedure: EXCISE LESION LOWER EXTREMITY;  Surgeon: Santos Roberson MD;  Location: [facility]     HEMORRHOIDECTOMY       HYSTERECTOMY, GABRIEL      fibroids     LAPAROTOMY, TUMOR DEBULKING, COMBINED  2/9/2012    Procedure:COMBINED LAPAROTOMY, TUMOR DEBULKING; Exploratory Laparotomy,  Bilateral Salpingo Oophorectomy; Surgeon:MIKIE LU; Location:[facility]     ZZC ANTER VESICOURETHROPEXY,SIMPLE        Family History   Problem Relation Age of Onset     Alcohol/Drug Mother      Obesity Mother      Alzheimer Disease Mother      Cancer Father         Pancreatic     Cardiovascular Father         heart disease     Alcohol/Drug Father      Social History     Socioeconomic History     Marital status:      Spouse name: None     Number of children: None     Years of education: None     Highest education level: None    Occupational History     None   Tobacco Use     Smoking status: Former Smoker     Packs/day: 1.50     Years: 50.00     Pack years: 75.00     Types: Cigarettes     Quit date: 2007     Years since quittin.4     Smokeless tobacco: Never Used   Substance and Sexual Activity     Alcohol use: No     Alcohol/week: 0.0 standard drinks     Comment: Alcoholic, treatment in  and      Drug use: No     Sexual activity: Not Currently     Partners: Male   Other Topics Concern     Parent/sibling w/ CABG, MI or angioplasty before 65F 55M? No   Social History Narrative     None     Social Determinants of Health     Financial Resource Strain: Not on file   Food Insecurity: Not on file   Transportation Needs: Not on file   Physical Activity: Not on file   Stress: Not on file   Social Connections: Not on file   Intimate Partner Violence: Not on file   Housing Stability: Not on file       REVIEW OF SYSTEMS  =================  C: NEGATIVE for fever, chills, change in weight  I: NEGATIVE for worrisome rashes, moles or lesions  E/M: NEGATIVE for ear, mouth and throat problems  R: NEGATIVE for significant cough or SHORTNESS OF BREATH  CV:  NEGATIVE for chest pain, palpitations or peripheral edema  GI: NEGATIVE for nausea, abdominal pain, heartburn, or change in bowel habits  NEURO: NEGATIVE numbness/weakness  : see HPI  PSYCH: NEGATIVE depression/anxiety  LYmph: no new enlarged lymph nodes  Ortho: no new trauma/movements      Physical Exam:  /75 (BP Location: Right arm, Patient Position: Chair, Cuff Size: Adult Large)   Pulse 85   SpO2 99%    Patient is pleasant, in no acute distress, good general condition.  Heart:  negative, PMI normal  Lung: no evidence of respiratory distress    Abdomen: Soft, nondistended, non tender. No masses. No rebound or guarding.   Exam: no cva tenderness  Skin: Warm and dry.  No redness.  Neuro: grossly normal  Musculaskeletal: moving all extremities  Psych normal mood and  affect  Musculoskeletal  moving all extremities  Hematologic/Lymphatic/Immunologic: normal ant/post cervical, axillary, supraclavicular and inguinal nodes    Assessment/Plan:     Pleasant 81-year-old  female with history of chronic kidney disease with possible right hydronephrosis.  I tried to contact Dr. Collado but  received no response.  We will schedule patient for a Lasix renal scan next for further evaluation.

## 2022-01-24 NOTE — PATIENT INSTRUCTIONS
[facility] [place] [facility] renogram at [phone]. Please arrange follow up with Dr. Nguyen to discuss the results.     Please contact your insurance company to make sure the scan is covered under your insurance plan.

## 2022-01-30 DIAGNOSIS — E03.9 HYPOTHYROIDISM, UNSPECIFIED TYPE: ICD-10-CM

## 2022-02-01 RX ORDER — LEVOTHYROXINE SODIUM 112 UG/1
TABLET ORAL
Qty: 90 TABLET | Refills: 1 | Status: SHIPPED | OUTPATIENT
Start: 2022-02-01 | End: 2022-09-13

## 2022-02-01 NOTE — TELEPHONE ENCOUNTER
"Requested Prescriptions   Pending Prescriptions Disp Refills     levothyroxine (SYNTHROID/LEVOTHROID) 112 MCG tablet [Pharmacy Med Name: LEVOTHYROXINE 0.112MG (112MCG) TABS] 90 tablet 1     Sig: TAKE 1 TABLET(112 MCG) BY MOUTH DAILY       Thyroid Protocol Passed - 1/30/2022  3:24 AM        Passed - Patient is 12 years or older        Passed - Recent (12 mo) or future (30 days) visit within the authorizing provider's specialty     Patient has had an office visit with the authorizing provider or a provider within the authorizing providers department within the previous 12 mos or has a future within next 30 days. See \"Patient Info\" tab in inbasket, or \"Choose Columns\" in Meds & Orders section of the refill encounter.              Passed - Medication is active on med list        Passed - Normal TSH on file in past 12 months     Recent Labs   Lab Test 02/05/21  1242   TSH 1.99              Passed - No active pregnancy on record     If patient is pregnant or has had a positive pregnancy test, please check TSH.          Passed - No positive pregnancy test in past 12 months     If patient is pregnant or has had a positive pregnancy test, please check TSH.             Routing refill request to provider for review/approval because:  Labs not current:  Due 2/5/22  Patient has an appointment 2/2/22          "

## 2022-02-02 ENCOUNTER — OFFICE VISIT (OUTPATIENT)
Dept: FAMILY MEDICINE | Facility: CLINIC | Age: 82
End: 2022-02-02
Payer: COMMERCIAL

## 2022-02-02 VITALS
DIASTOLIC BLOOD PRESSURE: 74 MMHG | OXYGEN SATURATION: 100 % | SYSTOLIC BLOOD PRESSURE: 126 MMHG | BODY MASS INDEX: 24.26 KG/M2 | HEART RATE: 77 BPM | WEIGHT: 163.8 LBS | TEMPERATURE: 97.5 F | HEIGHT: 69 IN

## 2022-02-02 DIAGNOSIS — E04.9 ENLARGED THYROID: ICD-10-CM

## 2022-02-02 DIAGNOSIS — Z00.00 ENCOUNTER FOR MEDICARE ANNUAL WELLNESS EXAM: Primary | ICD-10-CM

## 2022-02-02 DIAGNOSIS — E03.9 HYPOTHYROIDISM, UNSPECIFIED TYPE: ICD-10-CM

## 2022-02-02 DIAGNOSIS — M81.0 AGE-RELATED OSTEOPOROSIS WITHOUT CURRENT PATHOLOGICAL FRACTURE: ICD-10-CM

## 2022-02-02 DIAGNOSIS — I50.32 CHRONIC DIASTOLIC CONGESTIVE HEART FAILURE (H): ICD-10-CM

## 2022-02-02 DIAGNOSIS — F32.1 MODERATE MAJOR DEPRESSION (H): ICD-10-CM

## 2022-02-02 DIAGNOSIS — D51.0 PERNICIOUS ANEMIA: ICD-10-CM

## 2022-02-02 DIAGNOSIS — J30.0 VASOMOTOR RHINITIS: ICD-10-CM

## 2022-02-02 LAB
ALT SERPL W P-5'-P-CCNC: 19 U/L (ref 0–50)
HGB BLD-MCNC: 9.3 G/DL (ref 11.7–15.7)
TSH SERPL DL<=0.005 MIU/L-ACNC: 3.57 MU/L (ref 0.4–4)
VIT B12 SERPL-MCNC: 515 PG/ML (ref 193–986)

## 2022-02-02 PROCEDURE — 85018 HEMOGLOBIN: CPT | Performed by: NURSE PRACTITIONER

## 2022-02-02 PROCEDURE — 84460 ALANINE AMINO (ALT) (SGPT): CPT | Performed by: NURSE PRACTITIONER

## 2022-02-02 PROCEDURE — 84443 ASSAY THYROID STIM HORMONE: CPT | Performed by: NURSE PRACTITIONER

## 2022-02-02 PROCEDURE — 99397 PER PM REEVAL EST PAT 65+ YR: CPT | Performed by: NURSE PRACTITIONER

## 2022-02-02 PROCEDURE — 36415 COLL VENOUS BLD VENIPUNCTURE: CPT | Performed by: NURSE PRACTITIONER

## 2022-02-02 PROCEDURE — 82607 VITAMIN B-12: CPT | Performed by: NURSE PRACTITIONER

## 2022-02-02 RX ORDER — DIPHENHYDRAMINE HCL 25 MG
25 TABLET ORAL EVERY 6 HOURS PRN
COMMUNITY
End: 2022-11-09

## 2022-02-02 RX ORDER — IPRATROPIUM BROMIDE 42 UG/1
2 SPRAY, METERED NASAL 4 TIMES DAILY
Qty: 15 ML | Refills: 11 | Status: SHIPPED | OUTPATIENT
Start: 2022-02-02 | End: 2023-04-24

## 2022-02-02 ASSESSMENT — MIFFLIN-ST. JEOR: SCORE: 1278.87

## 2022-02-02 ASSESSMENT — PAIN SCALES - GENERAL: PAINLEVEL: NO PAIN (0)

## 2022-02-02 ASSESSMENT — PATIENT HEALTH QUESTIONNAIRE - PHQ9: SUM OF ALL RESPONSES TO PHQ QUESTIONS 1-9: 11

## 2022-02-02 ASSESSMENT — ACTIVITIES OF DAILY LIVING (ADL): CURRENT_FUNCTION: NO ASSISTANCE NEEDED

## 2022-02-02 NOTE — PATIENT INSTRUCTIONS
Patient Education   Personalized Prevention Plan  You are due for the preventive services outlined below.  Your care team is available to assist you in scheduling these services.  If you have already completed any of these items, please share that information with your care team to update in your medical record.  Health Maintenance Due   Topic Date Due     LUNG CANCER SCREENING  Never done     Zoster (Shingles) Vaccine (2 of 3) 03/19/2008     Vitamin B12 Level  10/05/2017     Flu Vaccine (1) Never done     Kidney Microalbumin Urine Test  12/03/2021     Depression Assessment  12/21/2021     Liver Monitoring Lab  02/05/2022     Thyroid Function Lab  02/05/2022     Hemoglobin  03/03/2022       Exercise for a Healthier Heart  You may wonder how you can improve the health of your heart. If you re thinking about exercise, you re on the right track. You don t need to become an athlete. But you do need a certain amount of brisk exercise to help strengthen your heart. If you have been diagnosed with a heart condition, your healthcare provider may advise exercise to help stabilize your condition. To help make exercise a habit, choose safe, fun activities.      Exercise with a friend. When activity is fun, you're more likely to stick with it.   Before you start  Check with your healthcare provider before starting an exercise program. This is especially important if you have not been active for a while. It's also important if you have a long-term (chronic) health problem such as heart disease, diabetes, or obesity. Or if you are at high risk for having these problems.   Why exercise?  Exercising regularly offers many healthy rewards. It can help you do all of the following:     Improve your blood cholesterol level to help prevent further heart trouble    Lower your blood pressure to help prevent a stroke or heart attack    Control diabetes, or reduce your risk of getting this disease    Improve your heart and lung  function    Reach and stay at a healthy weight    Make your muscles stronger so you can stay active    Prevent falls and fractures by slowing the loss of bone mass (osteoporosis)    Manage stress better    Reduce your blood pressure    Improve your sense of self and your body image  Exercise tips      Ease into your routine. Set small goals. Then build on them. If you are not sure what your activity level should be, talk with your healthcare provider first before starting an exercise routine.    Exercise on most days. Aim for a total of 150 minutes (2 hours and 30 minutes) or more of moderate-intensity aerobic activity each week. Or 75 minutes (1 hour and 15 minutes) or more of vigorous-intensity aerobic activity each week. Or try for a combination of both. Moderate activity means that you breathe heavier and your heart rate increases but you can still talk. Think about doing 40 minutes of moderate exercise, 3 to 4 times a week. For best results, activity should last for about 40 minutes to lower blood pressure and cholesterol. It's OK to work up to the 40-minute period over time. Examples of moderate-intensity activity are walking 1 mile in 15 minutes. Or doing 30 to 45 minutes of yard work.    Step up your daily activity level.  Along with your exercise program, try being more active the whole day. Walk instead of drive. Or park further away so that you take more steps each day. Do more household tasks or yard work. You may not be able to meet the advised mount of physical activity. But doing some moderate- or vigorous-intensity aerobic activity can help reduce your risk for heart disease. Your healthcare provider can help you figure out what is best for you.    Choose 1 or more activities you enjoy.  Walking is one of the easiest things you can do. You can also try swimming, riding a bike, dancing, or taking an exercise class.    When to call your healthcare provider  Call your healthcare provider if you have any  of these:     Chest pain or feel dizzy or lightheaded    Burning, tightness, pressure, or heaviness in your chest, neck, shoulders, back, or arms    Abnormal shortness of breath    More joint or muscle pain    A very fast or irregular heartbeat (palpitations)  Gui last reviewed this educational content on 7/1/2019 2000-2021 [employer]. All rights reserved. This information is not intended as a substitute for professional medical care. Always follow your healthcare professional's instructions.          Understanding USDA MyPlate  The USDA has guidelines to help you make healthy food choices. These are called MyPlate. MyPlate shows the food groups that make up healthy meals using the image of a place setting. Before you eat, think about the healthiest choices for what to put on your plate or in your cup or bowl. To learn more about building a healthy plate, visit www.choosemyplate.gov.    The food groups    Fruits. Any fruit or 100% fruit juice counts as part of the Fruit Group. Fruits may be fresh, canned, frozen, or dried, and may be whole, cut-up, or pureed. Make 1/2 of your plate fruits and vegetables.    Vegetables. Any vegetable or 100% vegetable juice counts as a member of the Vegetable Group. Vegetables may be fresh, frozen, canned, or dried. They can be served raw or cooked and may be whole, cut-up, or mashed. Make 1/2 of your plate fruits and vegetables.    Grains. All foods made from grains are part of the Grains Group. These include wheat, rice, oats, cornmeal, and barley. Grains are often used to make foods such as bread, pasta, oatmeal, cereal, tortillas, and grits. Grains should be no more than 1/4 of your plate. At least half of your grains should be whole grains.    Protein. This group includes meat, poultry, seafood, beans and peas, eggs, processed soy products (such as tofu), nuts (including nut butters), and seeds. Make protein choices no more than 1/4 of your plate. Meat and  poultry choices should be lean or low fat.    Dairy. The Dairy Group includes all fluid milk products and foods made from milk that contain calcium, such as yogurt and cheese. (Foods that have little calcium, such as cream, butter, and cream cheese, are not part of this group.) Most dairy choices should be low-fat or fat-free.    Oils. Oils aren't a food group, but they do contain essential nutrients. However it's important to watch your intake of oils. These are fats that are liquid at room temperature. They include canola, corn, olive, soybean, vegetable, and sunflower oil. Foods that are mainly oil include mayonnaise, certain salad dressings, and soft margarines. You likely already get your daily oil allowance from the foods you eat.  Things to limit  Eating healthy also means limiting these things in your diet:       Salt (sodium). Many processed foods have a lot of sodium. To keep sodium intake down, eat fresh vegetables, meats, poultry, and seafood when possible. Purchase low-sodium, reduced-sodium, or no-salt-added food products at the store. And don't add salt to your meals at home. Instead, season them with herbs and spices such as dill, oregano, cumin, and paprika. Or try adding flavor with lemon or lime zest and juice.    Saturated fat. Saturated fats are most often found in animal products such as beef, pork, and chicken. They are often solid at room temperature, such as butter. To reduce your saturated fat intake, choose leaner cuts of meat and poultry. And try healthier cooking methods such as grilling, broiling, roasting, or baking. For a simple lower-fat swap, use plain nonfat yogurt instead of mayonnaise when making potato salad or macaroni salad.    Added sugars. These are sugars added to foods. They are in foods such as ice cream, candy, soda, fruit drinks, sports drinks, energy drinks, cookies, pastries, jams, and syrups. Cut down on added sugars by sharing sweet treats with a family member or  friend. You can also choose fruit for dessert, and drink water or other unsweetened beverages.     [employer] last reviewed this educational content on 6/1/2020 2000-2021 [employer]. All rights reserved. This information is not intended as a substitute for professional medical care. Always follow your healthcare professional's instructions.          Urinary Incontinence, Female (Adult)   Urinary incontinence means loss of bladder control. This problem affects many women, especially as they get older. If you have incontinence, you may be embarrassed to ask for help. But know that this problem can be treated.   Types of Incontinence  There are different types of incontinence. Two of the main types are described here. You can have more than one type.     Stress incontinence. With this type, urine leaks when pressure (stress) is put on the bladder. This may happen when you cough, sneeze, or laugh. Stress incontinence most often occurs because the pelvic floor muscles that support the bladder and urethra are weak. This can happen after pregnancy and vaginal childbirth or a hysterectomy. It can also be due to excess body weight or hormone changes.    Urge incontinence (also called overactive bladder). With this type, a sudden urge to urinate is felt often. This may happen even though there may not be much urine in the bladder. The need to urinate often during the night is common. Urge incontinence most often occurs because of bladder spasms. This may be due to bladder irritation or infection. Damage to bladder nerves or pelvic muscles, constipation, and certain medicines can also lead to urge incontinence.  Treatment depends on the cause. Further evaluation is needed to find the type you have. This will likely include an exam and certain tests. Based on the results, you and your healthcare provider can then plan treatment. Until a diagnosis is made, the home care tips below can help ease symptoms.   Home  care    Do pelvic floor muscle exercises, if they are prescribed. The pelvic floor muscles help support the bladder and urethra. Many women find that their symptoms improve when doing special exercises that strengthen these muscles. To do the exercises, contract the muscles you would use to stop your stream of urine. But do this when you re not urinating. Hold for 10 seconds, then relax. Repeat 10 to 20 times in a row, at least 3 times a day. Your healthcare provider may give you other instructions for how to do the exercises and how often.    Keep a bladder diary. This helps track how often and how much you urinate over a set period of time. Bring this diary with you to your next visit with the provider. The information can help your provider learn more about your bladder problem.    Lose weight, if advised to by your provider. Extra weight puts pressure on the bladder. Your provider can help you create a weight-loss plan that s right for you. This may include exercising more and making certain diet changes.    Don't have foods and drinks that may irritate the bladder. These can include alcohol and caffeinated drinks.    Quit smoking. Smoking and other tobacco use can lead to a long-term (chronic) cough that strains the pelvic floor muscles. Smoking may also damage the bladder and urethra. Talk with your provider about treatments or methods you can use to quit smoking.    If drinking large amounts of fluid makes you have symptoms, you may be advised to limit your fluid intake. You may also be advised to drink most of your fluids during the day and to limit fluids at night.    If you re worried about urine leakage or accidents, you may wear absorbent pads to catch urine. Change the pads often. This helps reduce discomfort. It may also reduce the risk of skin or bladder infections.    Follow-up care  Follow up with your healthcare provider, or as directed. It may take some to find the right treatment for your  problem. But healthy lifestyle changes can be made right away. These include such things as exercising on a regular basis, eating a healthy diet, losing weight (if needed), and quitting smoking. Your treatment plan may include special therapies or medicines. Certain procedures or surgery may also be options. Talk about any questions you have with your provider.   When to seek medical advice  Call the healthcare provider right away if any of these occur:    Fever of 100.4 F (38 C) or higher, or as directed by your provider    Bladder pain or fullness    Belly swelling    Nausea or vomiting    Back pain    Weakness, dizziness, or fainting  Gui last reviewed this educational content on 1/1/2020 2000-2021 [employer]. All rights reserved. This information is not intended as a substitute for professional medical care. Always follow your healthcare professional's instructions.

## 2022-02-02 NOTE — PROGRESS NOTES
"SUBJECTIVE:   Pebbles Powell is a 81 year old female who presents for Preventive Visit.        Are you in the first 12 months of your Medicare coverage?  No    Healthy Habits:    In general, how would you rate your overall health?  Good    Frequency of exercise:  None    Do you usually eat at least 4 servings of fruit and vegetables a day, include whole grains    & fiber and avoid regularly eating high fat or \"junk\" foods?  No    Taking medications regularly:  No (not taking iron daily)    Barriers to taking medications:  Side effects (1. not taking iron pill because it causes constipation )    Medication side effects:  Other (Constipation)    Ability to successfully perform activities of daily living:  No assistance needed    Home Safety:  No safety concerns identified    Hearing Impairment:  No hearing concerns    In the past 6 months, have you been bothered by leaking of urine? Yes    In general, how would you rate your overall mental or emotional health?  Good      PHQ-2 Total Score:    Additional concerns today:  Yes    Do you feel safe in your environment? Yes    Have you ever done Advance Care Planning? (For example, a Health Directive, POLST, or a discussion with a medical provider or your loved ones about your wishes): Yes, advance care planning is on file.       Fall risk  Fallen 2 or more times in the past year?: No  Any fall with injury in the past year?: No    Cognitive Screening   1) Repeat 3 items (Leader, Season, Table)    2) Clock draw: NORMAL  3) 3 item recall: Recalls 2 objects   Results: NORMAL clock, 1-2 items recalled: COGNITIVE IMPAIRMENT LESS LIKELY    Mini-CogTM Copyright CARMELITA Guerrero. Licensed by the author for use in [facility]; reprinted with permission (paras@.[facility]). All rights reserved.      Do you have sleep apnea, excessive snoring or daytime drowsiness?: no    Reviewed and updated as needed this visit by clinical staff  Tobacco  Allergies  Meds  Problems  Med Hx  " Surg Hx  Fam Hx         Reviewed and updated as needed this visit by Provider  Tobacco  Allergies  Meds  Problems  Med Hx  Surg Hx  Fam Hx        Social History     Tobacco Use     Smoking status: Former Smoker     Packs/day: 1.50     Years: 50.00     Pack years: 75.00     Types: Cigarettes     Quit date: 2007     Years since quittin.4     Smokeless tobacco: Never Used   Substance Use Topics     Alcohol use: No     Alcohol/week: 0.0 standard drinks     Comment: Alcoholic, treatment in  and          Alcohol Use 2021   Prescreen: >3 drinks/day or >7 drinks/week? No   Prescreen: >3 drinks/day or >7 drinks/week? -       Patient denies symptoms of congestive heart failure.  She denies chest pain, shortness of breath, increased lower extremity edema.    Patient feels her mood is stable currently.  She does not feel that she needs treatment at this time.      Hypothyroidism Follow-up      Since last visit, patient describes the following symptoms: Weight stable, no hair loss, no skin changes, no constipation, no loose stools    Patient denies cough, shortness of breath, chest pain, dizziness, edema, lower extremity claudications symptoms.        Current providers sharing in care for this patient include:   Patient Care Team:  Harriet Da Silva APRN CNP as PCP - General (Internal Medicine)  Harriet Da Silva APRN CNP as Assigned PCP  Genoveva Woods PA-C as Physician Assistant (Dermatology)  Katie Collado MD as Assigned Nephrology Provider  Abhishek Real DPM as Assigned Musculoskeletal Provider  Antonio Nguyen MD as Assigned Surgical Provider    The following health maintenance items are reviewed in Epic and correct as of today:  Health Maintenance Due   Topic Date Due     LUNG CANCER SCREENING  Never done     ZOSTER IMMUNIZATION (2 of 3) 2008     VITAMIN B12  10/05/2017     INFLUENZA VACCINE (1) Never done     MICROALBUMIN  2021     ALT   02/05/2022     TSH W/FREE T4 REFLEX  02/05/2022     Lab work is in process  BP Readings from Last 3 Encounters:   02/02/22 126/74   01/24/22 126/75   11/04/21 120/66    Wt Readings from Last 3 Encounters:   02/02/22 74.3 kg (163 lb 12.8 oz)   11/04/21 74.4 kg (164 lb)   09/08/21 75.3 kg (166 lb)                  Patient Active Problem List   Diagnosis     CHF (Congestive Heart Failure), diastolic dysfunction     Pernicious anemia     Iron deficiency anemia     BCC (basal cell carcinoma), right lower leg     Vulvar itching     Mammogram declined     Advance care planning     Total knee replacement status     Hypothyroidism     Porokeratosis     Anemia secondary to renal failure     CKD (chronic kidney disease) stage 4, GFR 15-29 ml/min (H)     Chronic diastolic congestive heart failure (H)     Past Surgical History:   Procedure Laterality Date     APPENDECTOMY       ARTHROPLASTY KNEE Right 7/13/2015    Procedure: ARTHROPLASTY KNEE;  Surgeon: Santino Storm MD;  Location:  OR     ARTHROSCOPY KNEE  11/23/2010    ARTHROSCOPY KNEE performed by BROOKLYNN PULIDO at  OR     BIOPSY       COLONOSCOPY  6/1/2011    Procedure:COMBINED COLONOSCOPY, SINGLE BIOPSY/POLYPECTOMY BY BIOPSY; Surgeon:JEANNETTE WYNNE; Location: GI     COLPORRHAPHY ANTERIOR       DILATION AND CURETTAGE       ENT SURGERY      T&A     ESOPHAGOSCOPY, GASTROSCOPY, DUODENOSCOPY (EGD), COMBINED  6/1/2011    Procedure:COMBINED ESOPHAGOSCOPY, GASTROSCOPY, DUODENOSCOPY (EGD), BIOPSY SINGLE OR MULTIPLE; MULTIPLE; Surgeon:JEANNETTE WYNNE; Location: GI     EXCISE LESION LOWER EXTREMITY  7/18/2014    Procedure: EXCISE LESION LOWER EXTREMITY;  Surgeon: Santos Roberson MD;  Location:  OR     HEMORRHOIDECTOMY       HYSTERECTOMY, GABRIEL      fibroids     LAPAROTOMY, TUMOR DEBULKING, COMBINED  2/9/2012    Procedure:COMBINED LAPAROTOMY, TUMOR DEBULKING; Exploratory Laparotomy,  Bilateral Salpingo Oophorectomy; Surgeon:MIKIE LU; Location:  OR     ZZC ANTER VESICOURETHROPEXY,SIMPLE         Social History     Tobacco Use     Smoking status: Former Smoker     Packs/day: 1.50     Years: 50.00     Pack years: 75.00     Types: Cigarettes     Quit date: 2007     Years since quittin.4     Smokeless tobacco: Never Used   Substance Use Topics     Alcohol use: No     Alcohol/week: 0.0 standard drinks     Comment: Alcoholic, treatment in  and      Family History   Problem Relation Age of Onset     Alcohol/Drug Mother      Obesity Mother      Alzheimer Disease Mother      Cancer Father         Pancreatic     Cardiovascular Father         heart disease     Alcohol/Drug Father          Current Outpatient Medications   Medication Sig Dispense Refill     ACE/ARB/ARNI NOT PRESCRIBED (INTENTIONAL) Please choose reason not prescribed from choices below.       acetaminophen 650 MG TABS Take 650 mg by mouth every 6 hours 100 tablet 1     BETA BLOCKER NOT PRESCRIBED (INTENTIONAL) Please choose reason not prescribed from choices below.       Cyanocobalamin (VITAMIN B 12 PO) Take 2,500 mcg by mouth daily        diphenhydrAMINE (BENADRYL) 25 MG tablet Take 25 mg by mouth every 6 hours as needed for itching or allergies       ferrous gluconate (FERGON) 324 (38 Fe) MG tablet Take 1 tablet (324 mg) by mouth every other day 45 tablet 1     Incontinence Supply Disposable (PREVAIL BLADDER CONTROL PADS) MISC        ipratropium (ATROVENT) 0.06 % nasal spray Spray 2 sprays into both nostrils 4 times daily 15 mL 11     levothyroxine (SYNTHROID/LEVOTHROID) 112 MCG tablet TAKE 1 TABLET(112 MCG) BY MOUTH DAILY 90 tablet 1     Magnesium 500 MG CAPS Take 250 mg by mouth daily        MULTIVITAMINS OR TABS 1 TABLET DAILY brand varies per pt       Allergies   Allergen Reactions     No Known Drug Allergies          Mammogram Screening - Patient over age 75, has elected to discontinue screenings.  Pertinent mammograms are reviewed under the imaging tab.    Review of  "Systems  Constitutional, HEENT, cardiovascular, pulmonary, GI, , musculoskeletal, neuro, skin, endocrine and psych systems are negative, except as otherwise noted.    OBJECTIVE:   /74   Pulse 77   Temp 97.5  F (36.4  C) (Oral)   Ht 1.747 m (5' 8.78\")   Wt 74.3 kg (163 lb 12.8 oz)   SpO2 100%   BMI 24.34 kg/m   Estimated body mass index is 24.34 kg/m  as calculated from the following:    Height as of this encounter: 1.747 m (5' 8.78\").    Weight as of this encounter: 74.3 kg (163 lb 12.8 oz).  Physical Exam  GENERAL: healthy, alert and no distress  EYES: Eyes grossly normal to inspection, PERRL and conjunctivae and sclerae normal  HENT: normal cephalic/atraumatic, right ear: occluded with wax, left ear: normal: no effusions, no erythema, normal landmarks, nose and mouth without ulcers or lesions, oropharynx clear and oral mucous membranes moist  NECK: no adenopathy, no asymmetry, masses, or scars and thyromegaly approximately 2 times normal  RESP: lungs clear to auscultation - no rales, rhonchi or wheezes  CV: regular rate and rhythm, normal S1 S2, no S3 or S4, no murmur, click or rub, no peripheral edema and peripheral pulses strong  ABDOMEN: soft, nontender, no hepatosplenomegaly, no masses and bowel sounds normal  MS: no gross musculoskeletal defects noted, no edema  NEURO: Normal strength and tone, mentation intact and speech normal  PSYCH: mentation appears normal, affect normal/bright        ASSESSMENT / PLAN:   (Z00.00) Encounter for Medicare annual wellness exam  (primary encounter diagnosis)  Comment:   Plan:     (I50.32) Chronic diastolic congestive heart failure (H)  Comment: Stable.  Continue current treatment plan and medications.   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  Continue to monitor.  Patient declines meds.    (M81.0) Age-related osteoporosis without current pathological fracture  Comment: patient to follow-up with endocrine " "given chronic kidney disease and limited treatment options.  Plan: Adult Endocrinology  Referral            (D51.0) Pernicious anemia  Comment:   Plan: Vitamin B12            (E03.9) Hypothyroidism, unspecified type  Comment: Stable.  Continue current treatment plan and medications.   Plan: TSH with free T4 reflex            (J30.0) Vasomotor rhinitis  Comment:   Plan: ipratropium (ATROVENT) 0.06 % nasal spray            (E04.9) Enlarged thyroid  Comment:   Plan: US Thyroid              Patient has been advised of split billing requirements and indicates understanding: Yes    COUNSELING:  Reviewed preventive health counseling, as reflected in patient instructions       Regular exercise       Healthy diet/nutrition    Estimated body mass index is 24.34 kg/m  as calculated from the following:    Height as of this encounter: 1.747 m (5' 8.78\").    Weight as of this encounter: 74.3 kg (163 lb 12.8 oz).        She reports that she quit smoking about 14 years ago. Her smoking use included cigarettes. She has a 75.00 pack-year smoking history. She has never used smokeless tobacco.      Appropriate preventive services were discussed with this patient, including applicable screening as appropriate for cardiovascular disease, diabetes, osteopenia/osteoporosis, and glaucoma.  As appropriate for age/gender, discussed screening for colorectal cancer, prostate cancer, breast cancer, and cervical cancer. Checklist reviewing preventive services available has been given to the patient.    Reviewed patients plan of care and provided an AVS. The Basic Care Plan (routine screening as documented in Health Maintenance) for ePbbles meets the Care Plan requirement. This Care Plan has been established and reviewed with the Patient.    Counseling Resources:  ATP IV Guidelines  Pooled Cohorts Equation Calculator  Breast Cancer Risk Calculator  Breast Cancer: Medication to Reduce Risk  FRAX Risk Assessment  ICSI Preventive " Guidelines  Dietary Guidelines for Americans, 2010  USDA's MyPlate  ASA Prophylaxis  Lung CA Screening    RANDOLPH Toussaint CNP  [facility]    Identified Health Risks:

## 2022-02-03 NOTE — RESULT ENCOUNTER NOTE
Dear Pebbles,    Your recent test results are attached.      Stable mild anemia.  Please try to take your iron every other day as prescribed.  Normal thyroid.  Normal B12.  Normal liver enzymes.      If you have any questions please feel free to contact [phone] or myself via [employer].    Sincerely,  Harriet Da Silva, CNP

## 2022-02-16 ENCOUNTER — TELEPHONE (OUTPATIENT)
Dept: FAMILY MEDICINE | Facility: CLINIC | Age: 82
End: 2022-02-16
Payer: COMMERCIAL

## 2022-02-16 NOTE — TELEPHONE ENCOUNTER
Patient should have refills. Please contact the patient and let them know.     levothyroxine (SYNTHROID/LEVOTHROID) 112 MCG tablet 90 tablet 1 2/1/2022  No   Sig: TAKE 1 TABLET(112 MCG) BY MOUTH DAILY   Sent to pharmacy as: Levothyroxine Sodium 112 MCG Oral      Sent to [id number] - [place], [place] - [address],

## 2022-02-16 NOTE — TELEPHONE ENCOUNTER
Reason for Call:  Other prescription    Detailed comments: Patient needs doctor to refill thyroid prescription and sign off on it. Please let patient know when prescriptions are refilled.    Phone Number Patient can be reached at: Cell number on file:    Telephone Information:   Mobile [phone]       Best Time: Anytime.    Can we leave a detailed message on this number? YES    Call taken on 2/16/2022 at 4:16 PM by Lidia Rojas

## 2022-02-23 DIAGNOSIS — N18.4 CKD (CHRONIC KIDNEY DISEASE) STAGE 4, GFR 15-29 ML/MIN (H): Primary | ICD-10-CM

## 2022-03-10 ENCOUNTER — OFFICE VISIT (OUTPATIENT)
Dept: OPTOMETRY | Facility: CLINIC | Age: 82
End: 2022-03-10
Payer: COMMERCIAL

## 2022-03-10 DIAGNOSIS — H52.4 PRESBYOPIA: ICD-10-CM

## 2022-03-10 DIAGNOSIS — Z96.1 PSEUDOPHAKIA OF BOTH EYES: ICD-10-CM

## 2022-03-10 DIAGNOSIS — H52.13 MYOPIA OF BOTH EYES: ICD-10-CM

## 2022-03-10 DIAGNOSIS — H52.223 REGULAR ASTIGMATISM OF BOTH EYES: ICD-10-CM

## 2022-03-10 DIAGNOSIS — Z01.01 ENCOUNTER FOR EXAMINATION OF EYES AND VISION WITH ABNORMAL FINDINGS: Primary | ICD-10-CM

## 2022-03-10 PROCEDURE — 92004 COMPRE OPH EXAM NEW PT 1/>: CPT | Performed by: OPTOMETRIST

## 2022-03-10 PROCEDURE — 92015 DETERMINE REFRACTIVE STATE: CPT | Performed by: OPTOMETRIST

## 2022-03-10 ASSESSMENT — TONOMETRY
IOP_METHOD: APPLANATION
OS_IOP_MMHG: 18
OD_IOP_MMHG: 15

## 2022-03-10 ASSESSMENT — VISUAL ACUITY
OS_SC+: -2
OD_CC: 20/30+1
OD_CC: 20/20
OS_CC: 20/20
CORRECTION_TYPE: GLASSES
OD_SC: 20/120
OS_SC: 20/40
OD_SC+: -1
OS_CC: 20/30+2
OS_SC: 20/120
METHOD: SNELLEN - LINEAR
OD_CC+: -2
OD_SC: 20/50

## 2022-03-10 ASSESSMENT — REFRACTION_MANIFEST
OD_ADD: +2.75
OD_CYLINDER: +2.50
OS_CYLINDER: +2.50
OS_ADD: +2.75
OD_SPHERE: -1.50
OS_SPHERE: -1.50
OS_AXIS: 093
OD_AXIS: 086

## 2022-03-10 ASSESSMENT — REFRACTION_WEARINGRX
OS_ADD: +2.75
OS_AXIS: 093
OS_CYLINDER: +2.75
OS_SPHERE: -1.25
SPECS_TYPE: PAL
OD_AXIS: 095
OD_SPHERE: -1.25
OD_CYLINDER: +2.75
OD_ADD: +2.75

## 2022-03-10 ASSESSMENT — CONF VISUAL FIELD
OD_NORMAL: 1
OS_NORMAL: 1
METHOD: COUNTING FINGERS

## 2022-03-10 ASSESSMENT — EXTERNAL EXAM - RIGHT EYE: OD_EXAM: NORMAL

## 2022-03-10 ASSESSMENT — CUP TO DISC RATIO
OS_RATIO: 0.25
OD_RATIO: 0.15

## 2022-03-10 ASSESSMENT — EXTERNAL EXAM - LEFT EYE: OS_EXAM: NORMAL

## 2022-03-10 ASSESSMENT — SLIT LAMP EXAM - LIDS
COMMENTS: 1+ DERMATOCHALASIS
COMMENTS: 1+ DERMATOCHALASIS

## 2022-03-10 NOTE — PROGRESS NOTES
Chief Complaint   Patient presents with     Annual Eye Exam      S/P cataract surgery each eye ~10+ years ago, S/P YAG capsulotomy each eye (unknown date)     Last Eye Exam: 3+ years  Dilated Previously: Yes, side effects of dilation explained today    What are you currently using to see?  Glasses - wears full time       Distance Vision Acuity: Satisfied with vision    Near Vision Acuity: Satisfied with vision while reading and using computer with glasses    Eye Comfort: Dry eyes - left eye had some swelling/irritation about a week ago. Resolved on it's own.   Do you use eye drops? : Yes: Systane PRN - few times per week  Occupation or Hobbies: Retired    [employer] [employer] [employer]        Medical, surgical and family histories reviewed and updated 3/10/2022.       OBJECTIVE: See Ophthalmology exam    ASSESSMENT:    ICD-10-CM    1. Encounter for examination of eyes and vision with abnormal findings  Z01.01    2. Pseudophakia of both eyes  Z96.1    3. Myopia of both eyes  H52.13    4. Regular astigmatism of both eyes  H52.223    5. Presbyopia  H52.4       PLAN:     Patient Instructions   Continue use of artificial tears as needed.     Pebbles was advised of today's exam findings.  Fill glasses prescription  Allow 2 weeks to adapt to the new glasses  Copy of glasses Rx provided today.    Return in 1 year for eye exam, or sooner if needed.    The effects of the dilating drops last for 4- 6 hours.  You will be more sensitive to light and vision will be blurry up close.  Mydriatic sunglasses were given if needed.      Jonas Cruz O.D.  [address]. [place]  [address]    [phone]

## 2022-03-10 NOTE — LETTER
3/10/2022         RE: Pebbles Powell  [address]  [place] [address]        Dear Colleague,    Thank you for referring your patient, Pebbles Powell, to the [facility]. Please see a copy of my visit note below.    Chief Complaint   Patient presents with     Annual Eye Exam      S/P cataract surgery each eye ~10+ years ago, S/P YAG capsulotomy each eye (unknown date)     Last Eye Exam: 3+ years  Dilated Previously: Yes, side effects of dilation explained today    What are you currently using to see?  Glasses - wears full time       Distance Vision Acuity: Satisfied with vision    Near Vision Acuity: Satisfied with vision while reading and using computer with glasses    Eye Comfort: Dry eyes - left eye had some swelling/irritation about a week ago. Resolved on it's own.   Do you use eye drops? : Yes: Systane PRN - few times per week  Occupation or Hobbies: Retired    [facility] [employer]        Medical, surgical and family histories reviewed and updated 3/10/2022.       OBJECTIVE: See Ophthalmology exam    ASSESSMENT:    ICD-10-CM    1. Encounter for examination of eyes and vision with abnormal findings  Z01.01    2. Pseudophakia of both eyes  Z96.1    3. Myopia of both eyes  H52.13    4. Regular astigmatism of both eyes  H52.223    5. Presbyopia  H52.4       PLAN:     Patient Instructions   Continue use of artificial tears as needed.     Pebbles was advised of today's exam findings.  Fill glasses prescription  Allow 2 weeks to adapt to the new glasses  Copy of glasses Rx provided today.    Return in 1 year for eye exam, or sooner if needed.    The effects of the dilating drops last for 4- 6 hours.  You will be more sensitive to light and vision will be blurry up close.  Mydriatic sunglasses were given if needed.      Jonas Cruz O.D.  [address]. [place]  [address]    [phone]             Again, thank you for allowing me to  participate in the care of your patient.        Sincerely,        Jonas Cruz OD

## 2022-03-10 NOTE — PATIENT INSTRUCTIONS
Continue use of artificial tears as needed.     Pebbles was advised of today's exam findings.  Fill glasses prescription  Allow 2 weeks to adapt to the new glasses  Copy of glasses Rx provided today.    Return in 1 year for eye exam, or sooner if needed.    The effects of the dilating drops last for 4- 6 hours.  You will be more sensitive to light and vision will be blurry up close.  Mydriatic sunglasses were given if needed.      Jonas Cruz O.D.  [facility]  [address] [place] [address]. [place]  [place]  [address]    [phone]

## 2022-03-16 ENCOUNTER — TELEPHONE (OUTPATIENT)
Dept: UROLOGY | Facility: CLINIC | Age: 82
End: 2022-03-16
Payer: COMMERCIAL

## 2022-03-16 ENCOUNTER — PATIENT OUTREACH (OUTPATIENT)
Dept: NEPHROLOGY | Facility: CLINIC | Age: 82
End: 2022-03-16
Payer: COMMERCIAL

## 2022-03-16 NOTE — TELEPHONE ENCOUNTER
Per message note, ok to leave detailed message.  I Left message for patient advising that I am adding her on for a virtual appointment on 3/29. I left our call back number if patient has questions or can't make the date work.  Lolita Stephens, CMA

## 2022-03-16 NOTE — PROGRESS NOTES
Attempted to contact pt.  No answer.  Message left to return call.    Calling to update pt of Future appts that have been scheduled for her:    ~Lasix Renogram 3/25/22 10:30am at [facility]     ~Return Appointment with Dr. Nguyen 5/03/22 8:30am at [facility].    (the appt was next available with Dr. Nguyen.   this LPN spoke with stated will send a message to Dr. Nguyen's Team if a sooner appt becomes available to contact pt).    Wendy Goodwin LPN

## 2022-03-16 NOTE — PROGRESS NOTES
Patient has overdue labs for Dr. Collado. Upon chart review patient has not follow up with urology recommendations or follow up with Dr. Collado.     Contacted patient. She has been thinking about following up (previously cancelled appointment) as she reports that she has been going to the bathroom a lot during the day. She does not feel that she has a UTI, has more urgency. She drinks a lot of water so she was thinking that it was related to this. Reviewed last plan of care advised by Dr. Collado and Dr. Nguyen. She is agreeable to schedule these things. She prefers in person visits. Will arrange the following for patient. She declines to follow up with primary care at this time.     - Lasix Renogram (ordered by Dr. Nguyen)  - Follow up with Dr. Nguyen  - Labs and follow up with Dr. Collado    Message sent to Dr. Collado to see if she can add patient to April 14 schedule. Other appts to be arranged by Wendy Goodwin LPN.    Janiya Figueroa RN, BSN  Nephrology Care Coordinator  [facility]

## 2022-03-16 NOTE — TELEPHONE ENCOUNTER
Reason for Call:  Other     Detailed comments: [facility] called on behalf of the pt and asked to please call the pt to fit her in after imaging the pt needs to be seen to discuss results sooner the May. Please up date emergency contact info with pt if able     Phone Number Patient can be reached at: Home number on file [phone] (home)    Best Time: any    Can we leave a detailed message on this number? YES    Call taken on 3/16/2022 at 11:44 AM by Kimi Contreras

## 2022-05-05 DIAGNOSIS — L56.5 DSAP (DISSEMINATED SUPERFICIAL ACTINIC POROKERATOSIS): ICD-10-CM

## 2022-05-06 NOTE — TELEPHONE ENCOUNTER
COMPOUNDED NON-CONTROLLED SUBSTANCE (CMPD RX) - PHARMACY TO MIX COMPOUNDED MEDICATION       2% cholesterol/2% lovastatin ointment in petrolatum - apply twice daily to arms and legs  Last Written Prescription Date:  10/8/20-11/4/2021  Last Fill Quantity: 454g,   # refills: 3  Last Office Visit : 6/30/20  Follow-up in 1 year, earlier for new or changing lesions  Future Office visit:  7/20/22 [facility] [place]    Routing refill request to provider for review/approval because:  Overdue visit. New patient appointment on 7/20/22 ([facility]). Drug not on the refill protocol. Drug not active on patient's medication list

## 2022-05-06 NOTE — TELEPHONE ENCOUNTER
Received refill request for cholesterol/lovastatin ointment as the resident on call. Reviewed patient's chart and attached communication. Patient last seen 6/30/20 for DSAP. RTC 7/20/22 in [place] After reviewing the medication list and assessment and plan from last visit, the refill request was accepted without refills until her appointment in July.    CC'ing Dr. Valentine as a KYLIE Ng  PGY-4 Dermatology Resident  Pager: [phone]

## 2022-05-13 ENCOUNTER — OFFICE VISIT (OUTPATIENT)
Dept: FAMILY MEDICINE | Facility: CLINIC | Age: 82
End: 2022-05-13
Payer: COMMERCIAL

## 2022-05-13 VITALS
DIASTOLIC BLOOD PRESSURE: 69 MMHG | HEART RATE: 85 BPM | HEIGHT: 69 IN | OXYGEN SATURATION: 98 % | TEMPERATURE: 98 F | SYSTOLIC BLOOD PRESSURE: 136 MMHG | BODY MASS INDEX: 23.4 KG/M2 | WEIGHT: 158 LBS

## 2022-05-13 DIAGNOSIS — Z86.0100 HISTORY OF COLONIC POLYPS: ICD-10-CM

## 2022-05-13 DIAGNOSIS — K92.1 STOOL COLOR BLACK: Primary | ICD-10-CM

## 2022-05-13 DIAGNOSIS — K92.1 BLOOD IN STOOL: ICD-10-CM

## 2022-05-13 LAB
ERYTHROCYTE [DISTWIDTH] IN BLOOD BY AUTOMATED COUNT: 14.1 % (ref 10–15)
HCT VFR BLD AUTO: 31.4 % (ref 35–47)
HGB BLD-MCNC: 9.8 G/DL (ref 11.7–15.7)
MCH RBC QN AUTO: 30.8 PG (ref 26.5–33)
MCHC RBC AUTO-ENTMCNC: 31.2 G/DL (ref 31.5–36.5)
MCV RBC AUTO: 99 FL (ref 78–100)
PLATELET # BLD AUTO: 159 10E3/UL (ref 150–450)
RBC # BLD AUTO: 3.18 10E6/UL (ref 3.8–5.2)
WBC # BLD AUTO: 6.3 10E3/UL (ref 4–11)

## 2022-05-13 PROCEDURE — 99213 OFFICE O/P EST LOW 20 MIN: CPT | Performed by: PHYSICIAN ASSISTANT

## 2022-05-13 PROCEDURE — 85027 COMPLETE CBC AUTOMATED: CPT | Performed by: PHYSICIAN ASSISTANT

## 2022-05-13 PROCEDURE — 36415 COLL VENOUS BLD VENIPUNCTURE: CPT | Performed by: PHYSICIAN ASSISTANT

## 2022-05-13 NOTE — PROGRESS NOTES
"  Assessment & Plan     Stool color black  - Adult Gastro Ref - Procedure Only; Future    Blood in stool  - CBC with platelets; Future  - CBC with platelets    History of colonic polyps  -TARA for pathology report on past colon bx.           Return in about 2 weeks (around 5/27/2022) for Colonoscopy.    GARY Cohn [facility] LIANNE Andrea is a 81 year old who presents for the following health issues  accompanied by her self.    HPI     Patient presents with:  Black Stool: Black stool this morning    Patient notes that she has had stool changes on and off throughout the past year.  Has also noticed some blood in the stool occasionally.  Colonoscopy last summer found multiple polyps and she was told to return this year.  Has had anemia but not currently taking iron.  No other OTC supplement or medication.     Review of Systems   Constitutional, HEENT, cardiovascular, pulmonary, gi and gu systems are negative, except as otherwise noted.      Objective    /69   Pulse 85   Temp 98  F (36.7  C) (Oral)   Ht 1.747 m (5' 8.78\")   Wt 71.7 kg (158 lb)   SpO2 98%   BMI 23.48 kg/m    Body mass index is 23.48 kg/m .  Physical Exam   Total visit time is 20 Minutes with > 15 Minutes spent in care and consultation regarding Black stool with chart review, outside record review, orders and follow up plan.      Results for orders placed or performed in visit on 05/13/22 (from the past 24 hour(s))   CBC with platelets   Result Value Ref Range    WBC Count 6.3 4.0 - 11.0 10e3/uL    RBC Count 3.18 (L) 3.80 - 5.20 10e6/uL    Hemoglobin 9.8 (L) 11.7 - 15.7 g/dL    Hematocrit 31.4 (L) 35.0 - 47.0 %    MCV 99 78 - 100 fL    MCH 30.8 26.5 - 33.0 pg    MCHC 31.2 (L) 31.5 - 36.5 g/dL    RDW 14.1 10.0 - 15.0 %    Platelet Count 159 150 - 450 10e3/uL               "

## 2022-05-19 ENCOUNTER — ANCILLARY PROCEDURE (OUTPATIENT)
Dept: NUCLEAR MEDICINE | Facility: CLINIC | Age: 82
End: 2022-05-19
Attending: UROLOGY
Payer: COMMERCIAL

## 2022-05-19 DIAGNOSIS — N13.30 HYDRONEPHROSIS, UNSPECIFIED HYDRONEPHROSIS TYPE: ICD-10-CM

## 2022-05-19 PROCEDURE — A9562 TC99M MERTIATIDE: HCPCS

## 2022-05-19 PROCEDURE — 78708 K FLOW/FUNCT IMAGE W/DRUG: CPT

## 2022-05-19 RX ORDER — FUROSEMIDE 10 MG/ML
20 INJECTION INTRAMUSCULAR; INTRAVENOUS ONCE
Status: COMPLETED | OUTPATIENT
Start: 2022-05-19 | End: 2022-05-19

## 2022-05-19 RX ADMIN — FUROSEMIDE 20 MG: 10 INJECTION INTRAMUSCULAR; INTRAVENOUS at 09:45

## 2022-05-27 ENCOUNTER — TELEPHONE (OUTPATIENT)
Dept: UROLOGY | Facility: CLINIC | Age: 82
End: 2022-05-27

## 2022-05-27 ENCOUNTER — VIRTUAL VISIT (OUTPATIENT)
Dept: UROLOGY | Facility: CLINIC | Age: 82
End: 2022-05-27
Payer: COMMERCIAL

## 2022-05-27 DIAGNOSIS — N13.5 UPJ (URETEROPELVIC JUNCTION) OBSTRUCTION: Primary | ICD-10-CM

## 2022-05-27 PROCEDURE — 99213 OFFICE O/P EST LOW 20 MIN: CPT | Mod: 95 | Performed by: UROLOGY

## 2022-05-27 NOTE — PROGRESS NOTES
Referral entered. Patient  Contacted and given contact info for [place] [facility] Dr. Puente office.  Lolita Stephens, CMA

## 2022-05-27 NOTE — PROGRESS NOTES
Zully is a 81 year old who is being evaluated via a billable video visit.      How would you like to obtain your AVS? MyChart  If the video visit is dropped, the invitation should be resent by: Text to cell phone:    Will anyone else be joining your video visit? No      Video Start Time: 950    Assessment & Plan   Problem List Items Addressed This Visit    None     Visit Diagnoses     UPJ (ureteropelvic junction) obstruction    -  Primary           Review of the result(s) of each unique test - lasix renoscan  20 minutes spent on the date of the encounter doing chart review, history and exam, documentation and further activities per the note       See Patient Instructions    No follow-ups on file.    Antonio Nguyen MD  [facility]    Leno Andrea is a 81 year old who presents for the following health issues hydronephrosis    HPI     Patient is a 81-year-old  female with history of renal insufficiency.  She is seeing nephrology for that.  Recent MRI show evidence of right hydronephrosis.  I scheduled her for a Lasix renal scan and this showed Right-sided hydronephrosis with high-grade obstruction at the UPJ.  She denies any flank pain.  Review of Systems   Constitutional, HEENT, cardiovascular, pulmonary, gi and gu systems are negative, except as otherwise noted.      Objective           Vitals:  No vitals were obtained today due to virtual visit.    Physical Exam   GENERAL: Healthy, alert and no distress  EYES: Eyes grossly normal to inspection.  No discharge or erythema, or obvious scleral/conjunctival abnormalities.  RESP: No audible wheeze, cough, or visible cyanosis.  No visible retractions or increased work of breathing.    SKIN: Visible skin clear. No significant rash, abnormal pigmentation or lesions.  NEURO: Cranial nerves grossly intact.  Mentation and speech appropriate for age.  PSYCH: Mentation appears normal, affect normal/bright, judgement and insight intact, normal  speech and appearance well-groomed.    Examination:  NM RENOGRAM WITH LASIX      Date:  5/19/2022 10:35 AM      Indication:  Hydronephrosis, unspecified hydronephrosis type      Additional Information: none     Technique:     The patient received 10.4 mCi of Tc-99m labeled MAG3 intravenously.  The patient received 20 mg of Lasix intravenously at 20 minutes.     Findings:     There is a symmetrical perfusion of the kidneys with a normal flow on  the left and delayed perfusion by about 10 seconds on the right. The  split functional measurements are: 62% function on the left as  compared to 38% function on the right.     The excretion curves demonstrates slightly delayed uptake within the  left kidney but after Lasix administration there is good washout of  the collecting system.     The right kidney demonstrates markedly prolonged uptake into the  kidney and collecting system which is hydronephrotic. There is minimal  if any clearance from the collecting system suggesting high-grade  structure although a partial high-grade is suspected due to slight  visualization of the right ureter. Post void images demonstrates  significant residual activity within the right collecting system.                                                                      Impression:     1. Normal functioning left kidney and collecting system.  2. Right-sided hydronephrosis with high-grade nearly complete  obstruction at the UP junction.     NEGIN MORROW MD     UPJ obstruction with renal insufficiency: Findings discussed with patient today.  Discussed stent placement versus robotic pyeloplasty.  Refer made to Dr. Lex Puente today for further evaluation and treatment.            Video-Visit Details    Type of service:  Video Visit    Video End Time:10:06 AM    Originating Location (pt. Location): Home    Distant Location (provider location):  [facility]     Platform used for Video Visit: Flavio

## 2022-06-02 ENCOUNTER — TELEPHONE (OUTPATIENT)
Dept: FAMILY MEDICINE | Facility: CLINIC | Age: 82
End: 2022-06-02
Payer: COMMERCIAL

## 2022-06-02 NOTE — LETTER
[facility]  [address]  [place] [address] [phone]          June 7, 2022    Pebbles Powell                                                                                                                     [address]    [place] [address]            Dear Pebbles,    We have been unsuccessful reaching you by telephone. Please call the clinic at [phone] to schedule an appointment with a provider or let us know if you are getting care elsewhere.        Sincerely,         Delores So PA-C

## 2022-06-02 NOTE — TELEPHONE ENCOUNTER
----- Message from Delores So PA-C sent at 5/31/2022 10:38 AM CDT -----  Regarding: FW: Colonosocpy pathology report - genetics referral?  Have Patient schedule follow up to discuss results of colonoscopy.  Jorge VEGA   ----- Message -----  From: Bren Tamez MD  Sent: 5/31/2022   9:53 AM CDT  To: Delores So PA-C  Subject: Colonosocpy pathology report - genetics refe#    Hi Jamen,    This patient's colonoscopy report went to Har[facility] Germain, but I see that you are her new PCP. Just wanted to make sure you saw the path report with consideration of genetics referral.     Bren

## 2022-06-02 NOTE — TELEPHONE ENCOUNTER
Called and left message for patient to return call to clinic regarding scheduling an appointment for results with Delores Miranda, CMA

## 2022-06-03 NOTE — TELEPHONE ENCOUNTER
2 attempts: Called and left message for patient to return call to clinic regarding scheduling an appointment for results with Delores Miranda, CMA

## 2022-06-08 NOTE — TELEPHONE ENCOUNTER
[address]  [facility]  Mailing leter   [facility]  
Patient is being referred to urology by . to be seen in 1-2 weeks.Please triage and call patient to schedule.  
Alert and oriented to person, place, time/situation. normal mood and affect. no apparent risk to self or others.

## 2022-07-13 ENCOUNTER — TELEPHONE (OUTPATIENT)
Dept: UROLOGY | Facility: CLINIC | Age: 82
End: 2022-07-13

## 2022-07-13 NOTE — TELEPHONE ENCOUNTER
----- Message from Lolita Stephens CMA sent at 7/7/2022  8:23 AM CDT -----  Regarding: FW: please review this patient    ----- Message -----  From: Jonas Ovalles MD  Sent: 7/7/2022   7:49 AM CDT  To: Lolita Stephens CMA  Subject: RE: please review this patient                   I do not treat UPJ obstruction    SMB  ----- Message -----  From: Lolita Stephens CMA  Sent: 7/7/2022   7:24 AM CDT  To: Jonas Ovalles MD  Subject: please review this patient                       This patient has an upcoming appointment. Please review. Dr. Nguyen referred her to Dr. Puente. Let me know.  JW

## 2022-07-13 NOTE — LETTER
[facility]  [address]  [place] [address]  [phone]        July 15, 2022    Pebbles Powell                                                                                                                     [address]    [place] [address]            Dear Pebbles,  We have been unable to reach you via phone. We noticed you have an upcoming appointment with Dr. Ovalles on7/20/22. He does not see patients for your conditio, (UPJ obstruction with renal insufficiency). Dr. Nguyen did refer you to Dr. Lex Puente today for further evaluation and treatment. Please contact his office as previously stated.        Sincerely,         Antonio Nguyen MD

## 2022-07-13 NOTE — LETTER
[facility]  [address]  [place] [address]  [phone]        July 15, 2022    Pebbles Powell                                                                                                                     [address]    [place] [address]            Dear Pebbles,  We have been unable to reach you via phone. We noticed you have an upcoming appointment with Dr. Ovalles on7/20/22. He does not see patients for your condition, (UPJ obstruction with renal insufficiency). Dr. Nguyen did refer you to Dr. Lex Puente for further evaluation and treatment. Please contact his office as previously stated.        Sincerely,         Antonio Nguyen MD

## 2022-07-13 NOTE — TELEPHONE ENCOUNTER
Called and left a message for patient to call back. Dr. Ovalles does not treat stones.    Appt should be cancelled with him on 7/20/22    Georgina MAE RN Urology 7/13/2022 11:41 AM

## 2022-07-15 NOTE — TELEPHONE ENCOUNTER
Writer called and left a second voicemail for patient stating that Dr. Ovalles does not see this condition or is able to treat.  Writer informed the patient via voicemail that  Her appointment will be canceled on 7/20/2022.    [facility].    Georgina MAE RN Urology 7/15/2022 12:59 PM

## 2022-08-02 ENCOUNTER — OFFICE VISIT (OUTPATIENT)
Dept: FAMILY MEDICINE | Facility: CLINIC | Age: 82
End: 2022-08-02
Payer: COMMERCIAL

## 2022-08-02 VITALS
BODY MASS INDEX: 23.47 KG/M2 | WEIGHT: 158.5 LBS | HEART RATE: 81 BPM | TEMPERATURE: 97.8 F | HEIGHT: 69 IN | SYSTOLIC BLOOD PRESSURE: 120 MMHG | DIASTOLIC BLOOD PRESSURE: 70 MMHG | OXYGEN SATURATION: 99 %

## 2022-08-02 DIAGNOSIS — I50.32 CHRONIC DIASTOLIC CONGESTIVE HEART FAILURE (H): ICD-10-CM

## 2022-08-02 DIAGNOSIS — N18.4 CKD (CHRONIC KIDNEY DISEASE) STAGE 4, GFR 15-29 ML/MIN (H): ICD-10-CM

## 2022-08-02 DIAGNOSIS — Z12.11 SCREEN FOR COLON CANCER: ICD-10-CM

## 2022-08-02 DIAGNOSIS — Z23 HIGH PRIORITY FOR 2019-NCOV VACCINE: Primary | ICD-10-CM

## 2022-08-02 DIAGNOSIS — Z13.220 SCREENING FOR HYPERLIPIDEMIA: ICD-10-CM

## 2022-08-02 LAB
ANION GAP SERPL CALCULATED.3IONS-SCNC: 4 MMOL/L (ref 3–14)
BUN SERPL-MCNC: 33 MG/DL (ref 7–30)
CALCIUM SERPL-MCNC: 8.7 MG/DL (ref 8.5–10.1)
CHLORIDE BLD-SCNC: 113 MMOL/L (ref 94–109)
CHOLEST SERPL-MCNC: 151 MG/DL
CO2 SERPL-SCNC: 24 MMOL/L (ref 20–32)
CREAT SERPL-MCNC: 1.85 MG/DL (ref 0.52–1.04)
CREAT UR-MCNC: 72 MG/DL
FASTING STATUS PATIENT QL REPORTED: NORMAL
GFR SERPL CREATININE-BSD FRML MDRD: 27 ML/MIN/1.73M2
GLUCOSE BLD-MCNC: 67 MG/DL (ref 70–99)
HDLC SERPL-MCNC: 51 MG/DL
LDLC SERPL CALC-MCNC: 87 MG/DL
MICROALBUMIN UR-MCNC: 13 MG/L
MICROALBUMIN/CREAT UR: 18.06 MG/G CR (ref 0–25)
NONHDLC SERPL-MCNC: 100 MG/DL
POTASSIUM BLD-SCNC: 5.2 MMOL/L (ref 3.4–5.3)
SODIUM SERPL-SCNC: 141 MMOL/L (ref 133–144)
TRIGL SERPL-MCNC: 64 MG/DL

## 2022-08-02 PROCEDURE — 82043 UR ALBUMIN QUANTITATIVE: CPT | Performed by: PHYSICIAN ASSISTANT

## 2022-08-02 PROCEDURE — 99213 OFFICE O/P EST LOW 20 MIN: CPT | Mod: 25 | Performed by: PHYSICIAN ASSISTANT

## 2022-08-02 PROCEDURE — 80048 BASIC METABOLIC PNL TOTAL CA: CPT | Performed by: PHYSICIAN ASSISTANT

## 2022-08-02 PROCEDURE — 36415 COLL VENOUS BLD VENIPUNCTURE: CPT | Performed by: PHYSICIAN ASSISTANT

## 2022-08-02 PROCEDURE — 91305 COVID-19,PF,PFIZER (12+ YRS): CPT | Performed by: PHYSICIAN ASSISTANT

## 2022-08-02 PROCEDURE — 80061 LIPID PANEL: CPT | Performed by: PHYSICIAN ASSISTANT

## 2022-08-02 PROCEDURE — 0054A COVID-19,PF,PFIZER (12+ YRS): CPT | Performed by: PHYSICIAN ASSISTANT

## 2022-08-02 ASSESSMENT — PATIENT HEALTH QUESTIONNAIRE - PHQ9: SUM OF ALL RESPONSES TO PHQ QUESTIONS 1-9: 2

## 2022-08-02 NOTE — PROGRESS NOTES
"  Assessment & Plan     Chronic diastolic congestive heart failure (H)  - Lipid panel reflex to direct LDL Non-fasting; Future  - Lipid panel reflex to direct LDL Non-fasting    CKD (chronic kidney disease) stage 4, GFR 15-29 ml/min (H)  - Basic metabolic panel  (Ca, Cl, CO2, Creat, Gluc, K, Na, BUN); Future  - Albumin Random Urine Quantitative with Creat Ratio; Future  - Basic metabolic panel  (Ca, Cl, CO2, Creat, Gluc, K, Na, BUN)  - Albumin Random Urine Quantitative with Creat Ratio    Screening for hyperlipidemia  - Lipid panel reflex to direct LDL Non-fasting; Future      High priority for 2019-nCoV vaccine  - COVID-19,PF,PFIZER (12+ Yrs GRAY LABEL)        Return in about 1 year (around 8/2/2023) for Routine preventive.    Delores So PA-C  [facility] [place] Andrea is a 81 year old accompanied by her self, presenting for the following health issues:  Ear Lavage      HPI     Patient presents with:  Ear Lavage      Patient noted that she removed a large amount of wax from her L ear this morning and presents for an ear check.  She is amenable to routine lab work at this time for convenience.     Review of Systems   Constitutional, HEENT, cardiovascular, pulmonary, gi and gu systems are negative, except as otherwise noted.      Objective    /70   Pulse 81   Temp 97.8  F (36.6  C) (Oral)   Ht 1.746 m (5' 8.75\")   Wt 71.9 kg (158 lb 8 oz)   SpO2 99%   BMI 23.58 kg/m    Body mass index is 23.58 kg/m .  Physical Exam   GENERAL: healthy, alert and no distress  HENT: ear canals and TM's normal, nose and mouth without ulcers or lesions  NEURO: Normal strength and tone, mentation intact and speech normal            "

## 2022-08-16 ENCOUNTER — ANCILLARY PROCEDURE (OUTPATIENT)
Dept: GENERAL RADIOLOGY | Facility: CLINIC | Age: 82
End: 2022-08-16
Attending: ORTHOPAEDIC SURGERY
Payer: COMMERCIAL

## 2022-08-16 ENCOUNTER — OFFICE VISIT (OUTPATIENT)
Dept: ORTHOPEDICS | Facility: CLINIC | Age: 82
End: 2022-08-16

## 2022-08-16 VITALS
SYSTOLIC BLOOD PRESSURE: 151 MMHG | WEIGHT: 158 LBS | HEART RATE: 74 BPM | DIASTOLIC BLOOD PRESSURE: 77 MMHG | HEIGHT: 69 IN | BODY MASS INDEX: 23.4 KG/M2

## 2022-08-16 DIAGNOSIS — M17.12 PRIMARY OSTEOARTHRITIS OF LEFT KNEE: Primary | ICD-10-CM

## 2022-08-16 DIAGNOSIS — M17.12 PRIMARY OSTEOARTHRITIS OF LEFT KNEE: ICD-10-CM

## 2022-08-16 PROCEDURE — 99203 OFFICE O/P NEW LOW 30 MIN: CPT | Mod: 25 | Performed by: ORTHOPAEDIC SURGERY

## 2022-08-16 PROCEDURE — 20610 DRAIN/INJ JOINT/BURSA W/O US: CPT | Mod: LT | Performed by: ORTHOPAEDIC SURGERY

## 2022-08-16 PROCEDURE — 73562 X-RAY EXAM OF KNEE 3: CPT | Mod: TC | Performed by: RADIOLOGY

## 2022-08-16 RX ORDER — LIDOCAINE HYDROCHLORIDE 10 MG/ML
4 INJECTION, SOLUTION INFILTRATION; PERINEURAL
Status: SHIPPED | OUTPATIENT
Start: 2022-08-16

## 2022-08-16 RX ORDER — METHYLPREDNISOLONE ACETATE 80 MG/ML
80 INJECTION, SUSPENSION INTRA-ARTICULAR; INTRALESIONAL; INTRAMUSCULAR; SOFT TISSUE
Status: SHIPPED | OUTPATIENT
Start: 2022-08-16

## 2022-08-16 RX ADMIN — LIDOCAINE HYDROCHLORIDE 4 ML: 10 INJECTION, SOLUTION INFILTRATION; PERINEURAL at 09:41

## 2022-08-16 RX ADMIN — METHYLPREDNISOLONE ACETATE 80 MG: 80 INJECTION, SUSPENSION INTRA-ARTICULAR; INTRALESIONAL; INTRAMUSCULAR; SOFT TISSUE at 09:41

## 2022-08-16 NOTE — PROGRESS NOTES
SUBJECTIVE:   Pebbles Powell is a 81 year old female who is seen as self referral for evaluation of left knee pain.  Duration: 1 year or so.  She would like a corticosteroid injection today.     Present symptoms: history a bit confusing, because she   Is able to walk a lot.  No night pain  No pain sitting.   Pain medially constantly.  I think she is saying the pain doesn't get worse with those activities..     Treatments tried to this point: tylenol, roll-on analgesic cream    Previous knee issues: history of RIGHT TOTAL KNEE ARTHROPLASTY 2015. Doing well.    Past Medical History:   Past Medical History:   Diagnosis Date     Arthritis      BCC (basal cell carcinoma), right lower leg      CHF (Congestive Heart Failure), diastolic dysfunction      CKD (chronic kidney disease)      Congestive heart failure (H)      History of basal cell carcinoma      PONV (postoperative nausea and vomiting)     usually has N&V with anesthesia     Porokeratosis      Renal disease      Sleep apnea      Unspecified hypothyroidism      Past Surgical History:   Past Surgical History:   Procedure Laterality Date     APPENDECTOMY       ARTHROPLASTY KNEE Right 7/13/2015    Procedure: ARTHROPLASTY KNEE;  Surgeon: Santino Storm MD;  Location: [facility] OR     ARTHROSCOPY KNEE  11/23/2010    ARTHROSCOPY KNEE performed by BROOKLYNN PULIDO at  OR     BIOPSY       CATARACT IOL, RT/LT       COLONOSCOPY  6/1/2011    Procedure:COMBINED COLONOSCOPY, SINGLE BIOPSY/POLYPECTOMY BY BIOPSY; Surgeon:JEANNETTE WYNNE; Location: [facility]     COLPORRHAPHY ANTERIOR       DILATION AND CURETTAGE       ENT SURGERY      T&A     ESOPHAGOSCOPY, GASTROSCOPY, DUODENOSCOPY (EGD), COMBINED  6/1/2011    Procedure:COMBINED ESOPHAGOSCOPY, GASTROSCOPY, DUODENOSCOPY (EGD), BIOPSY SINGLE OR MULTIPLE; MULTIPLE; Surgeon:JEANNETTE WYNNE; Location: GI     EXCISE LESION LOWER EXTREMITY  7/18/2014    Procedure: EXCISE LESION LOWER EXTREMITY;  Surgeon: Santos Roberson,  "MD;  Location: [facility] OR     HEMORRHOIDECTOMY       HYSTERECTOMY, GABRIEL      fibroids     LAPAROTOMY, TUMOR DEBULKING, COMBINED  2/9/2012    Procedure:COMBINED LAPAROTOMY, TUMOR DEBULKING; Exploratory Laparotomy,  Bilateral Salpingo Oophorectomy; Surgeon:MIKIE LU; Location: OR     [facility] ANTER VESICOURETHROPEXY,SIMPLE       Family History:   Family History   Problem Relation Age of Onset     Alcohol/Drug Mother      Obesity Mother      Alzheimer Disease Mother      Cancer Father         Pancreatic     Cardiovascular Father         heart disease     Alcohol/Drug Father      Social History:   Social History     Tobacco Use     Smoking status: Former Smoker     Packs/day: 1.50     Years: 50.00     Pack years: 75.00     Types: Cigarettes     Quit date: 8/20/2007     Years since quitting: 15.0     Smokeless tobacco: Never Used   Substance Use Topics     Alcohol use: No     Alcohol/week: 0.0 standard drinks     Comment: Alcoholic, treatment in 1980 and 1988       Review of Systems:  Constitutional:  NEGATIVE for fever, chills, change in weight  Integumentary/Skin:  NEGATIVE for worrisome rashes, moles or lesions  Eyes:  NEGATIVE for vision changes or irritation  ENT/Mouth:  NEGATIVE for ear, mouth and throat problems  Resp:  NEGATIVE for significant cough or SOB  Breast:  NEGATIVE for masses, tenderness or discharge  CV:  NEGATIVE for chest pain, palpitations or peripheral edema  GI:  NEGATIVE for nausea, abdominal pain, heartburn, or change in bowel habits  :  Negative   Musculoskeletal:  See HPI above  Neuro:  NEGATIVE for weakness, dizziness or paresthesias  Endocrine:  NEGATIVE for temperature intolerance, skin/hair changes  Heme/allergy/immune:  NEGATIVE for bleeding problems  Psychiatric:  NEGATIVE for changes in mood or affect      OBJECTIVE:  Physical Exam:  BP (!) 151/77   Pulse 74   Ht 1.746 m (5' 8.75\")   Wt 71.7 kg (158 lb)   BMI 23.50 kg/m    General Appearance: healthy, alert and no distress   Skin: no " suspicious lesions or rashes  Neuro: Normal strength and tone, mentation intact and speech normal  Vascular: good pulses, and cappillary refill   Lymph: no lymphadenopathy   Psych:  mentation appears normal and affect normal/bright  Resp: no increased work of breathing     Left Knee Exam:  Gait: walks with normal gait  Alignment: mild varus    Patellofemoral joint: moderate crepitations in the patellofemoral joint.  Effusion: mild  ROM: 3-120*  Tender: medial joint line and lateral joint line  Masses: none  Ligaments:   Lachman's: stable   Anterior/Posterior drawer: stable,   Varus/Valgus stress: stable to varus and valgus stress    X-rays:  Obtained today, left knee, reviewed in the office with the patient today: moderate-Severe medial joint space narrowing,, osteopenia. Some medial bone loss.       ASSESSMENT:   Encounter Diagnosis   Name Primary?     Primary osteoarthritis of left knee Yes        PLAN:   She does not want to consider total knee arthroplasty. She would like a corticosteroid injection. Not interested in physical therapy.  With the patient's consent, left knee(s) injected intra-articularly with 80mg of Depomedrol and 4cc of local anesthetic after sterile prep.    Return to clinic: as needed     ALBIN Landers MD  Dept. Orthopedic Surgery  [facility]

## 2022-08-16 NOTE — LETTER
8/16/2022         RE: Pebbles Powell  [address]  [address]  [place] [address]        Dear Colleague,    Thank you for referring your patient, Pebbles Powell, to the [facility]. Please see a copy of my visit note below.    SUBJECTIVE:   Pebbles Powell is a 81 year old female who is seen as self referral for evaluation of left knee pain.  Duration: 1 year or so.  She would like a corticosteroid injection today.     Present symptoms: history a bit confusing, because she   Is able to walk a lot.  No night pain  No pain sitting.   Pain medially constantly.  I think she is saying the pain doesn't get worse with those activities..     Treatments tried to this point: tylenol, roll-on analgesic cream    Previous knee issues: history of RIGHT TOTAL KNEE ARTHROPLASTY 2015. Doing well.    Past Medical History:   Past Medical History:   Diagnosis Date     Arthritis      BCC (basal cell carcinoma), right lower leg      CHF (Congestive Heart Failure), diastolic dysfunction      CKD (chronic kidney disease)      Congestive heart failure (H)      History of basal cell carcinoma      PONV (postoperative nausea and vomiting)     usually has N&V with anesthesia     Porokeratosis      Renal disease      Sleep apnea      Unspecified hypothyroidism      Past Surgical History:   Past Surgical History:   Procedure Laterality Date     APPENDECTOMY       ARTHROPLASTY KNEE Right 7/13/2015    Procedure: ARTHROPLASTY KNEE;  Surgeon: Santino Storm MD;  Location:  OR     ARTHROSCOPY KNEE  11/23/2010    ARTHROSCOPY KNEE performed by BROOKLYNN PULIDO at  OR     BIOPSY       CATARACT IOL, RT/LT       COLONOSCOPY  6/1/2011    Procedure:COMBINED COLONOSCOPY, SINGLE BIOPSY/POLYPECTOMY BY BIOPSY; Surgeon:JEANNETTE WYNNE; Location: GI     COLPORRHAPHY ANTERIOR       DILATION AND CURETTAGE       ENT SURGERY      T&A     ESOPHAGOSCOPY, GASTROSCOPY, DUODENOSCOPY (EGD), COMBINED  6/1/2011     Procedure:COMBINED ESOPHAGOSCOPY, GASTROSCOPY, DUODENOSCOPY (EGD), BIOPSY SINGLE OR MULTIPLE; MULTIPLE; Surgeon:JEANNETTE WYNNE; Location:[facility]     EXCISE LESION LOWER EXTREMITY  7/18/2014    Procedure: EXCISE LESION LOWER EXTREMITY;  Surgeon: Santos Roberson MD;  Location: [facility]     HEMORRHOIDECTOMY       HYSTERECTOMY, GABRIEL      fibroids     LAPAROTOMY, TUMOR DEBULKING, COMBINED  2/9/2012    Procedure:COMBINED LAPAROTOMY, TUMOR DEBULKING; Exploratory Laparotomy,  Bilateral Salpingo Oophorectomy; Surgeon:MIKIE LU; Location:[facility]     ZZC ANTER VESICOURETHROPEXY,SIMPLE       Family History:   Family History   Problem Relation Age of Onset     Alcohol/Drug Mother      Obesity Mother      Alzheimer Disease Mother      Cancer Father         Pancreatic     Cardiovascular Father         heart disease     Alcohol/Drug Father      Social History:   Social History     Tobacco Use     Smoking status: Former Smoker     Packs/day: 1.50     Years: 50.00     Pack years: 75.00     Types: Cigarettes     Quit date: 8/20/2007     Years since quitting: 15.0     Smokeless tobacco: Never Used   Substance Use Topics     Alcohol use: No     Alcohol/week: 0.0 standard drinks     Comment: Alcoholic, treatment in 1980 and 1988       Review of Systems:  Constitutional:  NEGATIVE for fever, chills, change in weight  Integumentary/Skin:  NEGATIVE for worrisome rashes, moles or lesions  Eyes:  NEGATIVE for vision changes or irritation  ENT/Mouth:  NEGATIVE for ear, mouth and throat problems  Resp:  NEGATIVE for significant cough or SOB  Breast:  NEGATIVE for masses, tenderness or discharge  CV:  NEGATIVE for chest pain, palpitations or peripheral edema  GI:  NEGATIVE for nausea, abdominal pain, heartburn, or change in bowel habits  :  Negative   Musculoskeletal:  See HPI above  Neuro:  NEGATIVE for weakness, dizziness or paresthesias  Endocrine:  NEGATIVE for temperature intolerance, skin/hair changes  Heme/allergy/immune:   "NEGATIVE for bleeding problems  Psychiatric:  NEGATIVE for changes in mood or affect      OBJECTIVE:  Physical Exam:  BP (!) 151/77   Pulse 74   Ht 1.746 m (5' 8.75\")   Wt 71.7 kg (158 lb)   BMI 23.50 kg/m    General Appearance: healthy, alert and no distress   Skin: no suspicious lesions or rashes  Neuro: Normal strength and tone, mentation intact and speech normal  Vascular: good pulses, and cappillary refill   Lymph: no lymphadenopathy   Psych:  mentation appears normal and affect normal/bright  Resp: no increased work of breathing     Left Knee Exam:  Gait: walks with normal gait  Alignment: mild varus    Patellofemoral joint: moderate crepitations in the patellofemoral joint.  Effusion: mild  ROM: 3-120*  Tender: medial joint line and lateral joint line  Masses: none  Ligaments:   Lachman's: stable   Anterior/Posterior drawer: stable,   Varus/Valgus stress: stable to varus and valgus stress    X-rays:  Obtained today, left knee, reviewed in the office with the patient today: moderate-Severe medial joint space narrowing,, osteopenia. Some medial bone loss.       ASSESSMENT:   Encounter Diagnosis   Name Primary?     Primary osteoarthritis of left knee Yes        PLAN:   She does not want to consider total knee arthroplasty. She would like a corticosteroid injection. Not interested in physical therapy.  With the patient's consent, left knee(s) injected intra-articularly with 80mg of Depomedrol and 4cc of local anesthetic after sterile prep.    Return to clinic: as needed     ALBIN Landers MD  Dept. Orthopedic Surgery  [facility]     Large Joint Injection/Arthocentesis: L knee joint    Date/Time: 8/16/2022 9:41 AM  Performed by: Santino Landers MD  Authorized by: Santino Landers MD     Indications:  Pain and osteoarthritis  Needle Size:  22 G  Guidance: landmark guided    Approach:  Anteromedial  Location:  Knee      Medications:  4 mL lidocaine 1 %; 80 mg methylPREDNISolone 80 " MG/ML  Procedure discussed: discussed risks, benefits, and alternatives    Consent Given by:  Patient  Timeout: timeout called immediately prior to procedure    Prep: patient was prepped and draped in usual sterile fashion              Again, thank you for allowing me to participate in the care of your patient.        Sincerely,        Santino Landers MD

## 2022-08-16 NOTE — PROGRESS NOTES
Large Joint Injection/Arthocentesis: L knee joint    Date/Time: 8/16/2022 9:41 AM  Performed by: Santino Landers MD  Authorized by: Santino Landers MD     Indications:  Pain and osteoarthritis  Needle Size:  22 G  Guidance: landmark guided    Approach:  Anteromedial  Location:  Knee      Medications:  4 mL lidocaine 1 %; 80 mg methylPREDNISolone 80 MG/ML  Procedure discussed: discussed risks, benefits, and alternatives    Consent Given by:  Patient  Timeout: timeout called immediately prior to procedure    Prep: patient was prepped and draped in usual sterile fashion

## 2022-09-13 DIAGNOSIS — E03.9 HYPOTHYROIDISM, UNSPECIFIED TYPE: ICD-10-CM

## 2022-09-15 RX ORDER — LEVOTHYROXINE SODIUM 112 UG/1
TABLET ORAL
Qty: 90 TABLET | Refills: 2 | Status: SHIPPED | OUTPATIENT
Start: 2022-09-15 | End: 2023-01-24 | Stop reason: DRUGHIGH

## 2022-09-15 NOTE — TELEPHONE ENCOUNTER
Prescription approved per [facility] Refill Protocol     Cori Hickey, RN, BSN, PHN  [facility]: [facility]

## 2022-11-09 ENCOUNTER — OFFICE VISIT (OUTPATIENT)
Dept: FAMILY MEDICINE | Facility: CLINIC | Age: 82
End: 2022-11-09
Payer: COMMERCIAL

## 2022-11-09 VITALS
SYSTOLIC BLOOD PRESSURE: 124 MMHG | DIASTOLIC BLOOD PRESSURE: 64 MMHG | WEIGHT: 160.2 LBS | TEMPERATURE: 98.9 F | BODY MASS INDEX: 23.83 KG/M2 | HEART RATE: 70 BPM | OXYGEN SATURATION: 97 %

## 2022-11-09 DIAGNOSIS — E03.9 ACQUIRED HYPOTHYROIDISM: ICD-10-CM

## 2022-11-09 DIAGNOSIS — K92.1 MELENA: ICD-10-CM

## 2022-11-09 DIAGNOSIS — F10.21 ALCOHOL DEPENDENCE IN REMISSION (H): ICD-10-CM

## 2022-11-09 DIAGNOSIS — T14.8XXA BRUISING: Primary | ICD-10-CM

## 2022-11-09 DIAGNOSIS — N18.4 CKD (CHRONIC KIDNEY DISEASE) STAGE 4, GFR 15-29 ML/MIN (H): ICD-10-CM

## 2022-11-09 DIAGNOSIS — D63.1 ANEMIA SECONDARY TO RENAL FAILURE: ICD-10-CM

## 2022-11-09 DIAGNOSIS — I50.32 CHRONIC HEART FAILURE WITH PRESERVED EJECTION FRACTION (H): ICD-10-CM

## 2022-11-09 DIAGNOSIS — N18.9 ANEMIA SECONDARY TO RENAL FAILURE: ICD-10-CM

## 2022-11-09 DIAGNOSIS — R60.9 DEPENDENT EDEMA: ICD-10-CM

## 2022-11-09 PROBLEM — G47.30 SLEEP APNEA: Status: ACTIVE | Noted: 2022-11-09

## 2022-11-09 PROBLEM — E53.8 B12 DEFICIENCY: Status: ACTIVE | Noted: 2022-11-09

## 2022-11-09 LAB
BASOPHILS # BLD AUTO: 0 10E3/UL (ref 0–0.2)
BASOPHILS NFR BLD AUTO: 0 %
EOSINOPHIL # BLD AUTO: 0.1 10E3/UL (ref 0–0.7)
EOSINOPHIL NFR BLD AUTO: 1 %
ERYTHROCYTE [DISTWIDTH] IN BLOOD BY AUTOMATED COUNT: 13.2 % (ref 10–15)
HCT VFR BLD AUTO: 30.4 % (ref 35–47)
HGB BLD-MCNC: 9.4 G/DL (ref 11.7–15.7)
IMM GRANULOCYTES # BLD: 0 10E3/UL
IMM GRANULOCYTES NFR BLD: 0 %
LYMPHOCYTES # BLD AUTO: 4.3 10E3/UL (ref 0.8–5.3)
LYMPHOCYTES NFR BLD AUTO: 51 %
MCH RBC QN AUTO: 30.9 PG (ref 26.5–33)
MCHC RBC AUTO-ENTMCNC: 30.9 G/DL (ref 31.5–36.5)
MCV RBC AUTO: 100 FL (ref 78–100)
MONOCYTES # BLD AUTO: 0.7 10E3/UL (ref 0–1.3)
MONOCYTES NFR BLD AUTO: 9 %
NEUTROPHILS # BLD AUTO: 3.3 10E3/UL (ref 1.6–8.3)
NEUTROPHILS NFR BLD AUTO: 39 %
NRBC # BLD AUTO: 0 10E3/UL
NRBC BLD AUTO-RTO: 0 /100
PLATELET # BLD AUTO: 217 10E3/UL (ref 150–450)
RBC # BLD AUTO: 3.04 10E6/UL (ref 3.8–5.2)
WBC # BLD AUTO: 8.4 10E3/UL (ref 4–11)

## 2022-11-09 PROCEDURE — 83540 ASSAY OF IRON: CPT | Performed by: FAMILY MEDICINE

## 2022-11-09 PROCEDURE — 80069 RENAL FUNCTION PANEL: CPT | Performed by: FAMILY MEDICINE

## 2022-11-09 PROCEDURE — 91312 COVID-19,PF,PFIZER BOOSTER BIVALENT: CPT | Performed by: FAMILY MEDICINE

## 2022-11-09 PROCEDURE — 82728 ASSAY OF FERRITIN: CPT | Performed by: FAMILY MEDICINE

## 2022-11-09 PROCEDURE — 83550 IRON BINDING TEST: CPT | Performed by: FAMILY MEDICINE

## 2022-11-09 PROCEDURE — 99214 OFFICE O/P EST MOD 30 MIN: CPT | Performed by: FAMILY MEDICINE

## 2022-11-09 PROCEDURE — 0124A COVID-19,PF,PFIZER BOOSTER BIVALENT: CPT | Performed by: FAMILY MEDICINE

## 2022-11-09 PROCEDURE — 36415 COLL VENOUS BLD VENIPUNCTURE: CPT | Performed by: FAMILY MEDICINE

## 2022-11-09 PROCEDURE — 85025 COMPLETE CBC W/AUTO DIFF WBC: CPT | Performed by: FAMILY MEDICINE

## 2022-11-09 RX ORDER — ASPIRIN 81 MG
100 TABLET, DELAYED RELEASE (ENTERIC COATED) ORAL DAILY
Qty: 90 TABLET | Refills: 3 | Status: SHIPPED | OUTPATIENT
Start: 2022-11-09 | End: 2023-07-27

## 2022-11-09 RX ORDER — FERROUS SULFATE 325(65) MG
325 TABLET ORAL
Qty: 90 TABLET | Refills: 3 | Status: SHIPPED | OUTPATIENT
Start: 2022-11-09 | End: 2023-07-27

## 2022-11-09 NOTE — PROGRESS NOTES
Assessment & Plan     (T14.8XXA) Bruising  (primary encounter diagnosis)  (K92.1) Melena  Comment: patient refuses upper endoscopy and colonoscopy - she is aware of risk for cancer but prefers symptomatic care; Differential diagnoses would include: PUD, gastritis, thrombocytopenia    Plan: omeprazole (PRILOSEC) 20 MG DR capsule        Discussed risks and benefits of this medication.  Follow up in one month or sooner for worsening of symptoms or side effects.     (N18.9,  D63.1) Anemia secondary to renal failure  (N18.4) CKD (chronic kidney disease) stage 4, GFR 15-29 ml/min (H)  Plan: CBC with platelets and differential, Ferritin,         Iron and iron binding capacity, Renal panel         (Alb, BUN, Ca, Cl, CO2, Creat, Gluc, Phos, K,         Na)        Recommended daily iron supplement - patient would like to have iron infusion if she is a candidate; consider pending labs today     (I50.32) Chronic heart failure with preserved ejection fraction (H)  Comment: euvolemic   Plan: CBC with platelets and differential, Renal         panel (Alb, BUN, Ca, Cl, CO2, Creat, Gluc,         Phos, K, Na)          (F10.21) Alcohol dependence in remission (H)  (R60.9) Dependent edema  Plan: continue compression stockings as needed     (E03.9) Acquired hypothyroidism  Comment: euthyroid on replacement                See Patient Instructions    Return in about 1 month (around 12/9/2022) for anemia .    Bren Tamez MD  [facility] LIANNE Andrea is a 81 year old, presenting for the following health issues:  Bleeding/Bruising (Random bruising on hands and arms)      HPI   Pebbles Powell is a 81 year old female who presents with extensive bruising on upper extremities. Symptom onset has been gradual, worsening for a time period of months. Severity is described as moderate and generalized. Course of her symptoms over time is worsening, accompanied by fatigue. She endorses ongoing melena but does not want  to do colonoscopy as previously recommended.     Review of Systems   CONSTITUTIONAL: NEGATIVE for fever, chills, change in weight  INTEGUMENTARY/SKIN: see HPI   EYES: NEGATIVE for vision changes or irritation  ENT/MOUTH: NEGATIVE for ear, mouth and throat problems  RESP: NEGATIVE for significant cough or SOB  CV: NEGATIVE for chest pain, palpitations or peripheral edema  GI: melena  MUSCULOSKELETAL: NEGATIVE for significant arthralgias or myalgia  NEURO: NEGATIVE for weakness, dizziness or paresthesias  HEME/ALLERGY/IMMUNE: anemia  PSYCHIATRIC: HX of alcoholism       Objective    /64 (BP Location: Right arm, Patient Position: Sitting, Cuff Size: Adult Regular)   Pulse 70   Temp 98.9  F (37.2  C) (Oral)   Wt 72.7 kg (160 lb 3.2 oz)   SpO2 97%   BMI 23.83 kg/m    Body mass index is 23.83 kg/m .  Physical Exam   GENERAL: alert, no distress and elderly  EYES: Eyes grossly normal to inspection, PERRL and conjunctivae and sclerae normal  NECK: no adenopathy, no asymmetry, masses, or scars and thyroid normal to palpation  RESP: lungs clear to auscultation - no rales, rhonchi or wheezes  CV: regular rate and rhythm, normal S1 S2, no S3 or S4, no murmur, click or rub   MS: woody bipedal edema; normal gait   SKIN: bilateral upper extremity ecchymoses   NEURO: mentation intact  PSYCH: affect flat, judgement and insight intact and appearance well groomed    Office Visit on 08/02/2022   Component Date Value Ref Range Status     Sodium 08/02/2022 141  133 - 144 mmol/L Final     Potassium 08/02/2022 5.2  3.4 - 5.3 mmol/L Final     Chloride 08/02/2022 113 (H)  94 - 109 mmol/L Final     Carbon Dioxide (CO2) 08/02/2022 24  20 - 32 mmol/L Final     Anion Gap 08/02/2022 4  3 - 14 mmol/L Final     Urea Nitrogen 08/02/2022 33 (H)  7 - 30 mg/dL Final     Creatinine 08/02/2022 1.85 (H)  0.52 - 1.04 mg/dL Final     Calcium 08/02/2022 8.7  8.5 - 10.1 mg/dL Final     Glucose 08/02/2022 67 (L)  70 - 99 mg/dL Final     GFR Estimate  08/02/2022 27 (L)  >60 mL/min/1.73m2 Final    Effective December 21, 2021 eGFRcr in adults is calculated using the 2021 CKD-EPI creatinine equation which includes age and gender (Patti et al., [place], DOI: 10.1056/FDRAba5735487)     Cholesterol 08/02/2022 151  <200 mg/dL Final     Triglycerides 08/02/2022 64  <150 mg/dL Final     Direct Measure HDL 08/02/2022 51  >=50 mg/dL Final     LDL Cholesterol Calculated 08/02/2022 87  <=100 mg/dL Final     Non HDL Cholesterol 08/02/2022 100  <130 mg/dL Final     Patient Fasting > 8hrs? 08/02/2022 Unknown   Final     Creatinine Urine mg/dL 08/02/2022 72  mg/dL Final     Albumin Urine mg/L 08/02/2022 13  mg/L Final     Albumin Urine mg/g Cr 08/02/2022 18.06  0.00 - 25.00 mg/g Cr Final     Results for orders placed or performed in visit on 11/09/22 (from the past 24 hour(s))   CBC with platelets and differential    Narrative    The following orders were created for panel order CBC with platelets and differential.  Procedure                               Abnormality         Status                     ---------                               -----------         ------                     CBC with platelets and d...[[id number]]                      In process                   Please view results for these tests on the individual orders.

## 2022-11-10 LAB
ALBUMIN SERPL-MCNC: 3.3 G/DL (ref 3.4–5)
ANION GAP SERPL CALCULATED.3IONS-SCNC: 5 MMOL/L (ref 3–14)
BUN SERPL-MCNC: 37 MG/DL (ref 7–30)
CALCIUM SERPL-MCNC: 8.8 MG/DL (ref 8.5–10.1)
CHLORIDE BLD-SCNC: 114 MMOL/L (ref 94–109)
CO2 SERPL-SCNC: 23 MMOL/L (ref 20–32)
CREAT SERPL-MCNC: 1.81 MG/DL (ref 0.52–1.04)
FERRITIN SERPL-MCNC: 175 NG/ML (ref 8–252)
GFR SERPL CREATININE-BSD FRML MDRD: 28 ML/MIN/1.73M2
GLUCOSE BLD-MCNC: 89 MG/DL (ref 70–99)
IRON SATN MFR SERPL: 21 % (ref 15–46)
IRON SERPL-MCNC: 49 UG/DL (ref 35–180)
PHOSPHATE SERPL-MCNC: 3.7 MG/DL (ref 2.5–4.5)
POTASSIUM BLD-SCNC: 5.1 MMOL/L (ref 3.4–5.3)
SODIUM SERPL-SCNC: 142 MMOL/L (ref 133–144)
TIBC SERPL-MCNC: 228 UG/DL (ref 240–430)

## 2022-11-10 NOTE — RESULT ENCOUNTER NOTE
Zully,    Your chronic anemia is stable and your iron levels are fine. No other cause for easy bruising was found (this can be a common occurrence when the skin is thin with normal aging). I recommend follow-up with your nephrologist later this month as planned. Discuss your anemia with your specialist as well.     Bren Tamez MD

## 2022-11-18 ENCOUNTER — TELEPHONE (OUTPATIENT)
Dept: NEPHROLOGY | Facility: CLINIC | Age: 82
End: 2022-11-18

## 2022-11-18 DIAGNOSIS — N18.4 CKD (CHRONIC KIDNEY DISEASE) STAGE 4, GFR 15-29 ML/MIN (H): Primary | ICD-10-CM

## 2022-11-18 NOTE — TELEPHONE ENCOUNTER
Patient called regarding scheduling a lab appointment prior to visit on 11/28 at 3:45 pm. Left voice message.  ERIC Maravilla

## 2022-11-21 ENCOUNTER — TELEPHONE (OUTPATIENT)
Dept: NEPHROLOGY | Facility: CLINIC | Age: 82
End: 2022-11-21

## 2022-11-21 NOTE — TELEPHONE ENCOUNTER
Patient contacted regarding a lab appointment needing to be scheduled. Left vm. Patient informed to call [phone] to schedule a lab appointment at her earliest convenience.   ERIC Maravilla

## 2023-01-23 ENCOUNTER — OFFICE VISIT (OUTPATIENT)
Dept: FAMILY MEDICINE | Facility: CLINIC | Age: 83
End: 2023-01-23
Payer: COMMERCIAL

## 2023-01-23 VITALS
RESPIRATION RATE: 20 BRPM | OXYGEN SATURATION: 98 % | BODY MASS INDEX: 23.55 KG/M2 | DIASTOLIC BLOOD PRESSURE: 60 MMHG | HEIGHT: 69 IN | TEMPERATURE: 98.6 F | SYSTOLIC BLOOD PRESSURE: 131 MMHG | WEIGHT: 159 LBS | HEART RATE: 75 BPM

## 2023-01-23 DIAGNOSIS — M54.9 MID BACK PAIN: ICD-10-CM

## 2023-01-23 DIAGNOSIS — R23.3 EASY BRUISABILITY: ICD-10-CM

## 2023-01-23 DIAGNOSIS — F10.21 ALCOHOL DEPENDENCE IN REMISSION (H): ICD-10-CM

## 2023-01-23 DIAGNOSIS — D63.1 ANEMIA SECONDARY TO RENAL FAILURE: Primary | ICD-10-CM

## 2023-01-23 DIAGNOSIS — N18.9 ANEMIA SECONDARY TO RENAL FAILURE: Primary | ICD-10-CM

## 2023-01-23 DIAGNOSIS — E03.9 ACQUIRED HYPOTHYROIDISM: ICD-10-CM

## 2023-01-23 DIAGNOSIS — N18.4 CKD (CHRONIC KIDNEY DISEASE) STAGE 4, GFR 15-29 ML/MIN (H): ICD-10-CM

## 2023-01-23 DIAGNOSIS — M81.0 AGE RELATED OSTEOPOROSIS, UNSPECIFIED PATHOLOGICAL FRACTURE PRESENCE: ICD-10-CM

## 2023-01-23 DIAGNOSIS — I50.32 CHRONIC HEART FAILURE WITH PRESERVED EJECTION FRACTION (H): ICD-10-CM

## 2023-01-23 PROBLEM — R60.9 DEPENDENT EDEMA: Status: RESOLVED | Noted: 2022-11-09 | Resolved: 2023-01-23

## 2023-01-23 LAB
BASOPHILS # BLD AUTO: 0 10E3/UL (ref 0–0.2)
BASOPHILS NFR BLD AUTO: 0 %
EOSINOPHIL # BLD AUTO: 0.1 10E3/UL (ref 0–0.7)
EOSINOPHIL NFR BLD AUTO: 1 %
ERYTHROCYTE [DISTWIDTH] IN BLOOD BY AUTOMATED COUNT: 13.9 % (ref 10–15)
HCT VFR BLD AUTO: 29.6 % (ref 35–47)
HGB BLD-MCNC: 9.3 G/DL (ref 11.7–15.7)
LYMPHOCYTES # BLD AUTO: 3.8 10E3/UL (ref 0.8–5.3)
LYMPHOCYTES NFR BLD AUTO: 50 %
MCH RBC QN AUTO: 31.7 PG (ref 26.5–33)
MCHC RBC AUTO-ENTMCNC: 31.4 G/DL (ref 31.5–36.5)
MCV RBC AUTO: 101 FL (ref 78–100)
MONOCYTES # BLD AUTO: 0.7 10E3/UL (ref 0–1.3)
MONOCYTES NFR BLD AUTO: 9 %
NEUTROPHILS # BLD AUTO: 3.1 10E3/UL (ref 1.6–8.3)
NEUTROPHILS NFR BLD AUTO: 41 %
PLATELET # BLD AUTO: 196 10E3/UL (ref 150–450)
RBC # BLD AUTO: 2.93 10E6/UL (ref 3.8–5.2)
WBC # BLD AUTO: 7.7 10E3/UL (ref 4–11)

## 2023-01-23 PROCEDURE — 84439 ASSAY OF FREE THYROXINE: CPT | Performed by: FAMILY MEDICINE

## 2023-01-23 PROCEDURE — 36415 COLL VENOUS BLD VENIPUNCTURE: CPT | Performed by: FAMILY MEDICINE

## 2023-01-23 PROCEDURE — 86140 C-REACTIVE PROTEIN: CPT | Performed by: FAMILY MEDICINE

## 2023-01-23 PROCEDURE — 84443 ASSAY THYROID STIM HORMONE: CPT | Performed by: FAMILY MEDICINE

## 2023-01-23 PROCEDURE — 99214 OFFICE O/P EST MOD 30 MIN: CPT | Performed by: FAMILY MEDICINE

## 2023-01-23 PROCEDURE — 85025 COMPLETE CBC W/AUTO DIFF WBC: CPT | Performed by: FAMILY MEDICINE

## 2023-01-23 PROCEDURE — 80053 COMPREHEN METABOLIC PANEL: CPT | Performed by: FAMILY MEDICINE

## 2023-01-23 RX ORDER — LISINOPRIL 2.5 MG/1
2.5 TABLET ORAL DAILY
Qty: 90 TABLET | Refills: 0 | Status: SHIPPED | OUTPATIENT
Start: 2023-01-23 | End: 2023-04-21

## 2023-01-23 NOTE — PROGRESS NOTES
Assessment & Plan     (N18.9,  D63.1) Anemia secondary to renal failure  (primary encounter diagnosis)  (N18.4) CKD (chronic kidney disease) stage 4, GFR 15-29 ml/min   Plan: Comprehensive metabolic panel (BMP + Alb, Alk         Phos, ALT, AST, Total. Bili, TP), CBC with         platelets and differential, Adult Nephrology          Referral, lisinopril (ZESTRIL) 2.5 MG        tablet         Discussed risks and benefits of this medication. Follow-up in 1 month for BMP     (I50.32) Chronic heart failure with preserved ejection fraction (H)  Comment: euvolemic   Plan: Comprehensive metabolic panel (BMP + Alb, Alk         Phos, ALT, AST, Total. Bili, TP), CBC with         platelets and differential, lisinopril         (ZESTRIL) 2.5 MG tablet           (F10.21) Alcohol dependence in remission (H)  Plan: follow     (E03.9) Acquired hypothyroidism  Comment: euthyroid on replacement   Plan: TSH WITH FREE T4 REFLEX          (M81.0) Age related osteoporosis, unspecified pathological fracture presence  Plan: discussed Prolia, which she is not interested in at this time              See Patient Instructions    Return in about 1 month (around 2/23/2023) for Wellness visit.    Bren Tamez MD  [facility] is a 82 year old, presenting for the following health issues:  RECHECK (Follow-up from random bruising)      HPI   Pebbles Powell is a 82 year old female who presents with follow-up of anemia. She has ongoing easy bruising of upper extremities. Symptom onset has been unchanged for a time period of months. Severity is described as moderate. Course of her symptoms over time is unchanged.     Review of Systems   CONSTITUTIONAL: NEGATIVE for fever, chills, change in weight  INTEGUMENTARY/SKIN: bruising   EYES: NEGATIVE for vision changes or irritation  ENT/MOUTH: NEGATIVE for ear, mouth and throat problems  RESP: NEGATIVE for significant cough or SOB  CV: NEGATIVE for chest  "pain, palpitations or peripheral edema  MUSCULOSKELETAL: NEGATIVE for significant arthralgias or myalgia  NEURO: NEGATIVE for weakness, dizziness or paresthesias  HEME: NEGATIVE for bleeding problems      Objective    /60 (BP Location: Right arm, Patient Position: Sitting, Cuff Size: Adult Regular)   Pulse 75   Temp 98.6  F (37  C) (Oral)   Resp 20   Ht 1.745 m (5' 8.7\")   Wt 72.1 kg (159 lb)   SpO2 98%   BMI 23.69 kg/m    Body mass index is 23.69 kg/m .  Physical Exam   GENERAL: healthy, alert and no distress  NECK: no adenopathy, no asymmetry, masses, or scars and thyroid normal to palpation  RESP: lungs clear to auscultation - no rales, rhonchi or wheezes  CV: regular rate and rhythm, normal S1 S2, no S3 or S4, no murmur, click or rub, no peripheral edema    MS: extremities normal- no gross deformities noted  SKIN: few scattered bruises in various stages of healing of hands and upper extremities   PSYCH: mentation appears normal, affect normal/bright    Office Visit on 11/09/2022   Component Date Value Ref Range Status     Ferritin 11/09/2022 175  8 - 252 ng/mL Final     Iron 11/09/2022 49  35 - 180 ug/dL Final     Iron Binding Capacity 11/09/2022 228 (L)  240 - 430 ug/dL Final     Iron Sat Index 11/09/2022 21  15 - 46 % Final     Sodium 11/09/2022 142  133 - 144 mmol/L Final     Potassium 11/09/2022 5.1  3.4 - 5.3 mmol/L Final     Chloride 11/09/2022 114 (H)  94 - 109 mmol/L Final     Carbon Dioxide (CO2) 11/09/2022 23  20 - 32 mmol/L Final     Anion Gap 11/09/2022 5  3 - 14 mmol/L Final     Urea Nitrogen 11/09/2022 37 (H)  7 - 30 mg/dL Final     Creatinine 11/09/2022 1.81 (H)  0.52 - 1.04 mg/dL Final     Calcium 11/09/2022 8.8  8.5 - 10.1 mg/dL Final     Glucose 11/09/2022 89  70 - 99 mg/dL Final     Albumin 11/09/2022 3.3 (L)  3.4 - 5.0 g/dL Final     Phosphorus 11/09/2022 3.7  2.5 - 4.5 mg/dL Final     GFR Estimate 11/09/2022 28 (L)  >60 mL/min/1.73m2 Final    Effective December 21, 2021 eGFRcr " in adults is calculated using the 2021 CKD-EPI creatinine equation which includes age and gender (Patti et al., [facility], DOI: 10.1056/XLTIif9770294)     WBC Count 11/09/2022 8.4  4.0 - 11.0 10e3/uL Final     RBC Count 11/09/2022 3.04 (L)  3.80 - 5.20 10e6/uL Final     Hemoglobin 11/09/2022 9.4 (L)  11.7 - 15.7 g/dL Final     Hematocrit 11/09/2022 30.4 (L)  35.0 - 47.0 % Final     MCV 11/09/2022 100  78 - 100 fL Final     MCH 11/09/2022 30.9  26.5 - 33.0 pg Final     MCHC 11/09/2022 30.9 (L)  31.5 - 36.5 g/dL Final     RDW 11/09/2022 13.2  10.0 - 15.0 % Final     Platelet Count 11/09/2022 217  150 - 450 10e3/uL Final     % Neutrophils 11/09/2022 39  % Final     % Lymphocytes 11/09/2022 51  % Final     % Monocytes 11/09/2022 9  % Final     % Eosinophils 11/09/2022 1  % Final     % Basophils 11/09/2022 0  % Final     % Immature Granulocytes 11/09/2022 0  % Final     NRBCs per 100 WBC 11/09/2022 0  <1 /100 Final     Absolute Neutrophils 11/09/2022 3.3  1.6 - 8.3 10e3/uL Final     Absolute Lymphocytes 11/09/2022 4.3  0.8 - 5.3 10e3/uL Final     Absolute Monocytes 11/09/2022 0.7  0.0 - 1.3 10e3/uL Final     Absolute Eosinophils 11/09/2022 0.1  0.0 - 0.7 10e3/uL Final     Absolute Basophils 11/09/2022 0.0  0.0 - 0.2 10e3/uL Final     Absolute Immature Granulocytes 11/09/2022 0.0  <=0.4 10e3/uL Final     Absolute NRBCs 11/09/2022 0.0  10e3/uL Final     Results for orders placed or performed in visit on 01/23/23 (from the past 24 hour(s))   CBC with platelets and differential    Narrative    The following orders were created for panel order CBC with platelets and differential.  Procedure                               Abnormality         Status                     ---------                               -----------         ------                     CBC with platelets and d...[[id number]]                      In process                   Please view results for these tests on the individual orders.

## 2023-01-24 LAB
ALBUMIN SERPL-MCNC: 3.2 G/DL (ref 3.4–5)
ALP SERPL-CCNC: 70 U/L (ref 40–150)
ALT SERPL W P-5'-P-CCNC: 20 U/L (ref 0–50)
ANION GAP SERPL CALCULATED.3IONS-SCNC: 6 MMOL/L (ref 3–14)
AST SERPL W P-5'-P-CCNC: 20 U/L (ref 0–45)
BILIRUB SERPL-MCNC: 0.3 MG/DL (ref 0.2–1.3)
BUN SERPL-MCNC: 35 MG/DL (ref 7–30)
CALCIUM SERPL-MCNC: 8.7 MG/DL (ref 8.5–10.1)
CHLORIDE BLD-SCNC: 115 MMOL/L (ref 94–109)
CO2 SERPL-SCNC: 22 MMOL/L (ref 20–32)
CREAT SERPL-MCNC: 2 MG/DL (ref 0.52–1.04)
GFR SERPL CREATININE-BSD FRML MDRD: 25 ML/MIN/1.73M2
GLUCOSE BLD-MCNC: 91 MG/DL (ref 70–99)
POTASSIUM BLD-SCNC: 4.7 MMOL/L (ref 3.4–5.3)
PROT SERPL-MCNC: 6.6 G/DL (ref 6.8–8.8)
SODIUM SERPL-SCNC: 143 MMOL/L (ref 133–144)
T4 FREE SERPL-MCNC: 0.54 NG/DL (ref 0.76–1.46)
TSH SERPL DL<=0.005 MIU/L-ACNC: 83.06 MU/L (ref 0.4–4)

## 2023-01-24 RX ORDER — LEVOTHYROXINE SODIUM 137 UG/1
137 TABLET ORAL DAILY
Qty: 90 TABLET | Refills: 3 | Status: SHIPPED | OUTPATIENT
Start: 2023-01-24 | End: 2023-04-25 | Stop reason: DRUGHIGH

## 2023-01-24 NOTE — RESULT ENCOUNTER NOTE
Please call patient:  If you are taking your thyroid medication daily you need a higher dose. I have sent a prescription. Follow-up for lab tests in 4 weeks.     Your blood glucose and liver tests are fine. You have stage 4 kidney disease and chronic anemia. See the kidney specialist.     Bren Tamez MD

## 2023-01-25 ENCOUNTER — ANCILLARY PROCEDURE (OUTPATIENT)
Dept: GENERAL RADIOLOGY | Facility: CLINIC | Age: 83
End: 2023-01-25
Attending: FAMILY MEDICINE
Payer: COMMERCIAL

## 2023-01-25 ENCOUNTER — TELEPHONE (OUTPATIENT)
Dept: MULTI SPECIALTY CLINIC | Facility: CLINIC | Age: 83
End: 2023-01-25

## 2023-01-25 ENCOUNTER — OFFICE VISIT (OUTPATIENT)
Dept: FAMILY MEDICINE | Facility: CLINIC | Age: 83
End: 2023-01-25
Payer: COMMERCIAL

## 2023-01-25 VITALS
RESPIRATION RATE: 18 BRPM | SYSTOLIC BLOOD PRESSURE: 129 MMHG | WEIGHT: 160.5 LBS | HEART RATE: 66 BPM | HEIGHT: 68 IN | BODY MASS INDEX: 24.32 KG/M2 | DIASTOLIC BLOOD PRESSURE: 67 MMHG | TEMPERATURE: 97 F | OXYGEN SATURATION: 100 %

## 2023-01-25 DIAGNOSIS — E03.9 HYPOTHYROIDISM, UNSPECIFIED TYPE: ICD-10-CM

## 2023-01-25 DIAGNOSIS — M54.9 MID BACK PAIN: Primary | ICD-10-CM

## 2023-01-25 DIAGNOSIS — M47.9 OSTEOARTHRITIS OF SPINE, UNSPECIFIED SPINAL OSTEOARTHRITIS COMPLICATION STATUS, UNSPECIFIED SPINAL REGION: ICD-10-CM

## 2023-01-25 DIAGNOSIS — M41.9 SCOLIOSIS, UNSPECIFIED SCOLIOSIS TYPE, UNSPECIFIED SPINAL REGION: ICD-10-CM

## 2023-01-25 LAB — CRP SERPL-MCNC: <2.9 MG/L (ref 0–8)

## 2023-01-25 PROCEDURE — 99213 OFFICE O/P EST LOW 20 MIN: CPT | Performed by: FAMILY MEDICINE

## 2023-01-25 PROCEDURE — 72070 X-RAY EXAM THORAC SPINE 2VWS: CPT | Mod: TC | Performed by: RADIOLOGY

## 2023-01-25 ASSESSMENT — PATIENT HEALTH QUESTIONNAIRE - PHQ9: SUM OF ALL RESPONSES TO PHQ QUESTIONS 1-9: 0

## 2023-01-25 ASSESSMENT — PAIN SCALES - GENERAL: PAINLEVEL: NO PAIN (0)

## 2023-01-25 NOTE — TELEPHONE ENCOUNTER
Attempted to contact pt.  No answer. Message left to return call.    Calling to assist  Pt with scheduling an appointment with Nephrology.    Wendy Goodwin LPN

## 2023-01-25 NOTE — TELEPHONE ENCOUNTER
[facility] Call Center    Phone Message    May a detailed message be left on voicemail: yes     Reason for Call: Appointment Intake    Referring Provider Name: Bren Tamez MD  Diagnosis and/or Symptoms: CKD    Return pt being referred. Sending encounter per guidelines. Please review and follow-up with patient    Action Taken: Message routed to:  Other: [facility]    Travel Screening: Not Applicable

## 2023-01-25 NOTE — PROGRESS NOTES
"       Leno Andrea is a 82 year old , presenting for the following health issues:  Back Pain      HPI     Back pain for the past couple months       Review of Systems    patient points to mid back  No shooting pain arms or legs    Pain does not wake her up at night    Patient had  Hard time describing pain    Comes and goes    No change with activity ? Maybe some    Breathing fine    Not tired more than  Usual    CKD stage 4        Objective    /67 (BP Location: Left arm, Patient Position: Chair, Cuff Size: Adult Regular)   Pulse 66   Temp 97  F (36.1  C) (Temporal)   Resp 18   Ht 1.722 m (5' 7.8\")   Wt 72.8 kg (160 lb 8 oz)   SpO2 100%   BMI 24.55 kg/m    Body mass index is 24.55 kg/m .  Physical Exam      Full physical not done     Mentation and affect are fine    No tremor of speech or extremity    Patient points to mid and upper back when asked where pain  Is    No particular point tenderness anywhere    Range of motion of back fair    No radiculopathy     Able to get up on heels and toes okay          Xray thoracic spine done, arthritis and  Scoliosis present    Added crp to existing blood from earlier this week          ASSESSMENT / PLAN:  (M54.9) Mid back pain  (primary encounter diagnosis)  Comment: exam and xray done.  May just be soft tissue pain or related to arthritis.  Add crp to blood from earlier this week to look for systemic inflammation.  No obvious metastases seen in spine.   Plan: XR Thoracic Spine 2 Views, CRP, inflammation             (E03.9) Hypothyroidism, unspecified type  Comment: clarified labs from earlier this wee; a higher dose had been sent in due to high tsh and low T4.   Plan: as above     (M41.9) Scoliosis, unspecified scoliosis type, unspecified spinal region  Comment: likely chronic   Plan: of note patient left before I could discussed xray findings with her    (M47.9) Osteoarthritis of spine, unspecified spinal osteoarthritis complication status, unspecified " spinal region  Comment: as above   Plan: I will send result note regarding xray     Likely have patient do some  Stretching/ walking, use prn tylenol, then follow up in clinic.      I reviewed the patient's medications, allergies, medical history, family history, and social history.    John Nguyen MD

## 2023-01-26 ENCOUNTER — TELEPHONE (OUTPATIENT)
Dept: FAMILY MEDICINE | Facility: CLINIC | Age: 83
End: 2023-01-26
Payer: COMMERCIAL

## 2023-01-26 NOTE — TELEPHONE ENCOUNTER
Please call patient:  If you are taking your thyroid medication daily you need a higher dose. I have sent a prescription. Follow-up for lab tests in 4 weeks.      Your blood glucose and liver tests are fine. You have stage 4 kidney disease and chronic anemia. See the kidney specialist.      Bren Tamez MD   Written by Bren Tamez MD on 1/24/2023  9:13 AM CST

## 2023-01-26 NOTE — TELEPHONE ENCOUNTER
Left message on answering machine for patient to call back to the nurse at [phone].    Lolita Dumont RN  [facility]

## 2023-01-26 NOTE — RESULT ENCOUNTER NOTE
I was going to show you the xrays before you left. Radiology interpretation is still pending.    I mainly saw arthritis and some scoliosis/ curvature of the spine.      I also added a blood test to the blood from earlier this week.    Your CRP ( c reactive protein ) is normal.  This is a test of inflammation.  You do not have systemic inflammation.    I advise you work on easy stretching/ range of motion  exercises and take tylenol as needed for pain.  If the back pain is not improving in a few weeks, follow up in clinic.    John Nguyen MD

## 2023-01-26 NOTE — LETTER
"February 1, 2023      Pebbles Powell  [address]    [place] [id number]        Dear Pebbles,     We attempted to reach out to you a few times regarding a message from ru provider.     Per your provider:   \"If you are taking your thyroid medication daily you need a higher dose. I have sent a prescription. Follow-up for lab tests in 4 weeks.      Your blood glucose and liver tests are fine. You have stage 4 kidney disease and chronic anemia. See the kidney specialist.      Bren Tamez MD\"      Please call us at [phone].    Thanks,  [facility]             "

## 2023-01-27 NOTE — TELEPHONE ENCOUNTER
2nd attempt to contact pt.   No answer.  Message left to return call.  Calling to assist pt with scheduling a Return appt with Nephrology    Wendy Goodwin LPN

## 2023-01-30 NOTE — TELEPHONE ENCOUNTER
3rd attempt to contact pt.  No answer.  Message left on VM to return call.    [facility] and schedule a Return appt with Nephrology.    Wendy Goodwin LPN

## 2023-01-30 NOTE — TELEPHONE ENCOUNTER
Attempted 2nd call, left vm asking call back. [place] [employer] [place] message sent to patient as well.     Thanks,  ERIC Robles  [facility]

## 2023-02-01 NOTE — TELEPHONE ENCOUNTER
Unable to reach patient.   Team- when able please print cued letter and mail to address on file.     Thanks,  ERIC Robles  [facility]

## 2023-02-13 ENCOUNTER — OFFICE VISIT (OUTPATIENT)
Dept: FAMILY MEDICINE | Facility: CLINIC | Age: 83
End: 2023-02-13
Payer: COMMERCIAL

## 2023-02-13 VITALS
RESPIRATION RATE: 16 BRPM | WEIGHT: 157 LBS | HEIGHT: 69 IN | TEMPERATURE: 98.5 F | HEART RATE: 82 BPM | OXYGEN SATURATION: 100 % | SYSTOLIC BLOOD PRESSURE: 138 MMHG | DIASTOLIC BLOOD PRESSURE: 69 MMHG | BODY MASS INDEX: 23.25 KG/M2

## 2023-02-13 DIAGNOSIS — J01.90 ACUTE SINUSITIS, RECURRENCE NOT SPECIFIED, UNSPECIFIED LOCATION: Primary | ICD-10-CM

## 2023-02-13 PROCEDURE — U0005 INFEC AGEN DETEC AMPLI PROBE: HCPCS | Performed by: FAMILY MEDICINE

## 2023-02-13 PROCEDURE — 99213 OFFICE O/P EST LOW 20 MIN: CPT | Mod: CS | Performed by: FAMILY MEDICINE

## 2023-02-13 PROCEDURE — U0003 INFECTIOUS AGENT DETECTION BY NUCLEIC ACID (DNA OR RNA); SEVERE ACUTE RESPIRATORY SYNDROME CORONAVIRUS 2 (SARS-COV-2) (CORONAVIRUS DISEASE [COVID-19]), AMPLIFIED PROBE TECHNIQUE, MAKING USE OF HIGH THROUGHPUT TECHNOLOGIES AS DESCRIBED BY CMS-2020-01-R: HCPCS | Performed by: FAMILY MEDICINE

## 2023-02-13 NOTE — PROGRESS NOTES
"  Assessment & Plan     (J01.90) Acute sinusitis, recurrence not specified, unspecified location  (primary encounter diagnosis)  Plan: amoxicillin-clavulanate (AUGMENTIN) 875-125 MG         tablet, Symptomatic COVID-19 Virus         (Coronavirus) by PCR Nose        Call or return to clinic as needed if these symptoms worsen or fail to improve as anticipated.                See Patient Instructions    Return in about 3 months (around 5/13/2023) for Wellness visit.    Bren Tamez MD  [facility] LIANNE Andrea is a 82 year old, presenting for the following health issues:  Nasal Congestion (Runny nose, Phlegm)      HPI     Acute Illness  Acute illness concerns: Runny nose, Congestion  Onset/Duration: About 2 weeks  Symptoms:  Fever: No  Chills/Sweats: No  Headache (location?): No  Sinus Pressure: No  Conjunctivitis:  No  Ear Pain: no  Rhinorrhea: YES  Congestion: YES  Sore Throat: No  Cough: no  Wheeze: No  Decreased Appetite: No  Nausea: No  Vomiting: No  Diarrhea: YES- Black  Dysuria/Freq.: No  Dysuria or Hematuria: No  Fatigue/Achiness: No  Sick/Strep Exposure: No  Therapies tried and outcome: None      Review of Systems         Objective    /69 (BP Location: Right arm, Patient Position: Sitting, Cuff Size: Adult Regular)   Pulse 82   Temp 98.5  F (36.9  C) (Oral)   Resp 16   Ht 1.742 m (5' 8.58\")   Wt 71.2 kg (157 lb)   SpO2 100%   BMI 23.47 kg/m    Body mass index is 23.47 kg/m .  Physical Exam   GENERAL: healthy, alert and no distress  EYES: Eyes grossly normal to inspection, PERRL and conjunctivae and sclerae normal  HENT: ear canals and TM's normal, nose and mouth without ulcers or lesions  NECK: no adenopathy, no asymmetry, masses, or scars and thyroid normal to palpation  RESP: lungs clear to auscultation - no rales, rhonchi or wheezes  CV: regular rate and rhythm, normal S1 S2, no S3 or S4, no murmur, click or rub   MS: no gross musculoskeletal defects noted, no " edema  PSYCH: mentation appears normal, affect normal/bright    Office Visit on 01/23/2023   Component Date Value Ref Range Status     TSH 01/23/2023 83.06 (H)  0.40 - 4.00 mU/L Final     Sodium 01/23/2023 143  133 - 144 mmol/L Final     Potassium 01/23/2023 4.7  3.4 - 5.3 mmol/L Final     Chloride 01/23/2023 115 (H)  94 - 109 mmol/L Final     Carbon Dioxide (CO2) 01/23/2023 22  20 - 32 mmol/L Final     Anion Gap 01/23/2023 6  3 - 14 mmol/L Final     Urea Nitrogen 01/23/2023 35 (H)  7 - 30 mg/dL Final     Creatinine 01/23/2023 2.00 (H)  0.52 - 1.04 mg/dL Final     Calcium 01/23/2023 8.7  8.5 - 10.1 mg/dL Final     Glucose 01/23/2023 91  70 - 99 mg/dL Final     Alkaline Phosphatase 01/23/2023 70  40 - 150 U/L Final     AST 01/23/2023 20  0 - 45 U/L Final     ALT 01/23/2023 20  0 - 50 U/L Final     Protein Total 01/23/2023 6.6 (L)  6.8 - 8.8 g/dL Final     Albumin 01/23/2023 3.2 (L)  3.4 - 5.0 g/dL Final     Bilirubin Total 01/23/2023 0.3  0.2 - 1.3 mg/dL Final     GFR Estimate 01/23/2023 25 (L)  >60 mL/min/1.73m2 Final    eGFR calculated using 2021 CKD-EPI equation.     WBC Count 01/23/2023 7.7  4.0 - 11.0 10e3/uL Final     RBC Count 01/23/2023 2.93 (L)  3.80 - 5.20 10e6/uL Final     Hemoglobin 01/23/2023 9.3 (L)  11.7 - 15.7 g/dL Final     Hematocrit 01/23/2023 29.6 (L)  35.0 - 47.0 % Final     MCV 01/23/2023 101 (H)  78 - 100 fL Final     MCH 01/23/2023 31.7  26.5 - 33.0 pg Final     MCHC 01/23/2023 31.4 (L)  31.5 - 36.5 g/dL Final     RDW 01/23/2023 13.9  10.0 - 15.0 % Final     Platelet Count 01/23/2023 196  150 - 450 10e3/uL Final     % Neutrophils 01/23/2023 41  % Final     % Lymphocytes 01/23/2023 50  % Final     % Monocytes 01/23/2023 9  % Final     % Eosinophils 01/23/2023 1  % Final     % Basophils 01/23/2023 0  % Final     Absolute Neutrophils 01/23/2023 3.1  1.6 - 8.3 10e3/uL Final     Absolute Lymphocytes 01/23/2023 3.8  0.8 - 5.3 10e3/uL Final     Absolute Monocytes 01/23/2023 0.7  0.0 - 1.3 10e3/uL  Final     Absolute Eosinophils 01/23/2023 0.1  0.0 - 0.7 10e3/uL Final     Absolute Basophils 01/23/2023 0.0  0.0 - 0.2 10e3/uL Final     Free T4 01/23/2023 0.54 (L)  0.76 - 1.46 ng/dL Final     CRP Inflammation 01/23/2023 <2.9  0.0 - 8.0 mg/L Final     No results found for this or any previous visit (from the past 24 hour(s)).

## 2023-02-13 NOTE — PATIENT INSTRUCTIONS
Get the shingles vaccine, called Shingrix (given as 2 shots, 2 to 6 months apart), even if you have already had the Zostavax vaccine. Discuss getting the Shingix vaccine from your pharmacist, or schedule an ancillary shot visit here. Some insurances do not cover the cost of these vaccines.       Instructions for Patients      What are the symptoms of COVID-19?  Symptoms can include fever, cough, shortness of breath, chills, headache, muscle pain sore throat, fatigue, runny or stuffy nose, and loss of taste and smell. Other less common symptoms include nausea, vomiting, or diarrhea (watery stools).    Know when to call 911. Emergency warning signs include:  Trouble breathing or shortness of breath  Pain or pressure in the chest that doesn't go away  Feeling confused like you haven't felt before, or not being able to wake up  Bluish-colored lips or face    How can I take care of myself?  Get lots of rest. Drink extra fluids (unless a doctor has told you not to).  Take Tylenol (acetaminophen) for fever or pain. If you have liver or kidney problems, ask your family doctor if it's okay to take Tylenol   Adults can take either:   650 mg (two 325 mg pills) every 4 to 6 hours, or...   1,000 mg (two 500 mg pills) every 8 hours as needed.  Note: Don't take more than 3,000 mg in one day. Acetaminophen is found in many medicines (both prescribed and over the counter medicines). Read all labels to be sure you don't take too much.  For children, check the Tylenol bottle for the right dose. The dose is based on the child's age or weight.  Take over the counter medicines for your symptoms as needed. Talk to your pharmacist.  If you have other health problems (like cancer, heart failure, an organ transplant, or severe kidney disease): Call your specialty clinic if you don't feel better in the next 2 days.    Where can I get more information?   [facility] COVID-19 Resource Hub: [email or web address]/   CDC Quarantine  & Isolation: https://www.cdc.gov/coronavirus/2019-ncov/your-health/quarantine-isolation.html   CDC - What to Do If You're Sick: https://www.cdc.gov/coronavirus/2019-ncov/if-you-are-sick/index.html  Learn about the ACTIV-6 Clinical Trial: activ6.[facility] or call (250)-847-1763

## 2023-02-14 LAB — SARS-COV-2 RNA RESP QL NAA+PROBE: NEGATIVE

## 2023-03-13 ENCOUNTER — TELEPHONE (OUTPATIENT)
Dept: FAMILY MEDICINE | Facility: CLINIC | Age: 83
End: 2023-03-13
Payer: COMMERCIAL

## 2023-03-13 NOTE — TELEPHONE ENCOUNTER
Called patient to triage black feces.    No answer; VM box is full.     Patient does take iron; last CBC hgb 9.3 in end of January.     Jia Ramirez RN  [facility]

## 2023-03-13 NOTE — TELEPHONE ENCOUNTER
Patient came by stating that their feces are black and they would like to speak to a nurse as soon as possible. She would like to speak to someone about her symptoms.She states it has been going on for about a month and half and the symptoms haven't subsided. She would like a call back as soon as possible at: [phone].    Thank You,    Nj Herrera  Patient Rep

## 2023-03-15 NOTE — TELEPHONE ENCOUNTER
Attempted to call patient but no answer.  VM box full. Unable to leave VM    [employer] message sent    Ty Rodriguez RN  [facility]

## 2023-03-16 NOTE — TELEPHONE ENCOUNTER
3rd attempt. Called patient. Mailbox full and unable to accept messages.  Closing since multiple attempts have been made to reach patient with no answer or response.    Angela Hayward RN   [facility]

## 2023-03-28 ENCOUNTER — TELEPHONE (OUTPATIENT)
Dept: FAMILY MEDICINE | Facility: CLINIC | Age: 83
End: 2023-03-28
Payer: COMMERCIAL

## 2023-03-28 NOTE — TELEPHONE ENCOUNTER
Patient Quality Outreach    Patient is due for the following:   Physical Annual Wellness Visit    Next Steps:   Schedule a Annual Wellness Visit    Type of outreach:    Sent [facility] message.      Questions for provider review:    None     Claudia Jordan [facility]  Chart routed to Care Team.

## 2023-04-17 ENCOUNTER — OFFICE VISIT (OUTPATIENT)
Dept: OPTOMETRY | Facility: CLINIC | Age: 83
End: 2023-04-17
Payer: COMMERCIAL

## 2023-04-17 DIAGNOSIS — H52.223 REGULAR ASTIGMATISM OF BOTH EYES: ICD-10-CM

## 2023-04-17 DIAGNOSIS — H04.123 DRY EYES: ICD-10-CM

## 2023-04-17 DIAGNOSIS — Z01.01 ENCOUNTER FOR EXAMINATION OF EYES AND VISION WITH ABNORMAL FINDINGS: Primary | ICD-10-CM

## 2023-04-17 DIAGNOSIS — H52.13 MYOPIA OF BOTH EYES: ICD-10-CM

## 2023-04-17 DIAGNOSIS — Z96.1 PSEUDOPHAKIA OF BOTH EYES: ICD-10-CM

## 2023-04-17 DIAGNOSIS — H52.4 PRESBYOPIA: ICD-10-CM

## 2023-04-17 PROCEDURE — 92015 DETERMINE REFRACTIVE STATE: CPT | Performed by: OPTOMETRIST

## 2023-04-17 PROCEDURE — 92014 COMPRE OPH EXAM EST PT 1/>: CPT | Performed by: OPTOMETRIST

## 2023-04-17 ASSESSMENT — CUP TO DISC RATIO
OS_RATIO: 0.25
OD_RATIO: 0.15

## 2023-04-17 ASSESSMENT — REFRACTION_WEARINGRX
OD_SPHERE: -1.25
OS_ADD: +2.75
SPECS_TYPE: PAL
OD_ADD: +2.75
OS_SPHERE: -1.25
OS_AXIS: 093
OD_AXIS: 095
OS_CYLINDER: +2.75
OD_CYLINDER: +2.75

## 2023-04-17 ASSESSMENT — VISUAL ACUITY
METHOD: SNELLEN - LINEAR
OD_SC: 20/60
OS_CC: 20/20-2
OD_CC: 20/40
OD_SC+: +1
OS_SC+: -2
OS_SC: 20/50
OS_SC: 20/200
OD_CC+: -2
CORRECTION_TYPE: GLASSES
OD_SC: 20/200
OS_CC: 20/25
OD_CC: 20/25

## 2023-04-17 ASSESSMENT — CONF VISUAL FIELD
OD_INFERIOR_TEMPORAL_RESTRICTION: 0
OS_NORMAL: 1
OS_INFERIOR_TEMPORAL_RESTRICTION: 0
OS_INFERIOR_NASAL_RESTRICTION: 0
OS_SUPERIOR_NASAL_RESTRICTION: 0
OD_SUPERIOR_TEMPORAL_RESTRICTION: 0
METHOD: COUNTING FINGERS
OD_INFERIOR_NASAL_RESTRICTION: 0
OD_SUPERIOR_NASAL_RESTRICTION: 0
OS_SUPERIOR_TEMPORAL_RESTRICTION: 0
OD_NORMAL: 1

## 2023-04-17 ASSESSMENT — REFRACTION_MANIFEST
OS_SPHERE: -1.25
OS_CYLINDER: +2.50
OD_SPHERE: -1.25
OD_ADD: +2.75
OS_ADD: +2.75
OS_AXIS: 085
OD_CYLINDER: +2.75
OD_AXIS: 088

## 2023-04-17 ASSESSMENT — SLIT LAMP EXAM - LIDS
COMMENTS: 1+ DERMATOCHALASIS, 1+ BLEPHARITIS
COMMENTS: 1+ DERMATOCHALASIS, 1+ BLEPHARITIS

## 2023-04-17 ASSESSMENT — EXTERNAL EXAM - LEFT EYE: OS_EXAM: NORMAL

## 2023-04-17 ASSESSMENT — TONOMETRY
OD_IOP_MMHG: 13
OS_IOP_MMHG: 11
IOP_METHOD: APPLANATION

## 2023-04-17 ASSESSMENT — EXTERNAL EXAM - RIGHT EYE: OD_EXAM: NORMAL

## 2023-04-17 NOTE — LETTER
4/17/2023         RE: Pebbles Powell  [address]  [place] [address]        Dear Colleague,    Thank you for referring your patient, Pebbles Powell, to the [facility]. Please see a copy of my visit note below.    Chief Complaint   Patient presents with     Eye Pain     Annual Eye Exam      -Pt had slight discomfort in left eye (temporal) starting this morning - states her eye was slightly pink and irritated but used one eyedrop and it is completely fine after that. No symptoms remaining.     Last Eye Exam: 3/10/2022  Dilated Previously: Yes, side effects of dilation explained today    What are you currently using to see?  Glasses - PAL's - wears full time        Distance Vision Acuity: Satisfied with vision    Near Vision Acuity: Satisfied with vision while reading and using computer with glasses    Eye Comfort: good overall - only complaint listed above   Do you use eye drops? : Yes: Systane PRN  Occupation or Hobbies: Retired    Jackie Anjel       Medical, surgical and family histories reviewed and updated 4/17/2023.       OBJECTIVE: See Ophthalmology exam    ASSESSMENT:    ICD-10-CM    1. Encounter for examination of eyes and vision with abnormal findings  Z01.01       2. Pseudophakia of both eyes  Z96.1       3. Dry eyes  H04.123       4. Myopia of both eyes  H52.13       5. Regular astigmatism of both eyes  H52.223       6. Presbyopia  H52.4           PLAN:     Patient Instructions   Irritation left eye likely due to dryness. Continue use of artificial tears up to 4x daily for dry eye relief.     Updated glasses prescription provided today.   Allow 2 weeks to adapt to the new glasses.     Return in 1 year for a comprehensive eye exam, or sooner if needed.      The effects of the dilating drops last for 4- 6 hours.  You will be more sensitive to light and vision will be blurry up close.  Mydriatic sunglasses were given if needed.     Jonas Cruz, OD  [facility]  Lizbeth Reaves  [address] [place] [address]  [place]  [address]    [phone]            Again, thank you for allowing me to participate in the care of your patient.        Sincerely,        Jonas Cruz OD

## 2023-04-17 NOTE — PROGRESS NOTES
Chief Complaint   Patient presents with     Eye Pain     Annual Eye Exam      -Pt had slight discomfort in left eye (temporal) starting this morning - states her eye was slightly pink and irritated but used one eyedrop and it is completely fine after that. No symptoms remaining.     Last Eye Exam: 3/10/2022  Dilated Previously: Yes, side effects of dilation explained today    What are you currently using to see?  Glasses - PAL's - wears full time        Distance Vision Acuity: Satisfied with vision    Near Vision Acuity: Satisfied with vision while reading and using computer with glasses    Eye Comfort: good overall - only complaint listed above   Do you use eye drops? : Yes: Systane PRN  Occupation or Hobbies: Retired    Jackie Hobbs       Medical, surgical and family histories reviewed and updated 4/17/2023.       OBJECTIVE: See Ophthalmology exam    ASSESSMENT:    ICD-10-CM    1. Encounter for examination of eyes and vision with abnormal findings  Z01.01       2. Pseudophakia of both eyes  Z96.1       3. Dry eyes  H04.123       4. Myopia of both eyes  H52.13       5. Regular astigmatism of both eyes  H52.223       6. Presbyopia  H52.4           PLAN:     Patient Instructions   Irritation left eye likely due to dryness. Continue use of artificial tears up to 4x daily for dry eye relief.     Updated glasses prescription provided today.   Allow 2 weeks to adapt to the new glasses.     Return in 1 year for a comprehensive eye exam, or sooner if needed.      The effects of the dilating drops last for 4- 6 hours.  You will be more sensitive to light and vision will be blurry up close.  Mydriatic sunglasses were given if needed.     Jonas Cruz, OD  [facility]  [address] [place] [address]. [place]  [place]  [address]    [phone]

## 2023-04-17 NOTE — PATIENT INSTRUCTIONS
Irritation left eye likely due to dryness. Continue use of artificial tears up to 4x daily for dry eye relief.     Updated glasses prescription provided today.   Allow 2 weeks to adapt to the new glasses.     Return in 1 year for a comprehensive eye exam, or sooner if needed.      The effects of the dilating drops last for 4- 6 hours.  You will be more sensitive to light and vision will be blurry up close.  Mydriatic sunglasses were given if needed.     Jonas Cruz, OD  [facility] [place]  [address] [place] [address]. [place]  [place]  [address]    [phone]

## 2023-04-21 DIAGNOSIS — N18.4 CKD (CHRONIC KIDNEY DISEASE) STAGE 4, GFR 15-29 ML/MIN (H): ICD-10-CM

## 2023-04-21 DIAGNOSIS — I50.32 CHRONIC HEART FAILURE WITH PRESERVED EJECTION FRACTION (H): ICD-10-CM

## 2023-04-21 RX ORDER — LISINOPRIL 2.5 MG/1
TABLET ORAL
Qty: 90 TABLET | Refills: 3 | Status: SHIPPED | OUTPATIENT
Start: 2023-04-21

## 2023-04-22 ENCOUNTER — HEALTH MAINTENANCE LETTER (OUTPATIENT)
Age: 83
End: 2023-04-22

## 2023-04-24 ENCOUNTER — OFFICE VISIT (OUTPATIENT)
Dept: FAMILY MEDICINE | Facility: CLINIC | Age: 83
End: 2023-04-24
Payer: COMMERCIAL

## 2023-04-24 VITALS
OXYGEN SATURATION: 100 % | SYSTOLIC BLOOD PRESSURE: 121 MMHG | WEIGHT: 161.6 LBS | HEART RATE: 79 BPM | TEMPERATURE: 98.2 F | HEIGHT: 69 IN | BODY MASS INDEX: 23.93 KG/M2 | DIASTOLIC BLOOD PRESSURE: 69 MMHG

## 2023-04-24 DIAGNOSIS — E03.9 ACQUIRED HYPOTHYROIDISM: ICD-10-CM

## 2023-04-24 DIAGNOSIS — Z23 NEED FOR SHINGLES VACCINE: ICD-10-CM

## 2023-04-24 DIAGNOSIS — R23.3 EASY BRUISABILITY: ICD-10-CM

## 2023-04-24 DIAGNOSIS — N18.9 ANEMIA SECONDARY TO RENAL FAILURE: ICD-10-CM

## 2023-04-24 DIAGNOSIS — Z00.00 ENCOUNTER FOR MEDICARE ANNUAL WELLNESS EXAM: ICD-10-CM

## 2023-04-24 DIAGNOSIS — D63.1 ANEMIA SECONDARY TO RENAL FAILURE: ICD-10-CM

## 2023-04-24 DIAGNOSIS — M81.0 AGE RELATED OSTEOPOROSIS, UNSPECIFIED PATHOLOGICAL FRACTURE PRESENCE: ICD-10-CM

## 2023-04-24 DIAGNOSIS — R19.5 DARK STOOLS: Primary | ICD-10-CM

## 2023-04-24 DIAGNOSIS — J30.0 VASOMOTOR RHINITIS: ICD-10-CM

## 2023-04-24 DIAGNOSIS — Z86.0100 HISTORY OF COLONIC POLYPS: ICD-10-CM

## 2023-04-24 DIAGNOSIS — N18.4 CKD (CHRONIC KIDNEY DISEASE) STAGE 4, GFR 15-29 ML/MIN (H): ICD-10-CM

## 2023-04-24 LAB
ERYTHROCYTE [DISTWIDTH] IN BLOOD BY AUTOMATED COUNT: 13.7 % (ref 10–15)
HCT VFR BLD AUTO: 29 % (ref 35–47)
HGB BLD-MCNC: 9 G/DL (ref 11.7–15.7)
HOLD SPECIMEN: NORMAL
INR PPP: 1.01 (ref 0.85–1.15)
MCH RBC QN AUTO: 32 PG (ref 26.5–33)
MCHC RBC AUTO-ENTMCNC: 31 G/DL (ref 31.5–36.5)
MCV RBC AUTO: 103 FL (ref 78–100)
PLATELET # BLD AUTO: 191 10E3/UL (ref 150–450)
RBC # BLD AUTO: 2.81 10E6/UL (ref 3.8–5.2)
T4 FREE SERPL-MCNC: 0.74 NG/DL (ref 0.76–1.46)
TSH SERPL DL<=0.005 MIU/L-ACNC: 23.06 MU/L (ref 0.4–4)
WBC # BLD AUTO: 5.9 10E3/UL (ref 4–11)

## 2023-04-24 PROCEDURE — 82728 ASSAY OF FERRITIN: CPT | Performed by: FAMILY MEDICINE

## 2023-04-24 PROCEDURE — 85610 PROTHROMBIN TIME: CPT | Performed by: FAMILY MEDICINE

## 2023-04-24 PROCEDURE — 99213 OFFICE O/P EST LOW 20 MIN: CPT | Mod: 25 | Performed by: FAMILY MEDICINE

## 2023-04-24 PROCEDURE — 83540 ASSAY OF IRON: CPT | Performed by: FAMILY MEDICINE

## 2023-04-24 PROCEDURE — G0439 PPPS, SUBSEQ VISIT: HCPCS | Performed by: FAMILY MEDICINE

## 2023-04-24 PROCEDURE — 84443 ASSAY THYROID STIM HORMONE: CPT | Performed by: FAMILY MEDICINE

## 2023-04-24 PROCEDURE — 83550 IRON BINDING TEST: CPT | Performed by: FAMILY MEDICINE

## 2023-04-24 PROCEDURE — 36415 COLL VENOUS BLD VENIPUNCTURE: CPT | Performed by: FAMILY MEDICINE

## 2023-04-24 PROCEDURE — 84439 ASSAY OF FREE THYROXINE: CPT | Performed by: FAMILY MEDICINE

## 2023-04-24 PROCEDURE — 85027 COMPLETE CBC AUTOMATED: CPT | Performed by: FAMILY MEDICINE

## 2023-04-24 RX ORDER — IPRATROPIUM BROMIDE 42 UG/1
2 SPRAY, METERED NASAL 4 TIMES DAILY
Qty: 15 ML | Refills: 11 | Status: SHIPPED | OUTPATIENT
Start: 2023-04-24

## 2023-04-24 NOTE — PROGRESS NOTES
Assessment & Plan     (R19.5) Dark stools  (primary encounter diagnosis)  (Z86.010) History of colonic polyps  Comment: Differential diagnoses would include: use of recent Pepto Bismol over-the-counter; melena   Plan: CBC with Platelets and Reflex to Iron Studies        Patient refuses colonoscopy; discussed possibility of cancer or other lifestyle strategies threatening condition; follow-up pending results     (E03.9) Acquired hypothyroidism  Comment: euthyroid on replacement   Plan: TSH with free T4 reflex          (Z00.00) Encounter for Medicare annual wellness exam  Plan: PRIMARY CARE FOLLOW-UP SCHEDULING        Memory screening refused by patient - follow     (N18.4) CKD (chronic kidney disease) stage 4, GFR 15-29 ml/min (H)  Comment: lost to follow-up with nephrology   Plan: OFFICE/OUTPT VISIT,EST,LEVL IV, CBC with         Platelets and Reflex to Iron Studies          (M81.0) Age related osteoporosis, unspecified pathological fracture presence  Comment: untreated   Plan: DX Hip/Pelvis/Spine, OFFICE/OUTPT         VISIT,EST,LEVL IV          (J30.0) Vasomotor rhinitis  Plan: ipratropium (ATROVENT) 0.06 % nasal spray,         OFFICE/OUTPT VISIT,EST,LEVL IV          See Patient Instructions    Bren Tamez MD  [facility]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 82 year old, presenting for the following health issues:  Rectal Problem (Black stool yesterday) and Physical        4/24/2023     9:23 AM   Additional Questions   Roomed by Lyla PLASENCIA     Concern - Blood in stool  Onset: Yesterday had a black stool  Description: 1 black stool yesterday  Intensity: moderate  Progression of Symptoms:  same  Accompanying Signs & Symptoms: None  Previous history of similar problem: Yes  Precipitating factors:        Worsened by:   Alleviating factors:        Improved by:   Therapies tried and outcome:  none     Annual Wellness Visit     Patient has been advised of split billing requirements and indicates  "understanding: Yes     Are you in the first 12 months of your Medicare Part B coverage?  No    Physical Health:    In general, how would you rate your overall physical health? excellent    Outside of work, how many days during the week do you exercise?none    Outside of work, approximately how many minutes a day do you exercise?not applicable    If you drink alcohol do you typically have >3 drinks per day or >7 drinks per week? No    Do you usually eat at least 4 servings of fruit and vegetables a day, include whole grains & fiber and avoid regularly eating high fat or \"junk\" foods? No    Do you have any problems taking medications regularly? No    Do you have any side effects from medications? none    Needs assistance for the following daily activities: no assistance needed    Which of the following safety concerns are present in your home?  none identified     Hearing impairment: No    In the past 6 months, have you been bothered by leaking of urine? yes    Mental Health:    In general, how would you rate your overall mental or emotional health? good  PHQ-2 Score:      Do you feel safe in your environment? Yes    Have you ever done Advance Care Planning? (For example, a Health Directive, POLST, or a discussion with a medical provider or your loved ones about your wishes)? Yes, advance care planning is on file.    Fall risk:  Fallen 2 or more times in the past year?: No  Any fall with injury in the past year?: No    Cognitive Screenin) Repeat 3 items (Leader, Season, Table)     2) Clock draw:   refused by patient   3) 3 item recall:   Results: refused by patient     Mini-CogTM Bhumika Guerrero. Licensed by the author for use in [facility]; reprinted with permission (paras@.[facility]). All rights reserved.      Do you have sleep apnea, excessive snoring or daytime drowsiness?: yes    Social History     Tobacco Use     Smoking status: Former     Packs/day: 1.50     Years: 50.00     Pack years: 75.00 " "    Types: Cigarettes     Quit date: 8/20/2007     Years since quitting: 15.6     Smokeless tobacco: Never   Vaping Use     Vaping status: Never Used   Substance Use Topics     Alcohol use: No     Alcohol/week: 0.0 standard drinks of alcohol     Comment: Alcoholic, treatment in 1980 and 1988 6/21/2021    11:28 AM   Alcohol Use   Prescreen: >3 drinks/day or >7 drinks/week? No     Do you have a current opioid prescription? No  Do you use any other controlled substances or medications that are not prescribed by a provider? None     Current providers sharing in care for this patient include:   Patient Care Team:  Bren Tamez MD as PCP - General (Family Medicine)  Genoveva Woods PA-C as Physician Assistant (Dermatology)  Antonio Nguyen MD as Assigned Surgical Provider  Santino Landers MD as Assigned Musculoskeletal Provider  Bren Tamez MD as Assigned PCP    Patient has been advised of split billing requirements and indicates understanding: Yes    I have reviewed Opioid Use Disorder and Substance Use Disorder risk factors and made any needed referrals.         Review of Systems   CONSTITUTIONAL: NEGATIVE for fever, chills, change in weight  ENT/MOUTH: NEGATIVE for ear, mouth and throat problems  RESP: NEGATIVE for significant cough or SOB  CV: NEGATIVE for chest pain, palpitations or peripheral edema  GI: NEGATIVE for constipation, diarrhea, hematochezia, jaundice, nausea, poor appetite, vomiting and weight loss  MUSCULOSKELETAL: NEGATIVE for significant arthralgias or myalgia  NEURO: NEGATIVE for memory problems      Objective    /69 (BP Location: Left arm, Patient Position: Sitting, Cuff Size: Adult Regular)   Pulse 79   Temp 98.2  F (36.8  C) (Oral)   Ht 1.742 m (5' 8.58\")   Wt 73.3 kg (161 lb 9.6 oz)   SpO2 100%   BMI 24.16 kg/m    Body mass index is 24.16 kg/m .  Physical Exam   GENERAL: healthy, alert and no distress  EYES: Eyes grossly normal " to inspection, PERRL and conjunctivae and sclerae normal  NECK: no adenopathy, no asymmetry, masses, or scars and thyroid normal to palpation  RESP: lungs clear to auscultation - no rales, rhonchi or wheezes  CV: regular rate and rhythm, normal S1 S2, no S3 or S4, no murmur, click or rub, no peripheral edema    ABDOMEN: soft, nontender, no hepatosplenomegaly, no masses and bowel sounds normal  MS: no gross musculoskeletal defects noted, no edema  PSYCH: mentation appears normal, affect normal/bright    Office Visit on 02/13/2023   Component Date Value Ref Range Status     SARS CoV2 PCR 02/13/2023 Negative  Negative Final    NEGATIVE: SARS-CoV-2 (COVID-19) RNA not detected, presumed negative.     No results found for this or any previous visit (from the past 24 hour(s)).                  She is at risk for lack of exercise and has been provided with information to increase physical activity for the benefit of her well-being.  The patient was counseled and encouraged to consider modifying their diet and eating habits. She was provided with information on recommended healthy diet options.  Information on urinary incontinence and treatment options given to patient.

## 2023-04-24 NOTE — PATIENT INSTRUCTIONS
Patient Education   Personalized Prevention Plan  You are due for the preventive services outlined below.  Your care team is available to assist you in scheduling these services.  If you have already completed any of these items, please share that information with your care team to update in your medical record.  Health Maintenance Due   Topic Date Due     Zoster (Shingles) Vaccine (2 of 3) 03/19/2008     Flu Vaccine (1) Never done     Kidney Microalbumin Urine Test  11/02/2022     Heart Failure Action Plan  03/18/2023     Hemoglobin  07/23/2023       Exercise for a Healthier Heart  You may wonder how you can improve the health of your heart. If you re thinking about exercise, you re on the right track. You don t need to become an athlete. But you do need a certain amount of brisk exercise to help strengthen your heart. If you have been diagnosed with a heart condition, your healthcare provider may advise exercise to help your condition. To help make exercise a habit, choose safe, fun activities.      Exercise with a friend. When activity is fun, you're more likely to stick with it.     Before you start  Check with your healthcare provider before starting an exercise program. This is especially important if you haven't been active for a while. It's also important if you have a long-term (chronic) health problem such as heart disease, diabetes, or obesity. Also check with your provider if you're at high risk for having these problems.   Why exercise?  Exercising regularly offers many healthy rewards. It can help you do all of these:     Improve your blood cholesterol level to help prevent further heart trouble.    Lower your blood pressure to help prevent a stroke or heart attack.    Control diabetes or reduce your risk of getting this disease.    Improve your heart and lung function.    Reach and stay at a healthy weight.    Make your muscles stronger so you can stay active.    Prevent falls and fractures by  slowing the loss of bone mass (osteoporosis).    Manage stress better.    Improve your sense of self and your body image.  Exercise tips      Ease into your routine. Set small goals. Then build on them. Talk with your healthcare provider first before starting an exercise routine if you're not sure what your activity level should be.    Exercise on most days. Aim for a total of at least 150 minutes (2 hours and 30 minutes) or more of moderate-intensity aerobic activity each week. You could also do 75 minutes (1 hour and 15 minutes) or more of vigorous-intensity aerobic activity each week. Or try for a combination of both. Moderate activity means that you breathe heavier and your heart rate increases, but you can still talk. Think about doing at least 30 minutes of moderate exercise, 5 times a week. It's OK to work up to the 30-minute period over time. Examples of moderate-intensity activity are brisk walking, gardening, and water aerobics.    Step up your daily activity level.  Along with your exercise program, try being more active the whole day. Walk instead of drive. Or park further away so that you take more steps each day. Do more household tasks or yard work. You may not be able to meet the advised amount of physical activity. But doing some moderate- or vigorous-intensity aerobic activity can help reduce your risk for heart disease. Your healthcare provider can help you figure out what is best for you.    Choose 1 or more activities you enjoy.  Walking is one of the easiest things you can do. You can also try swimming, riding a bike, dancing, or taking an exercise class.    Call 911  Call 911 right away if any of these occur:     Chest pain that doesn't go away quickly with rest    New burning, tightness, pressure, or heaviness in your chest, neck, shoulders, back, or arms    Abnormal or severe shortness of breath    A very fast or irregular heartbeat (palpitations)    Fainting  When to call your healthcare  provider  Call your healthcare provider if you have any of these:     Dizziness or lightheadedness    Mild shortness of breath or chest pain    Increased or new joint or muscle pain    [employer] last reviewed this educational content on 7/1/2022 2000-2022 [employer]. All rights reserved. This information is not intended as a substitute for professional medical care. Always follow your healthcare professional's instructions.          Understanding USDA MyPlate  The USDA has guidelines to help you make healthy food choices. These are called MyPlate. MyPlate shows the food groups that make up healthy meals using the image of a place setting. Before you eat, think about the healthiest choices for what to put on your plate or in your cup or bowl. To learn more about building a healthy plate, visit www.choosemyplate.gov.     The food groups    Fruits. Any fruit or 100% fruit juice counts as part of the Fruit Group. Fruits may be fresh, canned, frozen, or dried, and may be whole, cut-up, or pureed. Make 1/2 of your plate fruits and vegetables.    Vegetables. Any vegetable or 100% vegetable juice counts as a member of the Vegetable Group. Vegetables may be fresh, frozen, canned, or dried. They can be served raw or cooked and may be whole, cut-up, or mashed. Make 1/2 of your plate fruits and vegetables.    Grains. All foods made from grains are part of the Grains Group. These include wheat, rice, oats, cornmeal, and barley. Grains are often used to make foods such as bread, pasta, oatmeal, cereal, tortillas, and grits. Grains should be no more than 1/4 of your plate. At least half of your grains should be whole grains.    Protein. This group includes meat, poultry, seafood, beans and peas, eggs, processed soy products (such as tofu), nuts (including nut butters), and seeds. Make protein choices no more than 1/4 of your plate. Meat and poultry choices should be lean or low fat.    Dairy. The Dairy Group  includes all fluid milk products and foods made from milk that contain calcium, such as yogurt and cheese. (Foods that have little calcium, such as cream, butter, and cream cheese, are not part of this group.) Most dairy choices should be low-fat or fat-free.    Oils. Oils aren't a food group, but they do contain essential nutrients. However it's important to watch your intake of oils. These are fats that are liquid at room temperature. They include canola, corn, olive, soybean, vegetable, and sunflower oil. Foods that are mainly oil include mayonnaise, certain salad dressings, and soft margarines. You likely already get your daily oil allowance from the foods you eat.  Things to limit  Eating healthy also means limiting these things in your diet:    Salt (sodium). Many processed foods have a lot of sodium. To keep sodium intake down, eat fresh vegetables, meats, poultry, and seafood when possible. Purchase low-sodium, reduced-sodium, or no-salt-added food products at the store. And don't add salt to your meals at home. Instead, season them with herbs and spices such as dill, oregano, cumin, and paprika. Or try adding flavor with lemon or lime zest and juice.    Saturated fat. Saturated fats are most often found in animal products such as beef, pork, and chicken. They are often solid at room temperature, such as butter. To reduce your saturated fat intake, choose leaner cuts of meat and poultry. And try healthier cooking methods such as grilling, broiling, roasting, or baking. For a simple lower-fat swap, use plain nonfat yogurt instead of mayonnaise when making potato salad or macaroni salad.    Added sugars. These are sugars added to foods. They are in foods such as ice cream, candy, soda, fruit drinks, sports drinks, energy drinks, cookies, pastries, jams, and syrups. Cut down on added sugars by sharing sweet treats with a family member or friend. You can also choose fruit for dessert, and drink water or other  unsweetened beverages.  [employer] last reviewed this educational content on 6/1/2020 2000-2022 [employer]. All rights reserved. This information is not intended as a substitute for professional medical care. Always follow your healthcare professional's instructions.          Urinary Incontinence, Female (Adult)   Urinary incontinence means loss of bladder control. This problem affects many women, especially as they get older. If you have incontinence, you may be embarrassed to ask for help. But know that this problem can be treated.   Types of Incontinence  There are different types of incontinence. Two of the main types are described here. You can have more than one type.     Stress incontinence. With this type, urine leaks when pressure (stress) is put on the bladder. This may happen when you cough, sneeze, or laugh. Stress incontinence most often occurs because the pelvic floor muscles that support the bladder and urethra are weak. This can happen after pregnancy and vaginal childbirth or a hysterectomy. It can also be due to excess body weight or hormone changes.    Urge incontinence (also called overactive bladder). With this type, a sudden urge to urinate is felt often. This may happen even though there may not be much urine in the bladder. The need to urinate often during the night is common. Urge incontinence most often occurs because of bladder spasms. This may be due to bladder irritation or infection. Damage to bladder nerves or pelvic muscles, constipation, and certain medicines can also lead to urge incontinence.  Treatment depends on the cause. Further evaluation is needed to find the type you have. This will likely include an exam and certain tests. Based on the results, you and your healthcare provider can then plan treatment. Until a diagnosis is made, the home care tips below can help ease symptoms.   Home care    Do pelvic floor muscle exercises, if they are prescribed. The pelvic  floor muscles help support the bladder and urethra. Many women find that their symptoms improve when doing special exercises that strengthen these muscles. To do the exercises, contract the muscles you would use to stop your stream of urine. But do this when you re not urinating. Hold for 10 seconds, then relax. Repeat 10 to 20 times in a row, at least 3 times a day. Your healthcare provider may give you other instructions for how to do the exercises and how often.    Keep a bladder diary. This helps track how often and how much you urinate over a set period of time. Bring this diary with you to your next visit with the provider. The information can help your provider learn more about your bladder problem.    Lose weight, if advised to by your provider. Extra weight puts pressure on the bladder. Your provider can help you create a weight-loss plan that s right for you. This may include exercising more and making certain diet changes.    Don't have foods and drinks that may irritate the bladder. These can include alcohol and caffeinated drinks.    Quit smoking. Smoking and other tobacco use can lead to a long-term (chronic) cough that strains the pelvic floor muscles. Smoking may also damage the bladder and urethra. Talk with your provider about treatments or methods you can use to quit smoking.    If drinking large amounts of fluid makes you have symptoms, you may be advised to limit your fluid intake. You may also be advised to drink most of your fluids during the day and to limit fluids at night.    If you re worried about urine leakage or accidents, you may wear absorbent pads to catch urine. Change the pads often. This helps reduce discomfort. It may also reduce the risk of skin or bladder infections.    Follow-up care  Follow up with your healthcare provider, or as directed. It may take some to find the right treatment for your problem. But healthy lifestyle changes can be made right away. These include such  things as exercising on a regular basis, eating a healthy diet, losing weight (if needed), and quitting smoking. Your treatment plan may include special therapies or medicines. Certain procedures or surgery may also be options. Talk about any questions you have with your provider.   When to seek medical advice  Call the healthcare provider right away if any of these occur:    Fever of 100.4 F (38 C) or higher, or as directed by your provider    Bladder pain or fullness    Belly swelling    Nausea or vomiting    Back pain    Weakness, dizziness, or fainting  [employer] last reviewed this educational content on 1/1/2020 2000-2022 [employer]. All rights reserved. This information is not intended as a substitute for professional medical care. Always follow your healthcare professional's instructions.

## 2023-04-24 NOTE — LETTER
My Heart Failure Action Plan  Name: Pebbles Powell   YOB: 1940  Date: 4/24/2023   My doctor:   Bren Tamez [address]  [place] [id number] [phone] My Diagnosis: HF-pEF (EF >40)  My Ejection Fraction: No results found for: LVEF  Over 50%  My Exercise Goal: Start exercise slowly - to begin, do a few minutes of exercise, several times a day. Increase your time and speed [id number] to build tolerance, with a goal of 30 minutes of exercise daily. Steady, slow, and consistent exercise is both safe and healthy. Stop and rest when you feel tired or become short of breath. Do not push yourself on days when you don t feel well.       My Weight Plan:   Wt Readings from Last 2 Encounters:   04/24/23 73.3 kg (161 lb 9.6 oz)   02/13/23 71.2 kg (157 lb)     Weigh yourself daily using the same scale. If you gain more than 2 pounds in 24 hours or 5 pounds in a week call the clinic    My Diet Goal: No added salt    Emergency Room Visits:    Our goal is to improve your quality of life and help you avoid a visit to the emergency room or hospital.  If we work together, we can achieve this goal. But, if you feel you need to call 911 or go to the emergency room, please do so.  If you go to the emergency room, please bring your list of medicines and your daily weight chart with you.       GREEN ZONE     Doing well today  Weight gained is no more than 2 pounds a day or 5 pounds a week.  No swelling in feet, ankles, legs or stomach.  No more swelling than usual.  No more trouble breathing than usual.  No change in my sleep.  No other problems. Actions:  I am doing fine. I will take my medicine, follow my diet, see my doctor, exercise, and watch for symptoms.           YELLOW ZONE         Having a bad day or flare up  Weight gain of more than 2 pounds in one day or 5 pounds in one week.  New swelling in ankle, leg, knee or thigh.  Bloating in belly,  pants feel tighter.  Swelling in hands or face.  Coughing or trouble breathing while walking or talking.  Harder to breathe last night.  Have trouble sleeping, wake up short of breath.  Much more tired than usual.  Not eating.  Nausea, vomiting, or diarrhea  Pain in my chest or bad  leg cramps.  Feel weak or dizzy. Actions:  I need to take action and call my doctor or nurse today.                 RED ZONE         Need medical care now  Weight gain of 5 pounds overnight.  Chest pain or pressure that does not go away.  Feel less alert.  Wheezing or have trouble breathing when at rest.  Cannot sleep lying down.  Cannot take my water pill.  Pass out or faint. Actions:  I need to call my doctor or nurse now!  Call 911 if I have chest pain or cannot breathe.

## 2023-04-25 ENCOUNTER — TELEPHONE (OUTPATIENT)
Dept: FAMILY MEDICINE | Facility: CLINIC | Age: 83
End: 2023-04-25
Payer: COMMERCIAL

## 2023-04-25 LAB
FERRITIN SERPL-MCNC: 164 NG/ML (ref 8–252)
IRON SATN MFR SERPL: 32 % (ref 15–46)
IRON SERPL-MCNC: 77 UG/DL (ref 35–180)
TIBC SERPL-MCNC: 244 UG/DL (ref 240–430)

## 2023-04-25 RX ORDER — LEVOTHYROXINE SODIUM 150 UG/1
150 TABLET ORAL DAILY
Qty: 90 TABLET | Refills: 3 | Status: SHIPPED | OUTPATIENT
Start: 2023-04-25 | End: 2023-07-28

## 2023-04-25 NOTE — TELEPHONE ENCOUNTER
Bren Tamez MD   4/25/2023 11:54 AM CDT       Please call patient:  Let's discuss your weight at follow-up. I recommend drinking more water, increasing fruits and veggies, and exercising as you're able.   Bren Aburto RN   4/25/2023 11:45 AM CDT       Patient notified of provider message as written. Patient verbalized good understanding.   She states that she would like to hold off on upper endoscopy at this time.      She is inquiring about weight loss medication, as she has recently gained a lot of weight and would like to discuss further. It appears as though this was not discussed at recent appt, 4/24/23. Discuss at follow-up in 1 month?     Sahra FLORES, ERIC  [facility]    Bren Tamez MD   4/25/2023 11:19 AM CDT       Please call patient:  You have chronic anemia. I'd like you to take a medication called omeprazole for one month to see if this might help your symptoms. Increase your thyroid dose too. Follow-up in 1 month for recheck of these symptoms and tests. Let's consider an upper endoscopy test.      Bren Tamez MD

## 2023-04-25 NOTE — RESULT ENCOUNTER NOTE
Please call patient:  Let's discuss your weight at follow-up. I recommend drinking more water, increasing fruits and veggies, and exercising as you're able.   Bren Tamez MD

## 2023-04-25 NOTE — RESULT ENCOUNTER NOTE
Please call patient:  You have chronic anemia. I'd like you to take a medication called omeprazole for one month to see if this might help your symptoms. Increase your thyroid dose too. Follow-up in 1 month for recheck of these symptoms and tests. Let's consider an upper endoscopy test.     Bren Tamez MD

## 2023-04-25 NOTE — TELEPHONE ENCOUNTER
Attempted call to pt. Voicemail box is full. Unable to leave a message.    Lolita Dumont RN  [facility]

## 2023-04-26 NOTE — TELEPHONE ENCOUNTER
Attempted to call patient but no answer.  VM box full. Unable to leave VM  [employer] message sent    Ty Rodriguez RN  [facility]

## 2023-04-27 NOTE — TELEPHONE ENCOUNTER
3rd attempt to call patient but no answer.  VM box continues to be full. Unable to leave VM  A [employer] message was already sent to patient yesterday.    Closing encounter as we have made multiple attempts to reach patient without success    Ty Rodriguez RN  [facility]

## 2023-07-27 ENCOUNTER — OFFICE VISIT (OUTPATIENT)
Dept: FAMILY MEDICINE | Facility: CLINIC | Age: 83
End: 2023-07-27
Payer: COMMERCIAL

## 2023-07-27 VITALS
RESPIRATION RATE: 20 BRPM | OXYGEN SATURATION: 99 % | DIASTOLIC BLOOD PRESSURE: 67 MMHG | HEART RATE: 71 BPM | WEIGHT: 159.2 LBS | SYSTOLIC BLOOD PRESSURE: 119 MMHG | TEMPERATURE: 97.9 F | BODY MASS INDEX: 23.58 KG/M2 | HEIGHT: 69 IN

## 2023-07-27 DIAGNOSIS — D63.1 ANEMIA SECONDARY TO RENAL FAILURE: ICD-10-CM

## 2023-07-27 DIAGNOSIS — G89.29 CHRONIC PAIN OF LEFT KNEE: ICD-10-CM

## 2023-07-27 DIAGNOSIS — R19.5 DARK STOOLS: Primary | ICD-10-CM

## 2023-07-27 DIAGNOSIS — N18.9 ANEMIA SECONDARY TO RENAL FAILURE: ICD-10-CM

## 2023-07-27 DIAGNOSIS — M25.562 CHRONIC PAIN OF LEFT KNEE: ICD-10-CM

## 2023-07-27 DIAGNOSIS — E03.9 ACQUIRED HYPOTHYROIDISM: ICD-10-CM

## 2023-07-27 LAB
HGB BLD-MCNC: 9.3 G/DL (ref 11.7–15.7)
T4 FREE SERPL-MCNC: 0.49 NG/DL (ref 0.9–1.7)
TSH SERPL DL<=0.005 MIU/L-ACNC: 85.4 UIU/ML (ref 0.3–4.2)

## 2023-07-27 PROCEDURE — 84439 ASSAY OF FREE THYROXINE: CPT | Performed by: NURSE PRACTITIONER

## 2023-07-27 PROCEDURE — 84443 ASSAY THYROID STIM HORMONE: CPT | Performed by: NURSE PRACTITIONER

## 2023-07-27 PROCEDURE — 85018 HEMOGLOBIN: CPT | Performed by: NURSE PRACTITIONER

## 2023-07-27 PROCEDURE — 36415 COLL VENOUS BLD VENIPUNCTURE: CPT | Performed by: NURSE PRACTITIONER

## 2023-07-27 PROCEDURE — 99214 OFFICE O/P EST MOD 30 MIN: CPT | Performed by: NURSE PRACTITIONER

## 2023-07-27 ASSESSMENT — PATIENT HEALTH QUESTIONNAIRE - PHQ9: SUM OF ALL RESPONSES TO PHQ QUESTIONS 1-9: 0

## 2023-07-27 ASSESSMENT — PAIN SCALES - GENERAL: PAINLEVEL: NO PAIN (0)

## 2023-07-27 NOTE — PATIENT INSTRUCTIONS
Try eucerin or aquaphor for your dry skin     Please increase your high iron foods     Please try metamucil for your constipation or flax seed

## 2023-07-27 NOTE — PROGRESS NOTES
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  Comment: HBG today 9.3, slight improvement  Plan: strongly recommend high iron foods if she doesn't want supplementation, list given, follow up with PCP           Patient Instructions   Try eucerin or aquaphor for your dry skin     Please increase your high iron foods     Please try metamucil for your constipation or flax seed     Gail Eaton, DAVI, APRN CNP  [facility] [place] is a 82 year old, presenting for the following health issues:  Black Stool        7/27/2023     9:27 AM   Additional Questions   Roomed by Maia         7/27/2023     9:27 AM   Patient Reported Additional Medications   Patient reports taking the following new medications None       HPI     Black stools  Onset/Duration: off and on but nothing for 2 weeks ago,worried it might be blood,has been taking Ferrous sulfate 325mg off and on for chronic anemia, not interested in taking iron anymore or stool softener   Description:  Frequency of bowel movements: 2-4 times per week  Consistency of stool: form soft   Progression of Symptoms: intermittent  Accompanying signs and symptoms:    Abdominal pain: No   Rectal pain: No   Blood in stool: YES- possible? Stools have been black    Nausea/Vomiting: No   Weight loss or gain: No  History:   Similar problems in past: No  History of abdominal surgery:  "YES- Appendix  Chronic laxative use: No, had stool softener but refuses to use, oc constipation   New medications: No  Precipitating or alleviating factors: None  Therapies tried and outcome: None      Concern - feels left knee needs to be replaced, already had her right one done   Onset: increase in pain for about one month  Description: mobility ok but struggling to kneel and pain with activity   Intensity: mild  Progression of Symptoms:  worsening  Accompanying Signs & Symptoms: none  Previous history of similar problem: had right one replaced   Precipitating factors:        Worsened by: bending   Alleviating factors:        Improved by: tylenol   Therapies tried and outcome: requesting to see ortho but has a reference from a friend and she will call Dr. Ernesto Lopez [facility]       Review of Systems   Constitutional, HEENT, cardiovascular, pulmonary, gi and gu systems are negative, except as otherwise noted.      Objective    /67 (BP Location: Right arm, Patient Position: Sitting, Cuff Size: Adult Regular)   Pulse 71   Temp 97.9  F (36.6  C) (Oral)   Resp 20   Ht 1.74 m (5' 8.5\")   Wt 72.2 kg (159 lb 3.2 oz)   SpO2 99%   BMI 23.85 kg/m    Body mass index is 23.85 kg/m .  Physical Exam   GENERAL: healthy, alert and no distress  RESP: lungs clear to auscultation - no rales, rhonchi or wheezes  CV: regular rate and rhythm, normal S1 S2, no S3 or S4, no murmur, click or rub, no peripheral edema and peripheral pulses strong  MS: no gross musculoskeletal defects noted, no edema,   SKIN: no suspicious lesions or rashes, varicose veins on left knee, dry skin and bruising on hands and forearms   PSYCH: mentation appears normal, affect normal/bright                      "

## 2023-07-28 DIAGNOSIS — E03.9 ACQUIRED HYPOTHYROIDISM: ICD-10-CM

## 2023-07-28 RX ORDER — LEVOTHYROXINE SODIUM 175 UG/1
150 TABLET ORAL DAILY
Qty: 90 TABLET | Refills: 3 | Status: SHIPPED | OUTPATIENT
Start: 2023-07-28

## 2023-07-29 NOTE — RESULT ENCOUNTER NOTE
Please call patient:  Stable chronic anemia. If you are taking your thyroid medication daily, your thyroid dose needs to be increased. A new prescription has been sent to your pharmacy. Take your medication on an empty stomach. Return for recheck of these labs in 6 weeks. Call [phone] or go to [email or web address] for a lab-only appointment.    Bren Tamez MD

## 2023-08-01 ENCOUNTER — TELEPHONE (OUTPATIENT)
Dept: FAMILY MEDICINE | Facility: CLINIC | Age: 83
End: 2023-08-01
Payer: COMMERCIAL

## 2023-08-01 NOTE — TELEPHONE ENCOUNTER
"Left message on voicemail advising patient to return call at [phone].    \"Stable chronic anemia. If you are taking your thyroid medication daily, your thyroid dose needs to be increased. A new prescription has been sent to your pharmacy. Take your medication on an empty stomach. Return for recheck of these labs in 6 weeks. Call [phone] or go to [email or web address] for a lab-only appointment.    Bren Tamez MD\"    Sahra FLORES, RN  [facility]    "

## 2023-08-03 NOTE — TELEPHONE ENCOUNTER
2nd attempt. Called patient. Left voice message to return call at [phone].    Result message was sent to patient through [employer] and has not been read by patient.    Angela Hayward RN   [facility]

## 2023-08-29 NOTE — TELEPHONE ENCOUNTER
Left message for patient to call back.  Savana ARREAGA CMA ([facility])  
Patient notified. She states she will come in and leave a sample.  Gabrielle Gilmore, [facility]    
Please call and notify patient that her white blood cell count is slightly elevated so I would recommend she bring in a urine sample today to make sure we rule out a urinary tract infection. If negative, I recommend repeating her white blood cell count in 1 week. Her anemia has very slightly worsened but not to the point of needing another injection.    Dominic Quiles PA-C    
show

## 2023-08-30 ENCOUNTER — ALLIED HEALTH/NURSE VISIT (OUTPATIENT)
Dept: FAMILY MEDICINE | Facility: CLINIC | Age: 83
End: 2023-08-30
Payer: COMMERCIAL

## 2023-08-30 DIAGNOSIS — S41.119A ARM LACERATION: Primary | ICD-10-CM

## 2023-08-30 PROCEDURE — 99207 PR NO CHARGE NURSE ONLY: CPT

## 2023-08-30 NOTE — PATIENT INSTRUCTIONS
We have your appointment scheduled for tomorrow (8/31) at 11:40 am. Please call [phone] for any further questions.

## 2023-08-30 NOTE — PROGRESS NOTES
Patient walked-in to clinic requesting laceration on right arm to be assessed.    Noticed wound today. Patient unsure of how she had the wound.     Wound was approximately 1 inch long. Bleeding had stopped. Patient denies being on any blood thinners at this time. No signs of infection.    RN cleaned wound with sterile water and put on bacitracin. Dressed wound with bandage.    Advised patient to follow-up if pt develops any signs of infection such as redness, fever, or green/yellow drainage. Patient verbalized understanding.    Patient expresses that she is also concerned regarding unexplained bruises she has noticed for the past few weeks.    Patient has bruises on her arms and legs. Patient denies knowing of any injuries lately and states they randomly appear.    Not caused by an injury and < 5 unexplained bruises  R/O: bleeding disorder    Yes See in Office Within 3 Days     Assisted with booking appointment tomorrow as requested at 12:00 pm.    MARK Pascual RN  [facility]

## 2024-05-06 ENCOUNTER — TELEPHONE (OUTPATIENT)
Dept: FAMILY MEDICINE | Facility: CLINIC | Age: 84
End: 2024-05-06
Payer: COMMERCIAL

## 2024-05-06 NOTE — LETTER
May 6, 2024      Pebbles JEFF Powell  [address]    [place] [address]      Your team at [facility] cares about your health. We have reviewed your chart and based on our findings; we are making the following recommendations to better manage your health.     You are in particular need of attention regarding the following:     Schedule an office visit with your PRIMARY CARE PHYSICIAN for mental health follow-up.  PREVENTATIVE VISIT: Annual Medicare Wellness:Schedule an Annual Medicare Wellness Exam. Please [facility] to set up your appointment.    If you have already completed these items, please contact the clinic via phone or   [facility] so your care team can review and update your records. Thank you for   choosing [facility] for your healthcare needs. For any questions,   concerns, or to schedule an appointment please contact our clinic.    Healthy Regards,      Your [facility] Care Team

## 2024-05-06 NOTE — TELEPHONE ENCOUNTER
Patient Quality Outreach    Patient is due for the following:   Depression  -  PHQ-9 needed  Physical Annual Wellness Visit      Topic Date Due    Zoster (Shingles) Vaccine (2 of 3) 03/19/2008    COVID-19 Vaccine (6 - 2023-24 season) 09/01/2023       Next Steps:   Schedule a Annual Wellness Visit    Type of outreach:    Sent letter.    Next Steps:  Reach out within 90 days via Letter.    Max number of attempts reached: Yes. Will try again in 90 days if patient still on fail list.    Questions for provider review:    None           Claudia Jordan CMA  Chart routed to Care Team.

## 2024-08-08 ENCOUNTER — PATIENT OUTREACH (OUTPATIENT)
Dept: CARE COORDINATION | Facility: CLINIC | Age: 84
End: 2024-08-08
Payer: COMMERCIAL

## 2024-08-08 NOTE — PROGRESS NOTES
Clinical Product Navigator RN reviewed chart; patient on payer product coverage.  Review results:   CPN Initial Information Gathering  Referral Source: Health Plan    Patient identified by health plan for potential care management.  Patient's care is received through [facility] for primary care and specialty care and is therefore, not eligible for [facility] primary care - care coordination.    Melissa Behl BSN, RN, PHN, CCM  RN Clinical Product Navigator  [phone]

## 2024-10-16 NOTE — MR AVS SNAPSHOT
After Visit Summary   8/21/2017    Pebbles Powell    MRN: [id number]           Patient Information     Date Of Birth          1940        Visit Information        Provider Department      8/21/2017 10:30 AM NL RN TEAM A, [facility]        Today's Diagnoses     Change or removal of wound packing    -  1       Follow-ups after your visit        Your next 10 appointments already scheduled     Aug 24, 2017  8:30 AM CDT   Nurse Only with NL RN TEAM A, [place]   [facility]    [address]  [place] [id number] [phone]              Who to contact     If you have questions or need follow up information about today's clinic visit or your schedule please contact [facility] directly at [phone].  Normal or non-critical lab and imaging results will be communicated to you by [email or web address], letter or phone within 4 business days after the clinic has received the results. If you do not hear from us within 7 days, please contact the clinic through [email or web address] or phone. If you have a critical or abnormal lab result, we will notify you by phone as soon as possible.  Submit refill requests through [facility] or call your pharmacy and they will forward the refill request to us. Please allow 3 business days for your refill to be completed.          Additional Information About Your Visit        MyChart Information     [facility] gives you secure access to your electronic health record. If you see a primary care provider, you can also send messages to your care team and make appointments. If you have questions, please call your primary care clinic.  If you do not have a primary care provider, please call [phone] and they will assist you.        Care EveryWhere ID     This is your Care EveryWhere ID. This could be used by other organizations to access your [place] medical records  [id number]         Blood Pressure from Last 3  ASSESSMENT & PLAN   Diagnosis Orders   1. Attention deficit hyperactivity disorder (ADHD), combined type  amphetamine-dextroamphetamine (ADDERALL, 15MG,) 15 MG tablet          OAARS reviewed and appropriate.     Controlled Substances Monitoring: Periodic Controlled Substance Monitoring: Possible medication side effects, risk of tolerance/dependence & alternative treatments discussed., No signs of potential drug abuse or diversion identified. (Shahram Flores, DO)   Encounters:   08/08/17 116/62   10/05/16 120/60   04/18/16 121/57    Weight from Last 3 Encounters:   08/08/17 175 lb 9.6 oz (79.7 kg)   03/15/17 187 lb (84.8 kg)   10/05/16 187 lb (84.8 kg)              Today, you had the following     No orders found for display       Primary Care Provider Office Phone # Fax #    Janiya Yady Fraser [phone]       [address] [place] [address]        Equal Access to Services     [facility]: Hadii janet ku hadasho Soomaali, waaxda luqadaha, qaybta kaalmada adeaziza, corey claudio . So [facility] [phone].    ATENCIÓN: Si habla español, tiene a caal disposición servicios gratuitos de asistencia lingüística. [facility] [phone].    We comply with applicable federal civil rights laws and [place] laws. We do not discriminate on the basis of race, color, national origin, age, disability sex, sexual orientation or gender identity.            Thank you!     Thank you for choosing [facility]  for your care. Our goal is always to provide you with excellent care. Hearing back from our patients is one way we can continue to improve our services. Please take a few minutes to complete the written survey that you may receive in the mail after your visit with us. Thank you!             Your Updated Medication List - Protect others around you: Learn how to safely use, store and throw away your medicines at www.disposemymeds.org.          This list is accurate as of: 8/21/17 10:48 AM.  Always use your most recent med list.                   Brand Name Dispense Instructions for use Diagnosis    acetaminophen 650 MG 8 hour tablet     100 tablet    Take 650 mg by mouth every 6 hours    Status post total right knee replacement       AUGMENTIN PO           doxycycline 100 MG tablet    VIBRA-TABS    20 tablet    Take 1 tablet (100 mg) by mouth 2 times daily    Cat bite of right foot, subsequent encounter       FERROUS GLUCONATE PO       Take 324 mg by mouth every other day        furosemide 20 MG tablet    LASIX    90 tablet    TAKE ONE TABLET BY MOUTH ONCE DAILY IN THE MORNING    Chronic diastolic congestive heart failure (H)       levothyroxine 100 MCG tablet    SYNTHROID/LEVOTHROID    90 tablet    TAKE ONE TABLET BY MOUTH ONCE DAILY    Hypothyroidism       Magnesium 500 MG Caps           multivitamin per tablet      1 TABLET DAILY brand varies per pt        order for DME     1 Device    Equipment being ordered: compression stockings, med strength    Unspecified venous (peripheral) insufficiency       VITAMIN B 12 PO      Take 2,500 mcg by mouth

## 2024-11-16 ENCOUNTER — HEALTH MAINTENANCE LETTER (OUTPATIENT)
Age: 84
End: 2024-11-16
